# Patient Record
Sex: MALE | Race: WHITE | Employment: OTHER | URBAN - METROPOLITAN AREA
[De-identification: names, ages, dates, MRNs, and addresses within clinical notes are randomized per-mention and may not be internally consistent; named-entity substitution may affect disease eponyms.]

---

## 2019-06-26 ENCOUNTER — HOSPITAL ENCOUNTER (OUTPATIENT)
Dept: LAB | Age: 69
Discharge: HOME | End: 2019-06-26

## 2019-06-26 PROCEDURE — 88305 TISSUE EXAM BY PATHOLOGIST: CPT

## 2019-07-04 PROBLEM — C18.2 MALIGNANT NEOPLASM OF ASCENDING COLON (HCC): Status: ACTIVE | Noted: 2019-07-04

## 2019-07-12 RX ORDER — SODIUM CHLORIDE 0.9 % (FLUSH) 0.9 %
5-40 SYRINGE (ML) INJECTION AS NEEDED
Status: CANCELLED | OUTPATIENT
Start: 2019-07-12

## 2019-07-12 RX ORDER — SODIUM CHLORIDE 0.9 % (FLUSH) 0.9 %
5-40 SYRINGE (ML) INJECTION EVERY 8 HOURS
Status: CANCELLED | OUTPATIENT
Start: 2019-07-12

## 2019-07-24 ENCOUNTER — HOSPITAL ENCOUNTER (OUTPATIENT)
Dept: SURGERY | Age: 69
Discharge: HOME | End: 2019-07-24
Payer: MEDICARE

## 2019-07-24 VITALS
SYSTOLIC BLOOD PRESSURE: 131 MMHG | WEIGHT: 200.56 LBS | OXYGEN SATURATION: 97 % | BODY MASS INDEX: 31.48 KG/M2 | DIASTOLIC BLOOD PRESSURE: 72 MMHG | TEMPERATURE: 97 F | HEIGHT: 67 IN | RESPIRATION RATE: 16 BRPM | HEART RATE: 71 BPM

## 2019-07-24 RX ORDER — LANOLIN ALCOHOL/MO/W.PET/CERES
2500 CREAM (GRAM) TOPICAL DAILY
COMMUNITY

## 2019-07-24 RX ORDER — ASCORBIC ACID 500 MG
1000 TABLET ORAL DAILY
COMMUNITY
End: 2020-07-06

## 2019-07-24 RX ORDER — BISMUTH SUBSALICYLATE 262 MG
1 TABLET,CHEWABLE ORAL DAILY
COMMUNITY
End: 2020-07-06

## 2019-07-24 RX ORDER — ACETAMINOPHEN 325 MG/1
TABLET ORAL
COMMUNITY
End: 2019-08-02

## 2019-07-24 RX ORDER — DICLOFENAC SODIUM 10 MG/G
GEL TOPICAL 4 TIMES DAILY
COMMUNITY
End: 2020-07-06

## 2019-07-24 NOTE — PERIOP NOTES
Dr. Christian Harper called to discuss posted surgery. No need for patient to be seen today per Dr. Christian Harper.

## 2019-07-24 NOTE — PERIOP NOTES
Patient confirms name and . Order to obtain consent found in EHR and  matches case posting. Type 3 surgery,  assessment complete. Labs per surgeon: cbc with diff, cmp 19, type and screen signed and held Dewey. Labs per anesthesia protocol: none  EKG: not required      Medication list  Rx bottles visualized today. Hibiclens and instructions given per hospital policy. Patient provided with and instructed on educational handouts including Guide to Surgery, Pain Management, Hand Hygiene, Blood Transfusion Education, and [facility] Brochure. Patient answered medical/surgical history questions at their best of ability. All prior to admission medications documented in [facility]. Patient instructed to continue previous medications as prescribed prior to surgery and to take the following medications the day of surgery according to anesthesia guidelines with a small sip of water: omeprazole, sertraline, tylenol if needed. Patient instructed to hold all vitamins 7 days prior to surgery and NSAIDS 5 days prior to surgery, patient verbalized understanding. Medications to be held: diclofenac gel, asa 81mg. Patient denies MI, stents, stroke, blood clots. Patient instructed on the following:  Arrive at [facility], time of arrival to be called the day before by 1700  NPO after midnight including gum, mints, and ice chips. Responsible adult must drive patient to the hospital, stay during surgery, and patient will require supervision 24 hours after anesthesia. Use hibiclens in shower the night before surgery and on the morning of surgery. Leave all valuables (money and jewelry) at home but bring insurance card and ID on       DOS. Do not wear make-up, nail polish, lotions, cologne, perfumes, powders, or oil on skin. Patient teach back successful and patient demonstrates knowledge of instruction.

## 2019-07-25 ENCOUNTER — HOSPITAL ENCOUNTER (OUTPATIENT)
Dept: LAB | Age: 69
Discharge: HOME | End: 2019-07-25
Payer: MEDICARE

## 2019-07-25 LAB
ALBUMIN SERPL-MCNC: 3.4 G/DL (ref 3.2–4.6)
ALBUMIN/GLOB SERPL: 1.1 {RATIO} (ref 1.2–3.5)
ALP SERPL-CCNC: 71 U/L (ref 50–136)
ALT SERPL-CCNC: 23 U/L (ref 12–65)
ANION GAP SERPL CALC-SCNC: 7 MMOL/L (ref 7–16)
AST SERPL-CCNC: 27 U/L (ref 15–37)
BASOPHILS # BLD: 0 K/UL (ref 0–0.2)
BASOPHILS NFR BLD: 0 % (ref 0–2)
BILIRUB SERPL-MCNC: 0.2 MG/DL (ref 0.2–1.1)
BUN SERPL-MCNC: 15 MG/DL (ref 8–23)
CALCIUM SERPL-MCNC: 8.3 MG/DL (ref 8.3–10.4)
CHLORIDE SERPL-SCNC: 108 MMOL/L (ref 98–107)
CO2 SERPL-SCNC: 28 MMOL/L (ref 21–32)
CREAT SERPL-MCNC: 0.92 MG/DL (ref 0.8–1.5)
DIFFERENTIAL METHOD BLD: ABNORMAL
EOSINOPHIL # BLD: 0.1 K/UL (ref 0–0.8)
EOSINOPHIL NFR BLD: 1 % (ref 0.5–7.8)
ERYTHROCYTE [DISTWIDTH] IN BLOOD BY AUTOMATED COUNT: 17.4 % (ref 11.9–14.6)
GLOBULIN SER CALC-MCNC: 3.1 G/DL (ref 2.3–3.5)
GLUCOSE SERPL-MCNC: 148 MG/DL (ref 65–100)
HCT VFR BLD AUTO: 28.8 % (ref 41.1–50.3)
HGB BLD-MCNC: 8.1 G/DL (ref 13.6–17.2)
IMM GRANULOCYTES # BLD AUTO: 0 K/UL (ref 0–0.5)
IMM GRANULOCYTES NFR BLD AUTO: 0 % (ref 0–5)
LYMPHOCYTES # BLD: 1.2 K/UL (ref 0.5–4.6)
LYMPHOCYTES NFR BLD: 22 % (ref 13–44)
MCH RBC QN AUTO: 19.4 PG (ref 26.1–32.9)
MCHC RBC AUTO-ENTMCNC: 28.1 G/DL (ref 31.4–35)
MCV RBC AUTO: 68.9 FL (ref 79.6–97.8)
MONOCYTES # BLD: 0.4 K/UL (ref 0.1–1.3)
MONOCYTES NFR BLD: 8 % (ref 4–12)
NEUTS SEG # BLD: 3.6 K/UL (ref 1.7–8.2)
NEUTS SEG NFR BLD: 68 % (ref 43–78)
NRBC # BLD: 0 K/UL (ref 0–0.2)
PLATELET # BLD AUTO: 175 K/UL (ref 150–450)
PMV BLD AUTO: 9.4 FL (ref 9.4–12.3)
POTASSIUM SERPL-SCNC: 4.2 MMOL/L (ref 3.5–5.1)
PROT SERPL-MCNC: 6.5 G/DL (ref 6.3–8.2)
RBC # BLD AUTO: 4.18 M/UL (ref 4.23–5.6)
SODIUM SERPL-SCNC: 143 MMOL/L (ref 136–145)
WBC # BLD AUTO: 5.3 K/UL (ref 4.3–11.1)

## 2019-07-25 PROCEDURE — 36415 COLL VENOUS BLD VENIPUNCTURE: CPT

## 2019-07-25 PROCEDURE — 80053 COMPREHEN METABOLIC PANEL: CPT

## 2019-07-25 PROCEDURE — 85025 COMPLETE CBC W/AUTO DIFF WBC: CPT

## 2019-07-25 NOTE — PERIOP NOTES
Recent Results (from the past 12 hour(s))   CBC WITH AUTOMATED DIFF    Collection Time: 07/25/19  8:51 AM   Result Value Ref Range    WBC 5.3 4.3 - 11.1 K/uL    RBC 4.18 (L) 4.23 - 5.6 M/uL    HGB 8.1 (L) 13.6 - 17.2 g/dL    HCT 28.8 (L) 41.1 - 50.3 %    MCV 68.9 (L) 79.6 - 97.8 FL    MCH 19.4 (L) 26.1 - 32.9 PG    MCHC 28.1 (L) 31.4 - 35.0 g/dL    RDW 17.4 (H) 11.9 - 14.6 %    PLATELET 201 581 - 106 K/uL    MPV 9.4 9.4 - 12.3 FL    ABSOLUTE NRBC 0.00 0.0 - 0.2 K/uL    DF AUTOMATED      NEUTROPHILS 68 43 - 78 %    LYMPHOCYTES 22 13 - 44 %    MONOCYTES 8 4.0 - 12.0 %    EOSINOPHILS 1 0.5 - 7.8 %    BASOPHILS 0 0.0 - 2.0 %    IMMATURE GRANULOCYTES 0 0.0 - 5.0 %    ABS. NEUTROPHILS 3.6 1.7 - 8.2 K/UL    ABS. LYMPHOCYTES 1.2 0.5 - 4.6 K/UL    ABS. MONOCYTES 0.4 0.1 - 1.3 K/UL    ABS. EOSINOPHILS 0.1 0.0 - 0.8 K/UL    ABS. BASOPHILS 0.0 0.0 - 0.2 K/UL    ABS. IMM. GRANS. 0.0 0.0 - 0.5 K/UL   METABOLIC PANEL, COMPREHENSIVE    Collection Time: 07/25/19  8:51 AM   Result Value Ref Range    Sodium 143 136 - 145 mmol/L    Potassium 4.2 3.5 - 5.1 mmol/L    Chloride 108 (H) 98 - 107 mmol/L    CO2 28 21 - 32 mmol/L    Anion gap 7 7 - 16 mmol/L    Glucose 148 (H) 65 - 100 mg/dL    BUN 15 8 - 23 MG/DL    Creatinine 0.92 0.8 - 1.5 MG/DL    GFR est AA >60 >60 ml/min/1.73m2    GFR est non-AA >60 >60 ml/min/1.73m2    Calcium 8.3 8.3 - 10.4 MG/DL    Bilirubin, total 0.2 0.2 - 1.1 MG/DL    ALT (SGPT) 23 12 - 65 U/L    AST (SGOT) 27 15 - 37 U/L    Alk. phosphatase 71 50 - 136 U/L    Protein, total 6.5 6.3 - 8.2 g/dL    Albumin 3.4 3.2 - 4.6 g/dL    Globulin 3.1 2.3 - 3.5 g/dL    A-G Ratio 1.1 (L) 1.2 - 3.5     Reviewd  Dr Julita Pritchard notified of Hgb  Segundo at  [facility] office notified.

## 2019-07-30 ENCOUNTER — ANESTHESIA EVENT (OUTPATIENT)
Dept: SURGERY | Age: 69
DRG: 331 | End: 2019-07-30
Payer: MEDICARE

## 2019-07-31 ENCOUNTER — HOSPITAL ENCOUNTER (INPATIENT)
Age: 69
LOS: 2 days | Discharge: HOME | DRG: 331 | End: 2019-08-02
Attending: SURGERY | Admitting: SURGERY
Payer: MEDICARE

## 2019-07-31 ENCOUNTER — ANESTHESIA (OUTPATIENT)
Dept: SURGERY | Age: 69
DRG: 331 | End: 2019-07-31
Payer: MEDICARE

## 2019-07-31 DIAGNOSIS — C18.2 MALIGNANT NEOPLASM OF ASCENDING COLON (HCC): Primary | ICD-10-CM

## 2019-07-31 PROBLEM — C18.9 COLON CANCER (HCC): Status: ACTIVE | Noted: 2019-07-31

## 2019-07-31 LAB
ABO + RH BLD: NORMAL
BLOOD GROUP ANTIBODIES SERPL: NORMAL
SPECIMEN EXP DATE BLD: NORMAL

## 2019-07-31 PROCEDURE — 76010000877 HC OR TIME 2.5 TO 3HR INTENSV - TIER 2: Performed by: SURGERY

## 2019-07-31 PROCEDURE — 77030035048 HC TRCR ENDOSC OPTCL COVD -B: Performed by: SURGERY

## 2019-07-31 PROCEDURE — 77030037088 HC TUBE ENDOTRACH ORAL NSL COVD-A: Performed by: ANESTHESIOLOGY

## 2019-07-31 PROCEDURE — 76060000036 HC ANESTHESIA 2.5 TO 3 HR: Performed by: SURGERY

## 2019-07-31 PROCEDURE — 74011250636 HC RX REV CODE- 250/636: Performed by: SURGERY

## 2019-07-31 PROCEDURE — 77030039283 HC SHR HARM INSRT DISP DAVNC INTU -F: Performed by: SURGERY

## 2019-07-31 PROCEDURE — 77030031139 HC SUT VCRL2 J&J -A: Performed by: SURGERY

## 2019-07-31 PROCEDURE — 74011000250 HC RX REV CODE- 250: Performed by: SURGERY

## 2019-07-31 PROCEDURE — 77030002996 HC SUT SLK J&J -A: Performed by: SURGERY

## 2019-07-31 PROCEDURE — 76210000006 HC OR PH I REC 0.5 TO 1 HR: Performed by: SURGERY

## 2019-07-31 PROCEDURE — 74011250636 HC RX REV CODE- 250/636

## 2019-07-31 PROCEDURE — 74011250637 HC RX REV CODE- 250/637: Performed by: ANESTHESIOLOGY

## 2019-07-31 PROCEDURE — 88361 TUMOR IMMUNOHISTOCHEM/COMPUT: CPT

## 2019-07-31 PROCEDURE — 74011000254 HC RX REV CODE- 254

## 2019-07-31 PROCEDURE — 0DTF4ZZ RESECTION OF RIGHT LARGE INTESTINE, PERCUTANEOUS ENDOSCOPIC APPROACH: ICD-10-PCS | Performed by: SURGERY

## 2019-07-31 PROCEDURE — 74011000250 HC RX REV CODE- 250

## 2019-07-31 PROCEDURE — 77030009850 HC PCH ENDOSC SPEC COOK -B: Performed by: SURGERY

## 2019-07-31 PROCEDURE — 88307 TISSUE EXAM BY PATHOLOGIST: CPT

## 2019-07-31 PROCEDURE — 77030027138 HC INCENT SPIROMETER -A

## 2019-07-31 PROCEDURE — 8E0W4CZ ROBOTIC ASSISTED PROCEDURE OF TRUNK REGION, PERCUTANEOUS ENDOSCOPIC APPROACH: ICD-10-PCS | Performed by: SURGERY

## 2019-07-31 PROCEDURE — 77030019940 HC BLNKT HYPOTHRM STRY -B: Performed by: ANESTHESIOLOGY

## 2019-07-31 PROCEDURE — 77030035278 HC STPLR SEAL ENDOWR INTU -B: Performed by: SURGERY

## 2019-07-31 PROCEDURE — 77030032490 HC SLV COMPR SCD KNE COVD -B: Performed by: SURGERY

## 2019-07-31 PROCEDURE — 86900 BLOOD TYPING SEROLOGIC ABO: CPT

## 2019-07-31 PROCEDURE — 77030035489 HC REDUCR CANN ENDOWR INTU -C: Performed by: SURGERY

## 2019-07-31 PROCEDURE — 77030018842 HC SOL IRR SOD CL 9% BAXT -A: Performed by: SURGERY

## 2019-07-31 PROCEDURE — 77030008771 HC TU NG SALEM SUMP -A: Performed by: ANESTHESIOLOGY

## 2019-07-31 PROCEDURE — 77030008522 HC TBNG INSUF LAPRO STRY -B: Performed by: SURGERY

## 2019-07-31 PROCEDURE — 74011250636 HC RX REV CODE- 250/636: Performed by: ANESTHESIOLOGY

## 2019-07-31 PROCEDURE — 77030016151 HC PROTCTR LNS DFOG COVD -B: Performed by: SURGERY

## 2019-07-31 PROCEDURE — 65270000029 HC RM PRIVATE

## 2019-07-31 PROCEDURE — 77030020703 HC SEAL CANN DISP INTU -B: Performed by: SURGERY

## 2019-07-31 PROCEDURE — 77030018836 HC SOL IRR NACL ICUM -A: Performed by: SURGERY

## 2019-07-31 PROCEDURE — 77030035277 HC OBTRTR BLDELSS DISP INTU -B: Performed by: SURGERY

## 2019-07-31 PROCEDURE — 77030032523 HC RELD STPL PK ENDORS INTU -C: Performed by: SURGERY

## 2019-07-31 PROCEDURE — 77030008756 HC TU IRR SUC STRY -B: Performed by: SURGERY

## 2019-07-31 PROCEDURE — 77030039425 HC BLD LARYNG TRULITE DISP TELE -A: Performed by: ANESTHESIOLOGY

## 2019-07-31 PROCEDURE — 77030021158 HC TRCR BLN GELPRT AMR -B: Performed by: SURGERY

## 2019-07-31 PROCEDURE — 77030019908 HC STETH ESOPH SIMS -A: Performed by: ANESTHESIOLOGY

## 2019-07-31 RX ORDER — MIDAZOLAM HYDROCHLORIDE 1 MG/ML
2 INJECTION, SOLUTION INTRAMUSCULAR; INTRAVENOUS
Status: ACTIVE | OUTPATIENT
Start: 2019-07-31 | End: 2019-08-01

## 2019-07-31 RX ORDER — METRONIDAZOLE 500 MG/100ML
500 INJECTION, SOLUTION INTRAVENOUS ONCE
Status: COMPLETED | OUTPATIENT
Start: 2019-07-31 | End: 2019-07-31

## 2019-07-31 RX ORDER — NEOSTIGMINE METHYLSULFATE 1 MG/ML
INJECTION INTRAVENOUS AS NEEDED
Status: DISCONTINUED | OUTPATIENT
Start: 2019-07-31 | End: 2019-07-31 | Stop reason: HOSPADM

## 2019-07-31 RX ORDER — PANTOPRAZOLE SODIUM 40 MG/1
40 TABLET, DELAYED RELEASE ORAL
Status: DISCONTINUED | OUTPATIENT
Start: 2019-08-01 | End: 2019-08-02 | Stop reason: HOSPADM

## 2019-07-31 RX ORDER — BUPIVACAINE HYDROCHLORIDE 5 MG/ML
INJECTION, SOLUTION EPIDURAL; INTRACAUDAL AS NEEDED
Status: DISCONTINUED | OUTPATIENT
Start: 2019-07-31 | End: 2019-07-31 | Stop reason: HOSPADM

## 2019-07-31 RX ORDER — FENTANYL CITRATE 50 UG/ML
100 INJECTION, SOLUTION INTRAMUSCULAR; INTRAVENOUS ONCE
Status: ACTIVE | OUTPATIENT
Start: 2019-07-31 | End: 2019-07-31

## 2019-07-31 RX ORDER — SODIUM CHLORIDE, SODIUM LACTATE, POTASSIUM CHLORIDE, CALCIUM CHLORIDE 600; 310; 30; 20 MG/100ML; MG/100ML; MG/100ML; MG/100ML
100 INJECTION, SOLUTION INTRAVENOUS CONTINUOUS
Status: DISCONTINUED | OUTPATIENT
Start: 2019-07-31 | End: 2019-07-31 | Stop reason: HOSPADM

## 2019-07-31 RX ORDER — POTASSIUM CHLORIDE AND SODIUM CHLORIDE 450; 150 MG/100ML; MG/100ML
INJECTION, SOLUTION INTRAVENOUS CONTINUOUS
Status: DISCONTINUED | OUTPATIENT
Start: 2019-07-31 | End: 2019-08-02 | Stop reason: HOSPADM

## 2019-07-31 RX ORDER — METRONIDAZOLE 500 MG/100ML
500 INJECTION, SOLUTION INTRAVENOUS EVERY 12 HOURS
Status: COMPLETED | OUTPATIENT
Start: 2019-07-31 | End: 2019-08-01

## 2019-07-31 RX ORDER — LIDOCAINE HYDROCHLORIDE 20 MG/ML
INJECTION, SOLUTION EPIDURAL; INFILTRATION; INTRACAUDAL; PERINEURAL AS NEEDED
Status: DISCONTINUED | OUTPATIENT
Start: 2019-07-31 | End: 2019-07-31 | Stop reason: HOSPADM

## 2019-07-31 RX ORDER — ONDANSETRON 2 MG/ML
INJECTION INTRAMUSCULAR; INTRAVENOUS AS NEEDED
Status: DISCONTINUED | OUTPATIENT
Start: 2019-07-31 | End: 2019-07-31 | Stop reason: HOSPADM

## 2019-07-31 RX ORDER — OXYCODONE HYDROCHLORIDE 5 MG/1
10 TABLET ORAL
Status: DISCONTINUED | OUTPATIENT
Start: 2019-07-31 | End: 2019-07-31 | Stop reason: HOSPADM

## 2019-07-31 RX ORDER — SODIUM CHLORIDE 0.9 % (FLUSH) 0.9 %
5-40 SYRINGE (ML) INJECTION AS NEEDED
Status: DISCONTINUED | OUTPATIENT
Start: 2019-07-31 | End: 2019-08-02 | Stop reason: HOSPADM

## 2019-07-31 RX ORDER — INDOCYANINE GREEN AND WATER 25 MG
KIT INJECTION AS NEEDED
Status: DISCONTINUED | OUTPATIENT
Start: 2019-07-31 | End: 2019-07-31 | Stop reason: HOSPADM

## 2019-07-31 RX ORDER — ACETAMINOPHEN 10 MG/ML
INJECTION, SOLUTION INTRAVENOUS AS NEEDED
Status: DISCONTINUED | OUTPATIENT
Start: 2019-07-31 | End: 2019-07-31 | Stop reason: HOSPADM

## 2019-07-31 RX ORDER — PROPOFOL 10 MG/ML
INJECTION, EMULSION INTRAVENOUS AS NEEDED
Status: DISCONTINUED | OUTPATIENT
Start: 2019-07-31 | End: 2019-07-31 | Stop reason: HOSPADM

## 2019-07-31 RX ORDER — DEXAMETHASONE SODIUM PHOSPHATE 4 MG/ML
INJECTION, SOLUTION INTRA-ARTICULAR; INTRALESIONAL; INTRAMUSCULAR; INTRAVENOUS; SOFT TISSUE AS NEEDED
Status: DISCONTINUED | OUTPATIENT
Start: 2019-07-31 | End: 2019-07-31 | Stop reason: HOSPADM

## 2019-07-31 RX ORDER — HYDROMORPHONE HYDROCHLORIDE 1 MG/ML
1 INJECTION, SOLUTION INTRAMUSCULAR; INTRAVENOUS; SUBCUTANEOUS
Status: DISCONTINUED | OUTPATIENT
Start: 2019-07-31 | End: 2019-08-02 | Stop reason: HOSPADM

## 2019-07-31 RX ORDER — GLYCOPYRROLATE 0.2 MG/ML
INJECTION INTRAMUSCULAR; INTRAVENOUS AS NEEDED
Status: DISCONTINUED | OUTPATIENT
Start: 2019-07-31 | End: 2019-07-31 | Stop reason: HOSPADM

## 2019-07-31 RX ORDER — MIDAZOLAM HYDROCHLORIDE 1 MG/ML
2 INJECTION, SOLUTION INTRAMUSCULAR; INTRAVENOUS ONCE
Status: ACTIVE | OUTPATIENT
Start: 2019-07-31 | End: 2019-07-31

## 2019-07-31 RX ORDER — ONDANSETRON 2 MG/ML
4 INJECTION INTRAMUSCULAR; INTRAVENOUS ONCE
Status: DISCONTINUED | OUTPATIENT
Start: 2019-07-31 | End: 2019-07-31 | Stop reason: HOSPADM

## 2019-07-31 RX ORDER — CEFAZOLIN SODIUM/WATER 2 G/20 ML
2 SYRINGE (ML) INTRAVENOUS EVERY 8 HOURS
Status: COMPLETED | OUTPATIENT
Start: 2019-07-31 | End: 2019-08-01

## 2019-07-31 RX ORDER — SODIUM CHLORIDE, SODIUM LACTATE, POTASSIUM CHLORIDE, CALCIUM CHLORIDE 600; 310; 30; 20 MG/100ML; MG/100ML; MG/100ML; MG/100ML
100 INJECTION, SOLUTION INTRAVENOUS CONTINUOUS
Status: DISCONTINUED | OUTPATIENT
Start: 2019-07-31 | End: 2019-08-01

## 2019-07-31 RX ORDER — NALOXONE HYDROCHLORIDE 0.4 MG/ML
0.1 INJECTION, SOLUTION INTRAMUSCULAR; INTRAVENOUS; SUBCUTANEOUS AS NEEDED
Status: DISCONTINUED | OUTPATIENT
Start: 2019-07-31 | End: 2019-07-31 | Stop reason: HOSPADM

## 2019-07-31 RX ORDER — ALBUTEROL SULFATE 0.83 MG/ML
2.5 SOLUTION RESPIRATORY (INHALATION) AS NEEDED
Status: DISCONTINUED | OUTPATIENT
Start: 2019-07-31 | End: 2019-07-31 | Stop reason: HOSPADM

## 2019-07-31 RX ORDER — SODIUM CHLORIDE 0.9 % (FLUSH) 0.9 %
5-40 SYRINGE (ML) INJECTION EVERY 8 HOURS
Status: DISCONTINUED | OUTPATIENT
Start: 2019-07-31 | End: 2019-08-02 | Stop reason: HOSPADM

## 2019-07-31 RX ORDER — OXYCODONE HYDROCHLORIDE 5 MG/1
5 TABLET ORAL
Status: COMPLETED | OUTPATIENT
Start: 2019-07-31 | End: 2019-07-31

## 2019-07-31 RX ORDER — LIDOCAINE HYDROCHLORIDE 10 MG/ML
0.1 INJECTION INFILTRATION; PERINEURAL AS NEEDED
Status: DISCONTINUED | OUTPATIENT
Start: 2019-07-31 | End: 2019-08-02 | Stop reason: HOSPADM

## 2019-07-31 RX ORDER — CEFAZOLIN SODIUM/WATER 2 G/20 ML
2 SYRINGE (ML) INTRAVENOUS ONCE
Status: COMPLETED | OUTPATIENT
Start: 2019-07-31 | End: 2019-07-31

## 2019-07-31 RX ORDER — ROCURONIUM BROMIDE 10 MG/ML
INJECTION, SOLUTION INTRAVENOUS AS NEEDED
Status: DISCONTINUED | OUTPATIENT
Start: 2019-07-31 | End: 2019-07-31 | Stop reason: HOSPADM

## 2019-07-31 RX ORDER — EPHEDRINE SULFATE 50 MG/ML
INJECTION, SOLUTION INTRAVENOUS AS NEEDED
Status: DISCONTINUED | OUTPATIENT
Start: 2019-07-31 | End: 2019-07-31 | Stop reason: HOSPADM

## 2019-07-31 RX ORDER — HYDROMORPHONE HYDROCHLORIDE 2 MG/ML
0.5 INJECTION, SOLUTION INTRAMUSCULAR; INTRAVENOUS; SUBCUTANEOUS
Status: DISCONTINUED | OUTPATIENT
Start: 2019-07-31 | End: 2019-07-31 | Stop reason: HOSPADM

## 2019-07-31 RX ORDER — FENTANYL CITRATE 50 UG/ML
INJECTION, SOLUTION INTRAMUSCULAR; INTRAVENOUS AS NEEDED
Status: DISCONTINUED | OUTPATIENT
Start: 2019-07-31 | End: 2019-07-31 | Stop reason: HOSPADM

## 2019-07-31 RX ORDER — ONDANSETRON 2 MG/ML
4 INJECTION INTRAMUSCULAR; INTRAVENOUS
Status: DISCONTINUED | OUTPATIENT
Start: 2019-07-31 | End: 2019-08-02 | Stop reason: HOSPADM

## 2019-07-31 RX ORDER — DIPHENHYDRAMINE HYDROCHLORIDE 50 MG/ML
12.5 INJECTION, SOLUTION INTRAMUSCULAR; INTRAVENOUS
Status: DISCONTINUED | OUTPATIENT
Start: 2019-07-31 | End: 2019-07-31 | Stop reason: HOSPADM

## 2019-07-31 RX ORDER — OXYCODONE AND ACETAMINOPHEN 7.5; 325 MG/1; MG/1
1 TABLET ORAL
Status: DISCONTINUED | OUTPATIENT
Start: 2019-07-31 | End: 2019-08-02 | Stop reason: HOSPADM

## 2019-07-31 RX ADMIN — EPHEDRINE SULFATE 10 MG: 50 INJECTION, SOLUTION INTRAVENOUS at 16:02

## 2019-07-31 RX ADMIN — METRONIDAZOLE 500 MG: 500 INJECTION, SOLUTION INTRAVENOUS at 22:21

## 2019-07-31 RX ADMIN — NEOSTIGMINE METHYLSULFATE 4 MG: 1 INJECTION INTRAVENOUS at 16:24

## 2019-07-31 RX ADMIN — METRONIDAZOLE 500 MG: 500 INJECTION, SOLUTION INTRAVENOUS at 10:14

## 2019-07-31 RX ADMIN — Medication 2 G: at 13:59

## 2019-07-31 RX ADMIN — Medication 10 ML: at 22:21

## 2019-07-31 RX ADMIN — FENTANYL CITRATE 100 MCG: 50 INJECTION, SOLUTION INTRAMUSCULAR; INTRAVENOUS at 13:51

## 2019-07-31 RX ADMIN — PROPOFOL 150 MG: 10 INJECTION, EMULSION INTRAVENOUS at 13:51

## 2019-07-31 RX ADMIN — OXYCODONE HYDROCHLORIDE 5 MG: 5 TABLET ORAL at 17:32

## 2019-07-31 RX ADMIN — ROCURONIUM BROMIDE 50 MG: 10 INJECTION, SOLUTION INTRAVENOUS at 13:52

## 2019-07-31 RX ADMIN — ROCURONIUM BROMIDE 10 MG: 10 INJECTION, SOLUTION INTRAVENOUS at 14:53

## 2019-07-31 RX ADMIN — FENTANYL CITRATE 50 MCG: 50 INJECTION, SOLUTION INTRAMUSCULAR; INTRAVENOUS at 16:15

## 2019-07-31 RX ADMIN — DEXAMETHASONE SODIUM PHOSPHATE 8 MG: 4 INJECTION, SOLUTION INTRA-ARTICULAR; INTRALESIONAL; INTRAMUSCULAR; INTRAVENOUS; SOFT TISSUE at 13:57

## 2019-07-31 RX ADMIN — FENTANYL CITRATE 50 MCG: 50 INJECTION, SOLUTION INTRAMUSCULAR; INTRAVENOUS at 16:26

## 2019-07-31 RX ADMIN — ROCURONIUM BROMIDE 5 MG: 10 INJECTION, SOLUTION INTRAVENOUS at 16:07

## 2019-07-31 RX ADMIN — SODIUM CHLORIDE, SODIUM LACTATE, POTASSIUM CHLORIDE, AND CALCIUM CHLORIDE 100 ML/HR: 600; 310; 30; 20 INJECTION, SOLUTION INTRAVENOUS at 10:14

## 2019-07-31 RX ADMIN — INDOCYANINE GREEN AND WATER 7.5 MG: KIT at 14:41

## 2019-07-31 RX ADMIN — ONDANSETRON 4 MG: 2 INJECTION INTRAMUSCULAR; INTRAVENOUS at 13:57

## 2019-07-31 RX ADMIN — ACETAMINOPHEN 1000 MG: 10 INJECTION, SOLUTION INTRAVENOUS at 16:12

## 2019-07-31 RX ADMIN — Medication 2 G: at 22:19

## 2019-07-31 RX ADMIN — HYDROMORPHONE HYDROCHLORIDE 1 MG: 1 INJECTION, SOLUTION INTRAMUSCULAR; INTRAVENOUS; SUBCUTANEOUS at 22:22

## 2019-07-31 RX ADMIN — LIDOCAINE HYDROCHLORIDE 100 MG: 20 INJECTION, SOLUTION EPIDURAL; INFILTRATION; INTRACAUDAL; PERINEURAL at 13:51

## 2019-07-31 RX ADMIN — GLYCOPYRROLATE 0.2 MG: 0.2 INJECTION INTRAMUSCULAR; INTRAVENOUS at 14:06

## 2019-07-31 RX ADMIN — SODIUM CHLORIDE AND POTASSIUM CHLORIDE: 4.5; 1.49 INJECTION, SOLUTION INTRAVENOUS at 19:00

## 2019-07-31 RX ADMIN — GLYCOPYRROLATE 0.6 MG: 0.2 INJECTION INTRAMUSCULAR; INTRAVENOUS at 16:24

## 2019-07-31 RX ADMIN — SODIUM CHLORIDE, SODIUM LACTATE, POTASSIUM CHLORIDE, AND CALCIUM CHLORIDE: 600; 310; 30; 20 INJECTION, SOLUTION INTRAVENOUS at 15:47

## 2019-07-31 NOTE — ANESTHESIA PREPROCEDURE EVALUATION
Relevant Problems   No relevant active problems       Anesthetic History               Review of Systems / Medical History  Patient summary reviewed, nursing notes reviewed and pertinent labs reviewed    Pulmonary                   Neuro/Psych              Cardiovascular                  Exercise tolerance: >4 METS     GI/Hepatic/Renal                Endo/Other             Other Findings              Physical Exam    Airway  Mallampati: II  TM Distance: 4 - 6 cm  Neck ROM: normal range of motion   Mouth opening: Normal     Cardiovascular  Regular rate and rhythm,  S1 and S2 normal,  no murmur, click, rub, or gallop             Dental  No notable dental hx       Pulmonary  Breath sounds clear to auscultation               Abdominal         Other Findings            Anesthetic Plan    ASA: 2  Anesthesia type: general      Post-op pain plan if not by surgeon: peripheral nerve block single    Induction: Intravenous  Anesthetic plan and risks discussed with: Patient      Tap block

## 2019-07-31 NOTE — PROGRESS NOTES
TRANSFER - IN REPORT:    Verbal report received from MARIA DE JESUS Miner(name) on Governyulia Matute  being received from [facility]) for routine progression of care      Report consisted of patients Situation, Background, Assessment and   Recommendations(SBAR). Information from the following report(s) SBAR, Kardex, OR Summary, Procedure Summary, Intake/Output, MAR, Recent Results and Med Rec Status was reviewed with the receiving nurse. Opportunity for questions and clarification was provided. Assessment completed upon patients arrival to unit and care assumed.

## 2019-08-01 LAB
ANION GAP SERPL CALC-SCNC: 8 MMOL/L (ref 7–16)
BASOPHILS # BLD: 0 K/UL (ref 0–0.2)
BASOPHILS NFR BLD: 0 % (ref 0–2)
BUN SERPL-MCNC: 14 MG/DL (ref 8–23)
CALCIUM SERPL-MCNC: 7.8 MG/DL (ref 8.3–10.4)
CHLORIDE SERPL-SCNC: 104 MMOL/L (ref 98–107)
CO2 SERPL-SCNC: 27 MMOL/L (ref 21–32)
CREAT SERPL-MCNC: 0.95 MG/DL (ref 0.8–1.5)
DIFFERENTIAL METHOD BLD: ABNORMAL
EOSINOPHIL # BLD: 0 K/UL (ref 0–0.8)
EOSINOPHIL NFR BLD: 0 % (ref 0.5–7.8)
ERYTHROCYTE [DISTWIDTH] IN BLOOD BY AUTOMATED COUNT: 17.2 % (ref 11.9–14.6)
GLUCOSE SERPL-MCNC: 130 MG/DL (ref 65–100)
HCT VFR BLD AUTO: 25.8 % (ref 41.1–50.3)
HGB BLD-MCNC: 7.2 G/DL (ref 13.6–17.2)
IMM GRANULOCYTES # BLD AUTO: 0.1 K/UL (ref 0–0.5)
IMM GRANULOCYTES NFR BLD AUTO: 1 % (ref 0–5)
LYMPHOCYTES # BLD: 0.8 K/UL (ref 0.5–4.6)
LYMPHOCYTES NFR BLD: 9 % (ref 13–44)
MCH RBC QN AUTO: 19 PG (ref 26.1–32.9)
MCHC RBC AUTO-ENTMCNC: 27.9 G/DL (ref 31.4–35)
MCV RBC AUTO: 68.1 FL (ref 79.6–97.8)
MONOCYTES # BLD: 0.9 K/UL (ref 0.1–1.3)
MONOCYTES NFR BLD: 9 % (ref 4–12)
NEUTS SEG # BLD: 7.8 K/UL (ref 1.7–8.2)
NEUTS SEG NFR BLD: 82 % (ref 43–78)
NRBC # BLD: 0 K/UL (ref 0–0.2)
PLATELET # BLD AUTO: 160 K/UL (ref 150–450)
PMV BLD AUTO: 9.9 FL (ref 9.4–12.3)
POTASSIUM SERPL-SCNC: 4.2 MMOL/L (ref 3.5–5.1)
RBC # BLD AUTO: 3.79 M/UL (ref 4.23–5.6)
SODIUM SERPL-SCNC: 139 MMOL/L (ref 136–145)
WBC # BLD AUTO: 9.6 K/UL (ref 4.3–11.1)

## 2019-08-01 PROCEDURE — 80048 BASIC METABOLIC PNL TOTAL CA: CPT

## 2019-08-01 PROCEDURE — 65270000029 HC RM PRIVATE

## 2019-08-01 PROCEDURE — 74011250636 HC RX REV CODE- 250/636: Performed by: SURGERY

## 2019-08-01 PROCEDURE — 85025 COMPLETE CBC W/AUTO DIFF WBC: CPT

## 2019-08-01 PROCEDURE — 77010033678 HC OXYGEN DAILY

## 2019-08-01 PROCEDURE — 74011250636 HC RX REV CODE- 250/636: Performed by: NURSE PRACTITIONER

## 2019-08-01 PROCEDURE — 36415 COLL VENOUS BLD VENIPUNCTURE: CPT

## 2019-08-01 PROCEDURE — 94760 N-INVAS EAR/PLS OXIMETRY 1: CPT

## 2019-08-01 PROCEDURE — 74011250637 HC RX REV CODE- 250/637: Performed by: SURGERY

## 2019-08-01 RX ORDER — ENOXAPARIN SODIUM 100 MG/ML
40 INJECTION SUBCUTANEOUS EVERY 24 HOURS
Status: DISCONTINUED | OUTPATIENT
Start: 2019-08-01 | End: 2019-08-02 | Stop reason: HOSPADM

## 2019-08-01 RX ADMIN — OXYCODONE HYDROCHLORIDE AND ACETAMINOPHEN 1 TABLET: 7.5; 325 TABLET ORAL at 18:21

## 2019-08-01 RX ADMIN — Medication 10 ML: at 06:33

## 2019-08-01 RX ADMIN — SODIUM CHLORIDE AND POTASSIUM CHLORIDE: 4.5; 1.49 INJECTION, SOLUTION INTRAVENOUS at 21:43

## 2019-08-01 RX ADMIN — Medication 10 ML: at 13:25

## 2019-08-01 RX ADMIN — METRONIDAZOLE 500 MG: 500 INJECTION, SOLUTION INTRAVENOUS at 09:04

## 2019-08-01 RX ADMIN — PANTOPRAZOLE SODIUM 40 MG: 40 TABLET, DELAYED RELEASE ORAL at 06:31

## 2019-08-01 RX ADMIN — OXYCODONE HYDROCHLORIDE AND ACETAMINOPHEN 1 TABLET: 7.5; 325 TABLET ORAL at 03:55

## 2019-08-01 RX ADMIN — Medication 2 G: at 06:31

## 2019-08-01 RX ADMIN — Medication 2 G: at 13:26

## 2019-08-01 RX ADMIN — OXYCODONE HYDROCHLORIDE AND ACETAMINOPHEN 1 TABLET: 7.5; 325 TABLET ORAL at 09:02

## 2019-08-01 RX ADMIN — METRONIDAZOLE 500 MG: 500 INJECTION, SOLUTION INTRAVENOUS at 22:05

## 2019-08-01 RX ADMIN — OXYCODONE HYDROCHLORIDE AND ACETAMINOPHEN 1 TABLET: 7.5; 325 TABLET ORAL at 21:43

## 2019-08-01 RX ADMIN — OXYCODONE HYDROCHLORIDE AND ACETAMINOPHEN 1 TABLET: 7.5; 325 TABLET ORAL at 13:22

## 2019-08-01 RX ADMIN — Medication 10 ML: at 21:45

## 2019-08-01 RX ADMIN — SODIUM CHLORIDE AND POTASSIUM CHLORIDE: 4.5; 1.49 INJECTION, SOLUTION INTRAVENOUS at 13:19

## 2019-08-01 RX ADMIN — SERTRALINE HYDROCHLORIDE 150 MG: 100 TABLET ORAL at 09:03

## 2019-08-01 RX ADMIN — SODIUM CHLORIDE AND POTASSIUM CHLORIDE: 4.5; 1.49 INJECTION, SOLUTION INTRAVENOUS at 04:09

## 2019-08-01 RX ADMIN — ENOXAPARIN SODIUM 40 MG: 40 INJECTION SUBCUTANEOUS at 09:51

## 2019-08-01 NOTE — ANESTHESIA POSTPROCEDURE EVALUATION
Procedure(s):  RIGHT NORA COLECTOMY  ROBOTIC ASSISTED.     general    Anesthesia Post Evaluation      Multimodal analgesia: multimodal analgesia used between 6 hours prior to anesthesia start to PACU discharge  Patient location during evaluation: PACU  Patient participation: complete - patient participated  Level of consciousness: awake and awake and alert  Pain management: adequate  Airway patency: patent  Anesthetic complications: no  Cardiovascular status: acceptable  Respiratory status: acceptable  Hydration status: acceptable  Post anesthesia nausea and vomiting:  controlled      Vitals Value Taken Time   /70 7/31/2019  5:13 PM   Temp 36.6 °C (97.9 °F) 7/31/2019  4:45 PM   Pulse 66 7/31/2019  5:13 PM   Resp 16 7/31/2019  5:13 PM   SpO2 93 % 7/31/2019  5:13 PM

## 2019-08-01 NOTE — PROGRESS NOTES
07/31/19 6220   Dual Skin Pressure Injury Assessment   Dual Skin Pressure Injury Assessment WDL   Second Care Provider (Based on [address] [place] [address]) Francisco Winn RN     Patient has no evidence of skin breakdown at this time. Patient arrived with 4 abdomen lap sites that are clean, dry, and intact. Patient currently not c/o pain at this time. Bed is L/L, call light in reach, and wife is currently at bedside. Bedside report given to SAUER [facility] MARIA DE JESUS RUFFIN.

## 2019-08-01 NOTE — PROGRESS NOTES
PLAN:  Await return of bowel function  Await pathology results  Diet Clear liquids   IVFs  SCD/IS/Protonix/Lovenox for prophylactic measures  OOB and ambulate  Pain/nausea control  Monitor labs  DC malave    ASSESSMENT:  Admit Date: 7/31/2019   1 Day Post-Op  Procedure(s):  RIGHT NORA COLECTOMY  ROBOTIC ASSISTED    Active Problems:    Colon cancer ([address] (7/31/2019)         SUBJECTIVE:  Awake in bed, no complaints. Denies n/v. No flatus. Uneventful night. Labs stable. VSS. Intake/Output Summary (Last 24 hours) at 8/1/2019 0843  Last data filed at 8/1/2019 0342  Gross per 24 hour   Intake 1600 ml   Output 975 ml   Net 625 ml     OBJECTIVE:  Constitutional: Alert oriented cooperative patient in no acute distress; appears stated age   Visit Vitals  /68   Pulse 79   Temp 98.3 °F (36.8 °C)   Resp 16   Ht 5' 7\" (1.702 m)   Wt 200 lb 13.4 oz (91.1 kg)   SpO2 93%   BMI 31.46 kg/m²     Eyes:Sclera are clear. ENMT: no external lesions gross hearing normal; no obvious neck masses, no ear or lip lesions  CV: RRR. Normal perfusion  Resp: No JVD. Breathing is  non-labored; no audible wheezing. GI: soft and non-distended, dressing c/d/i    Musculoskeletal: unremarkable with normal function. No embolic signs or cyanosis.    Neuro:  Oriented; moves all 4; no focal deficits  Psychiatric: normal affect and mood, no memory impairment      Patient Vitals for the past 24 hrs:   BP Temp Pulse Resp SpO2 Height Weight   08/01/19 0738 -- -- -- -- 93 % -- --   08/01/19 0616 -- -- -- -- -- -- 200 lb 13.4 oz (91.1 kg)   08/01/19 0342 120/68 98.3 °F (36.8 °C) 79 16 92 % -- --   08/01/19 0111 156/56 97.6 °F (36.4 °C) 77 17 94 % -- --   07/31/19 2027 157/88 98.3 °F (36.8 °C) 93 17 93 % -- --   07/31/19 1840 166/85 98 °F (36.7 °C) 72 16 94 % -- --   07/31/19 1728 175/80 -- 63 -- 94 % -- --   07/31/19 1723 168/77 98 °F (36.7 °C) 68 16 93 % -- --   07/31/19 1718 171/75 -- 68 16 94 % -- --   07/31/19 1713 164/70 -- 66 16 93 % -- -- 07/31/19 1708 164/77 -- 80 16 93 % -- --   07/31/19 1703 178/82 -- 81 16 94 % -- --   07/31/19 1658 176/81 -- 79 16 93 % -- --   07/31/19 1653 179/78 -- 75 16 91 % -- --   07/31/19 1648 168/79 -- 77 16 93 % -- --   07/31/19 1645 174/79 97.9 °F (36.6 °C) 77 16 93 % -- --   07/31/19 1004 177/83 99 °F (37.2 °C) 95 16 95 % 5' 7\" (1.702 m) 198 lb (89.8 kg)     Labs:    Recent Labs     08/01/19  0447   WBC 9.6   HGB 7.2*         K 4.2      CO2 27   BUN 14   CREA 0.95   *       Signed:  Jose Miguel Yeboah, NP

## 2019-08-02 VITALS
DIASTOLIC BLOOD PRESSURE: 67 MMHG | HEART RATE: 77 BPM | OXYGEN SATURATION: 95 % | SYSTOLIC BLOOD PRESSURE: 122 MMHG | TEMPERATURE: 98.3 F | HEIGHT: 67 IN | WEIGHT: 202 LBS | RESPIRATION RATE: 18 BRPM | BODY MASS INDEX: 31.71 KG/M2

## 2019-08-02 PROCEDURE — 74011250636 HC RX REV CODE- 250/636: Performed by: SURGERY

## 2019-08-02 PROCEDURE — 74011250636 HC RX REV CODE- 250/636: Performed by: NURSE PRACTITIONER

## 2019-08-02 PROCEDURE — 74011250637 HC RX REV CODE- 250/637: Performed by: SURGERY

## 2019-08-02 RX ORDER — OXYCODONE AND ACETAMINOPHEN 7.5; 325 MG/1; MG/1
1 TABLET ORAL
Qty: 25 TAB | Refills: 0 | Status: SHIPPED | OUTPATIENT
Start: 2019-08-02 | End: 2019-08-07

## 2019-08-02 RX ORDER — ONDANSETRON HYDROCHLORIDE 8 MG/1
8 TABLET, FILM COATED ORAL
Qty: 10 TAB | Refills: 0 | Status: SHIPPED | OUTPATIENT
Start: 2019-08-02 | End: 2020-07-06

## 2019-08-02 RX ADMIN — PANTOPRAZOLE SODIUM 40 MG: 40 TABLET, DELAYED RELEASE ORAL at 06:07

## 2019-08-02 RX ADMIN — SODIUM CHLORIDE AND POTASSIUM CHLORIDE: 4.5; 1.49 INJECTION, SOLUTION INTRAVENOUS at 08:00

## 2019-08-02 RX ADMIN — OXYCODONE HYDROCHLORIDE AND ACETAMINOPHEN 1 TABLET: 7.5; 325 TABLET ORAL at 07:43

## 2019-08-02 RX ADMIN — Medication 10 ML: at 06:08

## 2019-08-02 RX ADMIN — ENOXAPARIN SODIUM 40 MG: 40 INJECTION SUBCUTANEOUS at 09:11

## 2019-08-02 RX ADMIN — SERTRALINE HYDROCHLORIDE 150 MG: 100 TABLET ORAL at 09:11

## 2019-08-02 NOTE — DISCHARGE SUMMARY
[facility]  [place], [address] [facility]  [phone]   Discharge Summary     Basia Mehta  MRN: [id number]     : 1950     Age: 78 y.o. Admit date: 2019     Discharge date: 2019  Attending Physician: Dr. Cheryle Kocher  Primary Discharge Diagnosis:   Active Problems:    Colon cancer [facility]) (2019)      Primary Operations or Procedures Performed :  Procedure(s):  RIGHT NORA COLECTOMY  ROBOTIC ASSISTED     Brief History and Reason for Admission: Basia Mehta was admitted with the following history of present illness. Mr. Elisabeth Briones is an 78 y.o. male who was referred for evaluation and treatment of a mass located in the ascending colon. Mass was identified by colonoscopy. Current symptoms include Anemia - 285.9. The mass was biopsied. Pathology is positive for adenocarcinoma. The lesion was tattooed. Colonoscopy Colonoscopy was complete to cecum. [facility] Course:  On , Dr. Cheryle Kocher performed robotic right hemicolectomy. Pt tolerated the procedure well and was transferred to the floor in stable condition. On POD#2, he had return of bowel function, was tolerating diet, was voiding without difficulty, ambulating, and his pain was controlled. He was prepared for discharge home in stable condition. Condition at Discharge: Good    Discharge Medications:   Current Discharge Medication List      START taking these medications    Details   oxyCODONE-acetaminophen (PERCOCET 7.5) 7.5-325 mg per tablet Take 1 Tab by mouth every four (4) hours as needed for Pain for up to 5 days. Max Daily Amount: 6 Tabs. Qty: 25 Tab, Refills: 0    Associated Diagnoses: Malignant neoplasm of ascending colon (HCC)      ondansetron hcl (ZOFRAN) 8 mg tablet Take 1 Tab by mouth every eight (8) hours as needed for Nausea.  Indications: Prevent Nausea and Vomiting After Surgery  Qty: 10 Tab, Refills: 0         CONTINUE these medications which have NOT CHANGED    Details   diclofenac (VOLTAREN) 1 % gel Apply  to affected area four (4) times daily. cyanocobalamin 1,000 mcg tablet Take 1,000 mcg by mouth daily. ascorbic acid, vitamin C, (VITAMIN C) 500 mg tablet Take 1,000 mg by mouth daily. multivitamin (ONE A DAY) tablet Take 1 Tab by mouth daily. omeprazole (PRILOSEC) 20 mg capsule Take 40 mg by mouth. sertraline (ZOLOFT) 100 mg tablet 150 mg. Refills: 0      aspirin delayed-release 81 mg tablet Take 1 Tab by mouth. STOP taking these medications       acetaminophen (TYLENOL) 325 mg tablet Comments:   Reason for Stopping:                 Disposition: good    Discharge Instructions/Follow-up Care:        Discharge Instructions/Follow-up Plans:   MD Instructions:     Follow-up with Dr. Hiral Quintana in 1 week. Keep incisions clean and dry, may remain uncovered. Do not apply lotions, creams or ointments to incisions.     Diet - as tolerated - Soft foods diet  Activity - ambulate - as tolerated - no heavy lifting >10lb. May shower - no tub baths or soaking/submerging.     No driving while taking narcotics. Do not drink alcohol while taking narcotics.   Resume other home medications.      If problems or questions arise, please call our office at [phone].     Greater than 30 minutes were spent discharging the patient       Signed:  Gi Brunson   8/2/2019  12:17 PM

## 2019-08-02 NOTE — DISCHARGE INSTRUCTIONS
Discharge Instructions/Follow-up Plans:   MD Instructions:     Follow-up with Dr. Kash Clarke in 1 week. Keep incisions clean and dry, may remain uncovered. Do not apply lotions, creams or ointments to incisions.     Diet - as tolerated - Soft foods diet  Activity - ambulate - as tolerated - no heavy lifting >10lb. May shower - no tub baths or soaking/submerging.     No driving while taking narcotics. Do not drink alcohol while taking narcotics. Resume other home medications.      If problems or questions arise, please call our office at [phone].       Patient Education        Open Bowel Resection: What to Expect at [address] [place] [address] are likely to have pain that comes and goes for the next few days after bowel surgery. You may have bowel cramps, and your cut (incision) may hurt. You may also feel like you have the flu. You may have a low fever and feel tired and nauseated. This is common. You should feel better after a week and will probably be back to normal in 2 to 3 weeks. This care sheet gives you a general idea about how long it will take for you to recover. But each person recovers at a different pace. Follow the steps below to get better as quickly as possible. How can you care for yourself at home? Activity    · Rest when you feel tired. Getting enough sleep will help you recover.     · Try to walk each day. Start by walking a little more than you did the day before. Bit by bit, increase the amount you walk. Walking boosts blood flow and helps prevent pneumonia and constipation.     · Avoid strenuous activities, such as biking, jogging, weight lifting, or aerobic exercise, until your doctor says it is okay.     · Ask your doctor when you can drive again.     · You will probably need to take 3 to 4 weeks off from work. It depends on the type of work you do and how you feel.  You may need to take off 4 to 6 weeks if you lift heavy objects in your job.     · You may shower 24 to 48 hours after surgery, if your doctor says it is okay. Pat the cut (incision) dry. Do not take a bath for the first 2 weeks, or until your doctor tells you it is okay.     · Ask your doctor when it is okay for you to have sex. Diet    · You may not have much appetite after the surgery. But try to eat a healthy diet. Your doctor will tell you about any foods you should not eat.     · Eat a low-fiber diet for several weeks after surgery. Eat many small meals throughout the day. Add high-fiber foods a little at a time.     · Eat yogurt. It puts good bacteria into your colon and helps prevent diarrhea.     · Try to avoid nuts, seeds, and corn for a while. They may be hard to digest.     · You may need to take vitamins that contain sodium and potassium. Ask your doctor.     · Drink plenty of fluids to avoid becoming dehydrated. Medicines    · Your doctor will tell you if and when you can restart your medicines. He or she will also give you instructions about taking any new medicines.     · If you take blood thinners, such as warfarin (Coumadin), clopidogrel (Plavix), or aspirin, be sure to talk to your doctor. He or she will tell you if and when to start taking those medicines again. Make sure that you understand exactly what your doctor wants you to do.     · Take pain medicines exactly as directed. ? If the doctor gave you a prescription medicine for pain, take it as prescribed. ? If you are not taking a prescription pain medicine, ask your doctor if you can take an over-the-counter medicine. ? Do not take two or more pain medicines at the same time unless the doctor told you to. Many pain medicines have acetaminophen, which is Tylenol. Too much acetaminophen (Tylenol) can be harmful.     · If you think your pain medicine is making you sick to your stomach:  ? Take your medicine after meals (unless your doctor tells you not to). ?  Ask your doctor for a different pain medicine.     · If your doctor prescribed antibiotics, take them as directed. Do not stop taking them just because you feel better. You need to take the full course of antibiotics.     · You may need to take some medicines in a different form. You will be told whether to crush pills or take a liquid form of the medicine.     · If your doctor gives you a stool softener, take it as directed. Incision care    · If you have strips of tape on the incision, leave the tape on for a week or until it falls off.     · Wash the area daily with warm, soapy water, and pat it dry. Follow-up care is a key part of your treatment and safety. Be sure to make and go to all appointments, and call your doctor if you are having problems. It's also a good idea to know your test results and keep a list of the medicines you take. When should you call for help? Call 911 anytime you think you may need emergency care. For example, call if:    · You passed out (lost consciousness).     · You are short of breath.    Call your doctor now or seek immediate medical care if:    · You are sick to your stomach and cannot drink fluids or keep them down.     · You have signs of a blood clot in your leg (called a deep vein thrombosis), such as:  ? Pain in your calf, back of the knee, thigh, or groin. ? Redness and swelling in your leg or groin.     · You have signs of infection, such as:  ? Increased pain, swelling, warmth, or redness. ? Red streaks leading from the incision. ? Pus draining from the incision. ? A fever.     · You have pain that does not get better after you take pain medicine.     · You have loose stitches, or your incision comes open.     · Bright red blood has soaked through the bandage.     · You cannot pass stools or gas.    Watch closely for any changes in your health, and be sure to contact your doctor if you have any problems. Where can you learn more? Go to http://aries-samir.info/.   Enter L763 in the search box to learn more about \"Open Bowel Resection: What to Expect at Home. \"  Current as of: November 7, 2018  Content Version: 12.1  © 1684-9792 Healthwise, [employer]. Care instructions adapted under license by [employer] (which disclaims liability or warranty for this information). If you have questions about a medical condition or this instruction, always ask your healthcare professional. [address] any warranty or liability for your use of this information. DISCHARGE SUMMARY from Nurse    PATIENT INSTRUCTIONS:    After general anesthesia or intravenous sedation, for 24 hours or while taking prescription Narcotics:  · Limit your activities  · Do not drive and operate hazardous machinery  · Do not make important personal or business decisions  · Do  not drink alcoholic beverages  · If you have not urinated within 8 hours after discharge, please contact your surgeon on call. Report the following to your surgeon:  · Excessive pain, swelling, redness or odor of or around the surgical area  · Temperature over 100.5  · Nausea and vomiting lasting longer than 4 hours or if unable to take medications  · Any signs of decreased circulation or nerve impairment to extremity: change in color, persistent  numbness, tingling, coldness or increase pain  · Any questions    What to do at Home:  Recommended activity: Activity as tolerated. If you experience any of the following symptoms:  Fever greater than 100.5/ chills,  Uncontrolled pain or nausea/ vomiting,  Redness/ warmth or drainage with a tan color/ foul odor at incision sites,  please follow up with your doctor. *  Please give a list of your current medications to your Primary Care Provider. *  Please update this list whenever your medications are discontinued, doses are      changed, or new medications (including over-the-counter products) are added. *  Please carry medication information at all times in case of emergency situations.     These are general instructions for a healthy lifestyle:    No smoking/ No tobacco products/ Avoid exposure to second hand smoke  Surgeon General's Warning:  Quitting smoking now greatly reduces serious risk to your health. Obesity, smoking, and sedentary lifestyle greatly increases your risk for illness    A healthy diet, regular physical exercise & weight monitoring are important for maintaining a healthy lifestyle    You may be retaining fluid if you have a history of heart failure or if you experience any of the following symptoms:  Weight gain of 3 pounds or more overnight or 5 pounds in a week, increased swelling in our hands or feet or shortness of breath while lying flat in bed. Please call your doctor as soon as you notice any of these symptoms; do not wait until your next office visit. The discharge information has been reviewed with the patient. The patient verbalized understanding. Discharge medications reviewed with the patient and appropriate educational materials and side effects teaching were provided.   ___________________________________________________________________________________________________________________________________

## 2019-08-02 NOTE — PROGRESS NOTES
PLAN:  Advance diet  Await pathology results  D/C IVFs  SCD/IS/Protonix/Lovenox for prophylactic measures  OOB and ambulate  Pain/nausea control  D/c Home today    ASSESSMENT:  Admit Date: 7/31/2019   2 Day Post-Op  Procedure(s):  RIGHT NORA COLECTOMY  ROBOTIC ASSISTED    Active Problems:    Colon cancer ([facility] (7/31/2019)         SUBJECTIVE:  Awake in bed, no complaints. Denies n/v. + flatus and BM. Pain controlled. AF, VSS, on 1.5L NC.  2.3L UOP/24h. Path pending. Intake/Output Summary (Last 24 hours) at 8/2/2019 1212  Last data filed at 8/2/2019 1124  Gross per 24 hour   Intake 4356 ml   Output 1740 ml   Net 2616 ml     OBJECTIVE:  Constitutional: Alert oriented cooperative patient in no acute distress; appears stated age   Visit Vitals  /71   Pulse 75   Temp 99.1 °F (37.3 °C)   Resp 18   Ht 5' 7\" (1.702 m)   Wt 202 lb (91.6 kg)   SpO2 92%   BMI 31.64 kg/m²     Eyes:Sclera are clear. ENMT: no external lesions gross hearing normal; no obvious neck masses, no ear or lip lesions  CV: RRR. Normal perfusion  Resp: No JVD. Breathing is  non-labored; no audible wheezing. GI: soft and non-distended, dressing c/d/i; + BS. Minimal tenderness. Musculoskeletal: unremarkable with normal function. No embolic signs or cyanosis.    Neuro:  Oriented; moves all 4; no focal deficits  Psychiatric: normal affect and mood, no memory impairment      Patient Vitals for the past 24 hrs:   BP Temp Pulse Resp SpO2 Weight   08/02/19 0735 127/71 99.1 °F (37.3 °C) 75 18 92 % --   08/02/19 0310 127/75 99.1 °F (37.3 °C) 79 16 92 % 202 lb (91.6 kg)   08/01/19 2335 137/73 99.6 °F (37.6 °C) 76 16 92 % --   08/01/19 1922 132/73 99.7 °F (37.6 °C) 84 16 92 % --   08/01/19 1529 123/70 98.4 °F (36.9 °C) 84 16 90 % --     Labs:    Recent Labs     08/01/19  0447   WBC 9.6   HGB 7.2*         K 4.2      CO2 27   BUN 14   CREA 0.95   *       Signed:  KEITH Elias

## 2019-08-02 NOTE — PROGRESS NOTES
Initial visit to assess pt's spiritual needs. Feeling today? Good  Pt discharging to home    Receiving good care?  yes    Needs from Spiritual Care:  None now    Ministry of presence & prayer to demonstrate caring & concern, convey emotional & spiritual support.     jared Nelson MDiv,[facility],PhD

## 2019-08-03 NOTE — OP NOTES
[facility]  OPERATIVE REPORT    Name:  Maida Bundy  MR#:  [id number]  :  1950  ACCOUNT #:  [de-identified]  DATE OF SERVICE:  2019    PREOPERATIVE DIAGNOSIS:  Colon cancer. POSTOPERATIVE DIAGNOSIS:  Colon cancer. PROCEDURE PERFORMED:  Robotic-assisted right hemicolectomy with ileocolic anastomosis. SURGEON:  Moira Faith MD    ASSISTANT:  Ira Padilla, certified first assist.    ANESTHESIA:  General.    COMPLICATIONS:  None. SPECIMENS REMOVED:  Right colon. IMPLANTS:  None. ESTIMATED BLOOD LOSS:  25 mL. DRAINS:  None. CONDITION ON COMPLETION:  Stable. INDICATION:  This patient is a 78-year-old white male who was found to have a near obstructing lesion in his colon, felt to be in the ascending portion. This was discovered by endoscopy and a tattoo was placed. CT scan showed no evidence of metastatic disease in the liver or peritoneum. Risks, benefits, and alternatives to surgical options were discussed clearly with the patient and family. They understood the risks and wished to proceed with the above procedure and consent was given. PROCEDURE IN DETAIL:  The patient was taken to the operating room where he underwent general endotracheal anesthetic with no difficulty. The area was prepped and draped in sterile fashion. Time-out was performed identifying the patient and procedure. IV antibiotics were administered at the time of anesthetic. Entry into the peritoneal cavity was gained through a 1-cm incision lateral to the umbilicus on the left side and Optiview trocar technique was used to enter the peritoneum. The abdomen was insufflated and the camera was introduced. Tattooed lesion was seen just distal to the hepatic flexure. Working ports were placed in the lower midline and left upper quadrant under direct vision. A 5 mm assistant port was placed in the left lateral abdomen.   Parchment robotic system was brought to the table and docked to the ports in the usual fashion. Instruments were inserted under direct vision. Electrocautery hook was used to gain mobilization at the cecum. This was continued along the white line of Toldt to the hepatic flexure. The hepatic flexure was carefully taken down in a stepwise fashion using a combination of electrocautery hook and harmonic scalpel device. Once the colon was completely mobilized to the middle colic from lateral to medial upon its mesentery, the linear stapling device was used to divide the terminal ileum just proximal to the ileocecal valve and the colon just distal to the tattoo giving at least 10 cm gross margin to the palpable lesion. Mesentery between these two points were carefully taken down in a stepwise fashion with Harmonic scalpel device with good hemostasis. Once completed, the specimen was placed in the right upper quadrant and a side-to-side ileocolic anastomosis was performed. 3-0 silk stay sutures were used at the antimesenteric border, both segments to align them properly Enterotomy was created at each staple line. One limb of the linear 45-mm stapler was inserted into each lumen. The stapler closed and fired creating a side-to-side stapled anastomosis. The anastomosis was inspected and hemostasis was assured. The common enterotomy was then closed using a running full-thickness 2-0 silk suture. This was further imbricated with second layer of interrupted 2-0 silk suture. Suction  was used to irrigate all dissection planes and the anastomosis. Inspection was performed. Hemostasis was complete. All fluid suctioned out of the abdomen. A Breadcrumbtracking EndoCatch bag was placed into the abdomen through the trocar and specimen placed within it. The specimen was brought out through the lower midline port site which was extended slightly in transverse fashion to allow removal of the specimen. This was inspected on the back table.   The lesion was present as described and adequate linear longitudinal margins were present. Specimen sent to pathology for review. The port sites were closed using fascial sutures of 0 interrupted Vicryl. Irrigation of the port sites was performed and the skin was closed with skin staples. Sterile gauze dressings were placed. The patient was awakened, extubated and taken to recovery in stable condition at completion of the procedure. He tolerated the procedure well with no intraoperative complications.         Monalisa Olszewski, MD      [id number]  D:  08/02/2019 13:49  T:  08/02/2019 13:58  JOB #:  [id number]

## 2020-07-06 ENCOUNTER — HOME HEALTH ADMISSION (OUTPATIENT)
Dept: HOME HEALTH SERVICES | Facility: HOME HEALTH | Age: 70
End: 2020-07-06
Payer: MEDICARE

## 2020-07-06 ENCOUNTER — HOSPITAL ENCOUNTER (OUTPATIENT)
Dept: PHYSICAL THERAPY | Age: 70
Discharge: HOME | End: 2020-07-06
Payer: MEDICARE

## 2020-07-06 ENCOUNTER — HOSPITAL ENCOUNTER (OUTPATIENT)
Dept: SURGERY | Age: 70
Discharge: HOME | End: 2020-07-06
Payer: MEDICARE

## 2020-07-06 VITALS
RESPIRATION RATE: 18 BRPM | HEIGHT: 67 IN | BODY MASS INDEX: 31.08 KG/M2 | OXYGEN SATURATION: 93 % | HEART RATE: 67 BPM | DIASTOLIC BLOOD PRESSURE: 78 MMHG | SYSTOLIC BLOOD PRESSURE: 152 MMHG | TEMPERATURE: 98 F | WEIGHT: 198 LBS

## 2020-07-06 LAB
ALBUMIN SERPL-MCNC: 3.5 G/DL (ref 3.2–4.6)
ANION GAP SERPL CALC-SCNC: 5 MMOL/L (ref 7–16)
APTT PPP: 28.2 SEC (ref 24.3–35.4)
ATRIAL RATE: 77 BPM
BACTERIA SPEC CULT: ABNORMAL
BASOPHILS # BLD: 0 K/UL (ref 0–0.2)
BASOPHILS NFR BLD: 0 % (ref 0–2)
BUN SERPL-MCNC: 14 MG/DL (ref 8–23)
CALCIUM SERPL-MCNC: 9.4 MG/DL (ref 8.3–10.4)
CALCULATED P AXIS, ECG09: 44 DEGREES
CALCULATED R AXIS, ECG10: 0 DEGREES
CALCULATED T AXIS, ECG11: 0 DEGREES
CHLORIDE SERPL-SCNC: 106 MMOL/L (ref 98–107)
CO2 SERPL-SCNC: 30 MMOL/L (ref 21–32)
CREAT SERPL-MCNC: 0.91 MG/DL (ref 0.8–1.5)
DIAGNOSIS, 93000: NORMAL
DIFFERENTIAL METHOD BLD: ABNORMAL
EOSINOPHIL # BLD: 0.1 K/UL (ref 0–0.8)
EOSINOPHIL NFR BLD: 1 % (ref 0.5–7.8)
ERYTHROCYTE [DISTWIDTH] IN BLOOD BY AUTOMATED COUNT: 15.6 % (ref 11.9–14.6)
EST. AVERAGE GLUCOSE BLD GHB EST-MCNC: 148 MG/DL
GLUCOSE SERPL-MCNC: 120 MG/DL (ref 65–100)
HBA1C MFR BLD: 6.8 %
HCT VFR BLD AUTO: 40.7 % (ref 41.1–50.3)
HGB BLD-MCNC: 13.2 G/DL (ref 13.6–17.2)
IMM GRANULOCYTES # BLD AUTO: 0 K/UL (ref 0–0.5)
IMM GRANULOCYTES NFR BLD AUTO: 0 % (ref 0–5)
INR PPP: 1
LYMPHOCYTES # BLD: 1.5 K/UL (ref 0.5–4.6)
LYMPHOCYTES NFR BLD: 25 % (ref 13–44)
MCH RBC QN AUTO: 26.7 PG (ref 26.1–32.9)
MCHC RBC AUTO-ENTMCNC: 32.4 G/DL (ref 31.4–35)
MCV RBC AUTO: 82.4 FL (ref 79.6–97.8)
MONOCYTES # BLD: 0.4 K/UL (ref 0.1–1.3)
MONOCYTES NFR BLD: 7 % (ref 4–12)
NEUTS SEG # BLD: 3.9 K/UL (ref 1.7–8.2)
NEUTS SEG NFR BLD: 66 % (ref 43–78)
NRBC # BLD: 0 K/UL (ref 0–0.2)
P-R INTERVAL, ECG05: 160 MS
PLATELET # BLD AUTO: 194 K/UL (ref 150–450)
PMV BLD AUTO: 8.6 FL (ref 9.4–12.3)
POTASSIUM SERPL-SCNC: 3.8 MMOL/L (ref 3.5–5.1)
PROTHROMBIN TIME: 13.1 SEC (ref 12–14.7)
Q-T INTERVAL, ECG07: 398 MS
QRS DURATION, ECG06: 90 MS
QTC CALCULATION (BEZET), ECG08: 450 MS
RBC # BLD AUTO: 4.94 M/UL (ref 4.23–5.6)
SERVICE CMNT-IMP: ABNORMAL
SODIUM SERPL-SCNC: 141 MMOL/L (ref 136–145)
VENTRICULAR RATE, ECG03: 77 BPM
WBC # BLD AUTO: 5.9 K/UL (ref 4.3–11.1)

## 2020-07-06 PROCEDURE — 85025 COMPLETE CBC W/AUTO DIFF WBC: CPT

## 2020-07-06 PROCEDURE — 85610 PROTHROMBIN TIME: CPT

## 2020-07-06 PROCEDURE — 80048 BASIC METABOLIC PNL TOTAL CA: CPT

## 2020-07-06 PROCEDURE — 80307 DRUG TEST PRSMV CHEM ANLYZR: CPT

## 2020-07-06 PROCEDURE — 85730 THROMBOPLASTIN TIME PARTIAL: CPT

## 2020-07-06 PROCEDURE — 77030027138 HC INCENT SPIROMETER -A

## 2020-07-06 PROCEDURE — 97161 PT EVAL LOW COMPLEX 20 MIN: CPT

## 2020-07-06 PROCEDURE — 36415 COLL VENOUS BLD VENIPUNCTURE: CPT

## 2020-07-06 PROCEDURE — 87641 MR-STAPH DNA AMP PROBE: CPT

## 2020-07-06 PROCEDURE — 83036 HEMOGLOBIN GLYCOSYLATED A1C: CPT

## 2020-07-06 PROCEDURE — 82040 ASSAY OF SERUM ALBUMIN: CPT

## 2020-07-06 RX ORDER — BISMUTH SUBSALICYLATE 262 MG
1 TABLET,CHEWABLE ORAL DAILY
COMMUNITY

## 2020-07-06 RX ORDER — CELECOXIB 200 MG/1
CAPSULE ORAL 2 TIMES DAILY
COMMUNITY
End: 2020-07-30

## 2020-07-06 RX ORDER — TRAMADOL HYDROCHLORIDE 50 MG/1
50 TABLET ORAL
COMMUNITY
End: 2020-07-30

## 2020-07-06 RX ORDER — ASPIRIN 81 MG/1
TABLET ORAL DAILY
COMMUNITY
End: 2020-07-30

## 2020-07-06 NOTE — PERIOP NOTES
PLEASE CONTINUE TAKING ALL PRESCRIPTION MEDICATIONS UP TO THE DAY OF SURGERY UNLESS OTHERWISE DIRECTED BELOW. DISCONTINUE all vitamins, herbals and supplements 21 days prior to surgery. DISCONTINUE Non-Steriodal Anti-Inflammatory (NSAIDS) such as Advil, Ibuprofen, and Aleve 5 days prior to surgery. Home Medications to HOLD      All vitamins, supplements, and herbals stop 21 days prior to surgery   All NSAIDs such as Advil, Aleve, Ibuprofen, Diclofenac, Naproxen, etc. Stop 5 days prior to surgery. Hold Aspirin 5 days prior to surgery. Hold Tylenol (Acetaminophen) 24 hours prior to surgery. Home Medications to take  the day of surgery   Omeprazole, Tramadol if needed        Comments   Please bring Omeprazole, bottle of soap (Dynahex, and incentive spirometer to the hospital on the day of surgery. Please do not bring home medications with you on the day of surgery unless otherwise directed by your nurse. If you are instructed to bring home medications, please give them to your nurse as they will be administered by the nursing staff. If you have any questions, please call [place] [phone] or [facility] [phone]. Copy of above instructions given to patient.

## 2020-07-06 NOTE — PROGRESS NOTES
[facility] Case Management note:  Patient screened in Prehab for discharge planning needs. Patient scheduled for a future total joint replacement. We discussed Home Health and equipment needed after surgery. List of Home Health providers offered. Patient w/o preference towards provider. Initial referral sent to [facility].   Has appropriate DME

## 2020-07-06 NOTE — PERIOP NOTES
Recent Results (from the past 12 hour(s))   CBC WITH AUTOMATED DIFF    Collection Time: 07/06/20 10:38 AM   Result Value Ref Range    WBC 5.9 4.3 - 11.1 K/uL    RBC 4.94 4.23 - 5.6 M/uL    HGB 13.2 (L) 13.6 - 17.2 g/dL    HCT 40.7 (L) 41.1 - 50.3 %    MCV 82.4 79.6 - 97.8 FL    MCH 26.7 26.1 - 32.9 PG    MCHC 32.4 31.4 - 35.0 g/dL    RDW 15.6 (H) 11.9 - 14.6 %    PLATELET 194 370 - 191 K/uL    MPV 8.6 (L) 9.4 - 12.3 FL    ABSOLUTE NRBC 0.00 0.0 - 0.2 K/uL    DF AUTOMATED      NEUTROPHILS 66 43 - 78 %    LYMPHOCYTES 25 13 - 44 %    MONOCYTES 7 4.0 - 12.0 %    EOSINOPHILS 1 0.5 - 7.8 %    BASOPHILS 0 0.0 - 2.0 %    IMMATURE GRANULOCYTES 0 0.0 - 5.0 %    ABS. NEUTROPHILS 3.9 1.7 - 8.2 K/UL    ABS. LYMPHOCYTES 1.5 0.5 - 4.6 K/UL    ABS. MONOCYTES 0.4 0.1 - 1.3 K/UL    ABS. EOSINOPHILS 0.1 0.0 - 0.8 K/UL    ABS. BASOPHILS 0.0 0.0 - 0.2 K/UL    ABS. IMM.  GRANS. 0.0 0.0 - 0.5 K/UL   HEMOGLOBIN A1C WITH EAG    Collection Time: 07/06/20 10:38 AM   Result Value Ref Range    Hemoglobin A1c 6.8 %    Est. average glucose 089 mg/dL   METABOLIC PANEL, BASIC    Collection Time: 07/06/20 10:38 AM   Result Value Ref Range    Sodium 141 136 - 145 mmol/L    Potassium 3.8 3.5 - 5.1 mmol/L    Chloride 106 98 - 107 mmol/L    CO2 30 21 - 32 mmol/L    Anion gap 5 (L) 7 - 16 mmol/L    Glucose 120 (H) 65 - 100 mg/dL    BUN 14 8 - 23 MG/DL    Creatinine 0.91 0.8 - 1.5 MG/DL    GFR est AA >60 >60 ml/min/1.73m2    GFR est non-AA >60 >60 ml/min/1.73m2    Calcium 9.4 8.3 - 10.4 MG/DL   PROTHROMBIN TIME + INR    Collection Time: 07/06/20 10:38 AM   Result Value Ref Range    Prothrombin time 13.1 12.0 - 14.7 sec    INR 1.0     PTT    Collection Time: 07/06/20 10:38 AM   Result Value Ref Range    aPTT 28.2 24.3 - 35.4 SEC   ALBUMIN    Collection Time: 07/06/20 10:38 AM   Result Value Ref Range    Albumin 3.5 3.2 - 4.6 g/dL   EKG, 12 LEAD, INITIAL    Collection Time: 07/06/20 11:11 AM   Result Value Ref Range    Ventricular Rate 77 BPM    Atrial Rate 77 BPM    P-R Interval 160 ms    QRS Duration 90 ms    Q-T Interval 398 ms    QTC Calculation (Bezet) 450 ms    Calculated P Axis 44 degrees    Calculated R Axis 0 degrees    Calculated T Axis 0 degrees    Diagnosis       Normal sinus rhythm  Minimal voltage criteria for LVH, may be normal variant  Inferior infarct , age undetermined  Cannot rule out Anterior infarct , age undetermined  Abnormal ECG  No previous ECGs available

## 2020-07-06 NOTE — PROGRESS NOTES
Vahe Files  : [birth year](69 y.o.) [address] at [facility]  [address], [address]  [phone]       Physical Therapy Prehab Plan of Treatment and Evaluation Summary:2020    ICD-10: Treatment Diagnosis:   · Pain in left hip (M25.552)  · Stiffness of Left Hip, Not elsewhere classified (M25.652)  · Difficulty in walking, Not elsewhere classified (R26.2)  Precautions/Allergies:   Patient has no known allergies. MEDICAL/REFERRING DIAGNOSIS:  Unilateral primary osteoarthritis, left hip [M16.12]  REFERRING PHYSICIAN: Haile Ambriz MD  DATE OF SURGERY: 20    Assessment:   Comments:  Pt. Plans to go home with wife who is here today. He needs a right valentino. PROBLEM LIST (Impacting functional limitations):  Mr. Simona Sheffield presents with the following left lower extremity(s) problems:  1. Strength  2. Range of Motion  3. Home Exercise Program  4. Pain   INTERVENTIONS PLANNED:  1. Home Exercise Program  2. Educational Discussion      TREATMENT PLAN: Effective Dates: 2020 TO 2020. Frequency/Duration: Patient to continue to perform home exercise program at least twice per day up until his surgery. GOALS: (Goals have been discussed and agreed upon with patient.)  Discharge Goals: Time Frame: 1 Day  1. Patient will demonstrate independence with a home exercise program designed to increase strength, range of motion and pain control to minimize functional deficits and optimize patient for total joint replacement. Rehabilitation Potential For Stated Goals: Good  Regarding Lewis Harrison's therapy, I certify that the treatment plan above will be carried out by a therapist or under their direction.   Thank you for this referral,  Sue Campbell, PT               HISTORY:   Present Symptoms:  Pain Intensity 1: (9 at worst)  Pain Location 1: Hip   History of Present Injury/Illness (Reason for Referral):  Medical/Referring Diagnosis: Unilateral primary osteoarthritis, left hip [M16.12]   Past Medical History/Comorbidities:   Mr. Zeinab Veras  has a past medical history of Anemia, Anxiety, Arthritis, Chronic pain, Cigar smoker, GERD (gastroesophageal reflux disease), History of colon cancer, Malignant neoplasm of ascending colon ([facility] [address] PUD (peptic ulcer disease) (1979), Rectal hemorrhage, and Weakness of right upper extremity. Mr. Zeinab Veras  has a past surgical history that includes hx cataract removal (Bilateral, 2017); hx heent (1988); hx cervical fusion (2013); hx lumbar fusion (1992); hx colonoscopy; and hx colectomy (Right, 07/2019).   Social History/Living Environment:   Home Environment: Private residence  # Steps to Enter: 1  Rails to Enter: No  One/Two Story Residence: One story(one step)  Living Alone: No  Support Systems: Spouse/Significant Other/Partner  Patient Expects to be Discharged to[de-identified] Private residence  Current DME Used/Available at Home: [employer], straight, Walker, rolling, Crutches  Tub or Shower Type: Shower  Work/Activity:  Retired, works on cars  Dominant Side:  RIGHT  Current Medications:  See [address] note   Number of Personal Factors/Comorbidities that affect the Plan of Care: 1-2: [address]:   ADLs (Current Functional Status):   Ambulation:  [] Independent  [] Walk Indoors Only  [] Walk Outdoors  [x] Use Assistive Device  [] Use Wheelchair Only Dressing:  [] Independent  Requires Assistance from Someone for:  [x] Sock/Shoes  [] Pants  [] Everything   Bathing/Showering:   [x] Independent  [] Requires Assistance from Someone  [] [address]:  [x] Routine house and yard work  [] Light Housework Only  [] None   Observation/Orthostatic Postural Assessment:   Exceptions to Insightly shoulders  ROM/Flexibility:   Gross Assessment: Yes  AROM: Generally decreased, functional(right LE, right hip limited)                LLE Assessment  LLE Assessment (WDL): Exception to WDL(left hip <3/5)  LLE AROM  L Hip Flexion: 100  L Hip ABduction: 10  L Knee Extension: 0          Strength:   Gross Assessment: Yes  Strength: Generally decreased, functional(right LE, right hip limited)                  Functional Mobility:    Gross Assessment: Yes    Gait Description (WDL): Exceptions to WDL  Stand to Sit: Additional time, Independent  Sit to Stand: Independent, Additional time  Distance (ft): 200 Feet (ft)  Ambulation - Level of Assistance: Independent  Speed/She: Delayed  Step Length: Left shortened;Right shortened  Stance: Left decreased  Gait Abnormalities: Antalgic;Trunk sway increased          Balance:    Sitting: Intact  Standing: Intact, Impaired  Standing - Static: Good  Standing - Dynamic : Fair   Body Structures Involved:  1. Bones  2. Joints  3. Muscles  4. Ligaments Body Functions Affected:  1. Movement Related Activities and Participation Affected:  1. Mobility   Number of elements that affect the Plan of Care: 3: MODERATE COMPLEXITY   CLINICAL PRESENTATION:   Presentation: Stable and uncomplicated: LOW COMPLEXITY   CLINICAL DECISION MAKING:   Outcome Measure: Tool Used: Lower Extremity Functional Scale (LEFS)  Score:  Initial: 28/80 Most Recent: X/80 (Date: -- )   Interpretation of Score: 20 questions each scored on a 5 point scale with 0 representing \"extreme difficulty or unable to perform\" and 4 representing \"no difficulty\". The lower the score, the greater the functional disability. 80/80 represents no disability. Minimal detectable change is 9 points. Medical Necessity:   · Mr. Antonella Brownlee is expected to optimize his lower extremity strength and ROM in preparation for joint replacement surgery. Reason for Services/Other Comments:  · Achieve baseline assesment of musculoskeletal system, functional mobility and home environment. , educate in PT HEP in preparation for surgery, educate in hospital plan of care.    Use of outcome tool(s) and clinical judgement create a POC that gives a: Clear prediction of patient's progress: LOW COMPLEXITY   TREATMENT:   Treatment/Session Assessment:  Patient was instructed in PT- HEP to increase strength and ROM in LEs. Answered all questions. · Post session pain:  Hip pain  · Compliance with Program/Exercises: compliant most of the time.   Total Treatment Duration:  PT Patient Time In/Time Out  Time In: 1100  Time Out: 10 Cherry Casas, PT

## 2020-07-06 NOTE — PROGRESS NOTES
07/06/20 1030   Oxygen Therapy   O2 Sat (%) 93 %   Pulse via Oximetry 93 beats per minute   O2 Device Room air   Pre-Treatment   Breath Sounds Bilateral Clear   Pre FEV1 (liters) 2.5 liters   % Predicted 87   Incentive Spirometry Treatment   Actual Volume (ml) 1750 ml     Initial respiratory Assessment completed with pt. Pt was interviewed and evaluated in Joint camp prior to surgery. Patient ID:  Kermit Petersen  [id number]  69 y.o.  1950  Surgeon: Dr. Kalyn Whittaker  Date of Surgery: 7/29/2020  Procedure: Total Left Hip Arthroplasty  Primary Care Physician: Devon Sarabia [phone]  Specialists:                      Pt instructed in the use of Incentive Spirometry. Pt instructed to bring Incentive Spirometer back on date of surgery & to start using Is upon return to pt room. Written instructions given to patient. Pt taught proper cough technique    History of smoking:   CURRENT SMOKER-     2 CIGARS/ DAILY    Smoking Cessation  \"SMOKING: YOUR PLAN TO QUIT\" handout given to pt. Pt taught to identify when anxiety or stress  is a trigger . Relaxation breathing technique taught to pt.                  Quit date:         Secondhand smoke:DENIES    Past procedures with Oxygen desaturation or delayed awakening:DENIES    Past Medical History:   Diagnosis Date    Anemia     Anxiety     Arthritis     OA    Chronic pain     headaches    Cigar smoker     GERD (gastroesophageal reflux disease)     controlled with medication    History of colon cancer     Malignant neoplasm of ascending colon (HCC)     PUD (peptic ulcer disease) 1979    Rectal hemorrhage     Weakness of right upper extremity         Respiratory history:DENIES SOB                                                                   Respiratory meds:  DENIES    FAMILY PRESENT:             NO                                                   PAST SLEEP STUDY:                         DENIES  HX OF BRYAN: DENIES  BRYAN assessment:                                               SLEEPS ON SIDE       &      BACK         &       STOMACH                                             PHYSICAL EXAM   Body mass index is 31.01 kg/m². Visit Vitals  /78 (BP 1 Location: Left arm, BP Patient Position: At rest;Sitting)   Pulse 67   Temp 98 °F (36.7 °C)   Resp 18   Ht 5' 7\" (1.702 m)   Wt 89.8 kg (198 lb)   SpO2 93%   BMI 31.01 kg/m²     Neck circumference:  43.5    cm    Loud snoring:                                                 YES             Witnessed apnea or wakening gasping or choking:             APNEA  Awakens with headaches:                                               YES  Morning or daytime tiredness/ sleepiness:                            TIRED  Dry mouth or sore throat in morning:            YES                                              [place] stage:  3                                   SACS score:25  Stop Bang   STOP-BANG  Does the patient snore loudly (louder than talking or loud enough to be heard through closed doors)?: Yes  Does the patient often feel tired, fatigued, or sleepy during the daytime, even after a \"good\" night's sleep?: Yes  Has anyone ever observed the patient stop breathing during their sleep? : Yes  Does the patient have or are they being treated for high blood pressure?: No  Is the patient's BMI greater than 35?: No  Is your neck circumference greater than 17 inches (Male) or 16 inches (Female)?: Yes  Is the patient older than 48?: Yes  Is the patient male?: Yes  BRYAN Score: 6  Has the patient been referred to Sleep Medicine?: No  Has the patient previously been diagnosed with Obstructive Sleep Apnea?: No                                  [facility]                                    Referrals:  DECLINED  Pt.  Phone Number:

## 2020-07-06 NOTE — PERIOP NOTES
Labs dated 7/6/20 routed via [address] [facility] to patients PCP, Dr. Windsor Schirmer, per Dr. Pravin Sánchez request.

## 2020-07-06 NOTE — PERIOP NOTES
Patient verified name and . Order for consent found in EHR and matches case posting; patient verified. Type 3 surgery, PAT joint assessment complete. Labs per surgeon: CBC,BMP, PT/PTT, Hemoglobin A1c, Albumin; results within anesthesia limits. Nicotine/Cotinine results pending-charge nurse to f/u. T&S DOS and POC glucose DOS; orders signed and held in EHR. Labs per anesthesia protocol: no additional  EKG: completed 20; results to be reviewed by anesthesia. NO previous EKG available. Charge nurse to f/u. Patient had lumbar fusion in . Patient unable to remember where procedure was performed and states no imaging has been completed since. Patient informed a Covid swab is required 7 days prior to surgery. The testing center is located at the [address], [place]. For questions or concerns the patient is advised to call [phone]. An appointment is required and the testing clinic is closed from 12-1 for lunch and on weekends. Appointment date/time 20 at 0800 found in EHR and provided to patient. MRSA/MSSA swab collected; pharmacy to review and dose antibiotic as appropriate. Hospital approved surgical skin cleanser and instructions to return bottle on DOS given per hospital policy. Patient provided with handouts including Guide to Surgery, Pain Management, Hand Hygiene, Blood Transfusion Education, and [place] [facility] Brochure. Patient answered medical/surgical history questions at their best of ability. All prior to admission medications documented in [facility] Care. Original medication prescription bottle NOT visualized during patient appointment. Patient instructed to hold all vitamins, supplements, herbals 3 weeks prior to surgery, NSAIDS 5 days prior to surgery, and Tylenol (Acetaminophen) 24 hours prior to surgery. Patient teach back successful and patient demonstrates knowledge of instruction.

## 2020-07-07 LAB
COTININE UR QL SCN: NEGATIVE NG/ML
DRUG SCREEN COMMENT:, 753798: NORMAL

## 2020-07-07 NOTE — ADVANCED PRACTICE NURSE
Total Joint Surgery Preoperative Chart Review      Patient ID:  Em Momin  [id number]  15 y.o.  1950  Surgeon: Dr. Inna Alexandra  Date of Surgery: 7/29/2020  Procedure: Total Left Hip Arthroplasty  Primary Care Physician: Neil Krueger [phone]  Specialty Physician(s):      Subjective:   Em Momin is a 69 y.o. WHITE [place]  male who presents for preoperative evaluation for Total Left Hip arthroplasty. This is a preoperative chart review note based on data collected by the nurse at the surgical Pre-Assessment visit. Past Medical History:   Diagnosis Date    Anemia     Anxiety     Arthritis     OA    Chronic pain     headaches    Cigar smoker     GERD (gastroesophageal reflux disease)     controlled with medication    History of colon cancer     Malignant neoplasm of ascending colon (HCC)     PUD (peptic ulcer disease) 1979    Rectal hemorrhage     Weakness of right upper extremity       Past Surgical History:   Procedure Laterality Date    HX CATARACT REMOVAL Bilateral 2017    HX CERVICAL FUSION  2013    HX COLECTOMY Right 07/2019    HX COLONOSCOPY      HX HEENT  1988    acoustic neuroma    HX LUMBAR FUSION  1992     Family History   Problem Relation Age of Onset    Cancer Mother         breast    Diabetes Mother     Hypertension Mother     Heart Attack Father     Hypertension Brother       Social History     Tobacco Use    Smoking status: Current Some Day Smoker     Types: Cigars    Smokeless tobacco: Former User     Quit date: 2018    Tobacco comment: occ. cigar   Substance Use Topics    Alcohol use: Yes     Alcohol/week: 5.0 - 6.0 standard drinks     Types: 5 - 6 Cans of beer per week       Prior to Admission medications    Medication Sig Start Date End Date Taking? Authorizing Provider   celecoxib (CeleBREX) 200 mg capsule Take  by mouth two (2) times a day.    Yes Provider, Historical   traMADoL (ULTRAM) 50 mg tablet Take 50 mg by mouth every six (6) hours as needed for Pain. Yes Provider, Historical   multivitamin (ONE A DAY) tablet Take 1 Tab by mouth daily. Yes Provider, Historical   aspirin delayed-release 81 mg tablet Take  by mouth daily. Yes Provider, Historical   cyanocobalamin 1,000 mcg tablet Take 1,000 mcg by mouth daily. Yes Provider, Historical   omeprazole (PRILOSEC) 20 mg capsule Take 40 mg by mouth. Yes Provider, Historical   sertraline (ZOLOFT) 100 mg tablet 200 mg nightly.  6/12/19  Yes Provider, Historical     No Known Allergies       Objective:     Physical Exam:   Patient Vitals for the past 24 hrs:   Temp Pulse Resp BP SpO2   07/06/20 1205 98 °F (36.7 °C) 67 18 152/78 93 %       ECG:    EKG Results     Procedure 720 Value Units Date/Time    EKG, 12 LEAD, INITIAL [[id number]] Collected:  07/06/20 1111    Order Status:  Completed Updated:  07/06/20 1616     Ventricular Rate 77 BPM      Atrial Rate 77 BPM      P-R Interval 160 ms      QRS Duration 90 ms      Q-T Interval 398 ms      QTC Calculation (Bezet) 450 ms      Calculated P Axis 44 degrees      Calculated R Axis 0 degrees      Calculated T Axis 0 degrees      Diagnosis --     Normal sinus rhythm  Minimal voltage criteria for LVH, may be normal variant  Inferior infarct , age undetermined  Cannot rule out Anterior infarct , age undetermined  Abnormal ECG  No previous ECGs available  Confirmed by Gisella Pettit MD (), Kenan Perkins ([id number]) on 7/6/2020 4:16:20 PM            Data Review:   Labs:   Recent Results (from the past 24 hour(s))   EKG, 12 LEAD, INITIAL    Collection Time: 07/06/20 11:11 AM   Result Value Ref Range    Ventricular Rate 77 BPM    Atrial Rate 77 BPM    P-R Interval 160 ms    QRS Duration 90 ms    Q-T Interval 398 ms    QTC Calculation (Bezet) 450 ms    Calculated P Axis 44 degrees    Calculated R Axis 0 degrees    Calculated T Axis 0 degrees    Diagnosis       Normal sinus rhythm  Minimal voltage criteria for LVH, may be normal variant  Inferior infarct , age undetermined  Cannot rule out Anterior infarct , age undetermined  Abnormal ECG  No previous ECGs available  Confirmed by [facility]  MD ()DORIS ([id number]) on 7/6/2020 4:16:20 PM     MSSA/MRSA SC BY PCR, NASAL SWAB    Collection Time: 07/06/20 11:51 AM   Result Value Ref Range    Special Requests: NO SPECIAL REQUESTS      Culture result: (A)       MRSA target DNA not detected, SA target DNA detected. A MRSA negative, SA positive test result does not preclude MRSA nasal colonization. Labs reviewed    Problem List:  )  Patient Active Problem List   Diagnosis Code    Malignant neoplasm of ascending colon ([facility] [address] C18.2    Colon cancer ([facility] [address] C18.9       Total Joint Surgery Pre-Assessment Recommendations:           Recommend continuous saturation monitoring hours of sleep, during hospitalization.       Signed By: LORNA Sumner    July 7, 2020

## 2020-07-09 NOTE — PERIOP NOTES
NICOTINE/COTININE, UR, QT [[id number]] (Order [id number])   Lab   Date: 7/6/2020  Department: [facility] Pre Assessment  Released By: Thierno Kruger (auto-released)  Authorizing: Chidi Joseph MD    Component  Value  Flag  Ref Range  Units  Status    Cotinine Screen, urine  Negative   Wedklt=937  ng/mL  Final    Drug Screen Comment:  Comment

## 2020-07-28 ENCOUNTER — ANESTHESIA EVENT (OUTPATIENT)
Dept: SURGERY | Age: 70
DRG: 470 | End: 2020-07-28
Payer: MEDICARE

## 2020-07-28 NOTE — ANESTHESIA PREPROCEDURE EVALUATION
Relevant Problems   No relevant active problems       Anesthetic History               Review of Systems / Medical History  Patient summary reviewed, nursing notes reviewed and pertinent labs reviewed    Pulmonary                   Neuro/Psych              Cardiovascular                  Exercise tolerance: >4 METS     GI/Hepatic/Renal     GERD: well controlled      PUD (remote hx)     Endo/Other        Arthritis and cancer (colon)     Other Findings              Physical Exam    Airway  Mallampati: II  TM Distance: > 6 cm  Neck ROM: decreased range of motion   Mouth opening: Normal     Cardiovascular  Regular rate and rhythm,  S1 and S2 normal,  no murmur, click, rub, or gallop             Dental    Dentition: Caps/crowns and Bridges     Pulmonary  Breath sounds clear to auscultation               Abdominal  GI exam deferred       Other Findings            Anesthetic Plan    ASA: 2  Anesthesia type: spinal            Anesthetic plan and risks discussed with: Patient

## 2020-07-29 ENCOUNTER — ANESTHESIA (OUTPATIENT)
Dept: SURGERY | Age: 70
DRG: 470 | End: 2020-07-29
Payer: MEDICARE

## 2020-07-29 ENCOUNTER — APPOINTMENT (OUTPATIENT)
Dept: GENERAL RADIOLOGY | Age: 70
DRG: 470 | End: 2020-07-29
Attending: ORTHOPAEDIC SURGERY
Payer: MEDICARE

## 2020-07-29 ENCOUNTER — HOSPITAL ENCOUNTER (INPATIENT)
Age: 70
LOS: 1 days | Discharge: HOME HEALTH | DRG: 470 | End: 2020-07-30
Attending: ORTHOPAEDIC SURGERY | Admitting: ORTHOPAEDIC SURGERY
Payer: MEDICARE

## 2020-07-29 DIAGNOSIS — M16.12 PRIMARY OSTEOARTHRITIS OF LEFT HIP: Primary | ICD-10-CM

## 2020-07-29 LAB
GLUCOSE BLD STRIP.AUTO-MCNC: 129 MG/DL (ref 65–100)
HGB BLD-MCNC: 11.8 G/DL (ref 13.6–17.2)

## 2020-07-29 PROCEDURE — 77030002933 HC SUT MCRYL J&J -A: Performed by: ORTHOPAEDIC SURGERY

## 2020-07-29 PROCEDURE — 76010000162 HC OR TIME 1.5 TO 2 HR INTENSV-TIER 1: Performed by: ORTHOPAEDIC SURGERY

## 2020-07-29 PROCEDURE — 74011000250 HC RX REV CODE- 250: Performed by: NURSE ANESTHETIST, CERTIFIED REGISTERED

## 2020-07-29 PROCEDURE — 65270000029 HC RM PRIVATE

## 2020-07-29 PROCEDURE — 97110 THERAPEUTIC EXERCISES: CPT

## 2020-07-29 PROCEDURE — 77030007880 HC KT SPN EPDRL BBMI -B: Performed by: ANESTHESIOLOGY

## 2020-07-29 PROCEDURE — 77030019557 HC ELECTRD VES SEAL MEDT -F: Performed by: ORTHOPAEDIC SURGERY

## 2020-07-29 PROCEDURE — 74011250636 HC RX REV CODE- 250/636: Performed by: ORTHOPAEDIC SURGERY

## 2020-07-29 PROCEDURE — 74011250636 HC RX REV CODE- 250/636: Performed by: NURSE ANESTHETIST, CERTIFIED REGISTERED

## 2020-07-29 PROCEDURE — 74011250636 HC RX REV CODE- 250/636: Performed by: NURSE PRACTITIONER

## 2020-07-29 PROCEDURE — 74011250636 HC RX REV CODE- 250/636: Performed by: ANESTHESIOLOGY

## 2020-07-29 PROCEDURE — 77030033067 HC SUT PDO STRATFX SPIR J&J -B: Performed by: ORTHOPAEDIC SURGERY

## 2020-07-29 PROCEDURE — 72170 X-RAY EXAM OF PELVIS: CPT

## 2020-07-29 PROCEDURE — 74011250637 HC RX REV CODE- 250/637: Performed by: ANESTHESIOLOGY

## 2020-07-29 PROCEDURE — 82962 GLUCOSE BLOOD TEST: CPT

## 2020-07-29 PROCEDURE — C1713 ANCHOR/SCREW BN/BN,TIS/BN: HCPCS | Performed by: ORTHOPAEDIC SURGERY

## 2020-07-29 PROCEDURE — 77030018836 HC SOL IRR NACL ICUM -A: Performed by: ORTHOPAEDIC SURGERY

## 2020-07-29 PROCEDURE — 94762 N-INVAS EAR/PLS OXIMTRY CONT: CPT

## 2020-07-29 PROCEDURE — 76060000034 HC ANESTHESIA 1.5 TO 2 HR: Performed by: ORTHOPAEDIC SURGERY

## 2020-07-29 PROCEDURE — 77030013708 HC HNDPC SUC IRR PULS STRY –B: Performed by: ORTHOPAEDIC SURGERY

## 2020-07-29 PROCEDURE — 74011250637 HC RX REV CODE- 250/637: Performed by: NURSE PRACTITIONER

## 2020-07-29 PROCEDURE — 77030040922 HC BLNKT HYPOTHRM STRY -A: Performed by: ANESTHESIOLOGY

## 2020-07-29 PROCEDURE — 0SRB03A REPLACEMENT OF LEFT HIP JOINT WITH CERAMIC SYNTHETIC SUBSTITUTE, UNCEMENTED, OPEN APPROACH: ICD-10-PCS | Performed by: ORTHOPAEDIC SURGERY

## 2020-07-29 PROCEDURE — 77030006835 HC BLD SAW SAG STRY -B: Performed by: ORTHOPAEDIC SURGERY

## 2020-07-29 PROCEDURE — 77030033138 HC SUT PGA STRATFX J&J -B: Performed by: ORTHOPAEDIC SURGERY

## 2020-07-29 PROCEDURE — 76210000006 HC OR PH I REC 0.5 TO 1 HR: Performed by: ORTHOPAEDIC SURGERY

## 2020-07-29 PROCEDURE — 74011000250 HC RX REV CODE- 250: Performed by: ANESTHESIOLOGY

## 2020-07-29 PROCEDURE — 97161 PT EVAL LOW COMPLEX 20 MIN: CPT

## 2020-07-29 PROCEDURE — C1776 JOINT DEVICE (IMPLANTABLE): HCPCS | Performed by: ORTHOPAEDIC SURGERY

## 2020-07-29 PROCEDURE — 85018 HEMOGLOBIN: CPT

## 2020-07-29 PROCEDURE — 94760 N-INVAS EAR/PLS OXIMETRY 1: CPT

## 2020-07-29 PROCEDURE — 77030003665 HC NDL SPN BBMI -A: Performed by: ANESTHESIOLOGY

## 2020-07-29 PROCEDURE — 77030040361 HC SLV COMPR DVT MDII -B

## 2020-07-29 PROCEDURE — 36415 COLL VENOUS BLD VENIPUNCTURE: CPT

## 2020-07-29 PROCEDURE — 77030034479 HC ADH SKN CLSR PRINEO J&J -B: Performed by: ORTHOPAEDIC SURGERY

## 2020-07-29 PROCEDURE — 97165 OT EVAL LOW COMPLEX 30 MIN: CPT

## 2020-07-29 PROCEDURE — 77030002922 HC SUT FBRWRE ARTH -B: Performed by: ORTHOPAEDIC SURGERY

## 2020-07-29 PROCEDURE — 97535 SELF CARE MNGMENT TRAINING: CPT

## 2020-07-29 PROCEDURE — 74011000250 HC RX REV CODE- 250: Performed by: NURSE PRACTITIONER

## 2020-07-29 DEVICE — HEAD FEM DELT V40 +0MM NK 36MM -- V40 BIOLOX: Type: IMPLANTABLE DEVICE | Site: HIP | Status: FUNCTIONAL

## 2020-07-29 DEVICE — HIP H3 TOT ADV DUAL MOB -- IMPL CAPPED H3: Type: IMPLANTABLE DEVICE | Status: FUNCTIONAL

## 2020-07-29 DEVICE — SHELL ACET CLUS H 56F TRTANIUM -- TRIDENT II: Type: IMPLANTABLE DEVICE | Site: HIP | Status: FUNCTIONAL

## 2020-07-29 DEVICE — LINER ACET SZ F ID36MM THK7.9MM 0DEG HIP X3 LOK RNG FOR: Type: IMPLANTABLE DEVICE | Site: HIP | Status: FUNCTIONAL

## 2020-07-29 DEVICE — STEM FEM SZ 4 127D 35X105MM -- ACCOLADE II V40: Type: IMPLANTABLE DEVICE | Site: HIP | Status: FUNCTIONAL

## 2020-07-29 RX ORDER — DEXAMETHASONE SODIUM PHOSPHATE 100 MG/10ML
10 INJECTION INTRAMUSCULAR; INTRAVENOUS ONCE
Status: DISCONTINUED | OUTPATIENT
Start: 2020-07-30 | End: 2020-07-30 | Stop reason: HOSPADM

## 2020-07-29 RX ORDER — NALOXONE HYDROCHLORIDE 0.4 MG/ML
.2-.4 INJECTION, SOLUTION INTRAMUSCULAR; INTRAVENOUS; SUBCUTANEOUS
Status: DISCONTINUED | OUTPATIENT
Start: 2020-07-29 | End: 2020-07-30 | Stop reason: HOSPADM

## 2020-07-29 RX ORDER — SODIUM CHLORIDE 0.9 % (FLUSH) 0.9 %
5-40 SYRINGE (ML) INJECTION AS NEEDED
Status: DISCONTINUED | OUTPATIENT
Start: 2020-07-29 | End: 2020-07-30 | Stop reason: HOSPADM

## 2020-07-29 RX ORDER — CYCLOBENZAPRINE HCL 10 MG
5 TABLET ORAL
Status: DISCONTINUED | OUTPATIENT
Start: 2020-07-29 | End: 2020-07-30 | Stop reason: HOSPADM

## 2020-07-29 RX ORDER — SODIUM CHLORIDE, SODIUM LACTATE, POTASSIUM CHLORIDE, CALCIUM CHLORIDE 600; 310; 30; 20 MG/100ML; MG/100ML; MG/100ML; MG/100ML
125 INJECTION, SOLUTION INTRAVENOUS CONTINUOUS
Status: DISCONTINUED | OUTPATIENT
Start: 2020-07-29 | End: 2020-07-29 | Stop reason: HOSPADM

## 2020-07-29 RX ORDER — NALOXONE HYDROCHLORIDE 0.4 MG/ML
0.1 INJECTION, SOLUTION INTRAMUSCULAR; INTRAVENOUS; SUBCUTANEOUS AS NEEDED
Status: DISCONTINUED | OUTPATIENT
Start: 2020-07-29 | End: 2020-07-30 | Stop reason: HOSPADM

## 2020-07-29 RX ORDER — MELOXICAM 7.5 MG/1
7.5 TABLET ORAL 2 TIMES DAILY
Status: DISCONTINUED | OUTPATIENT
Start: 2020-07-30 | End: 2020-07-30 | Stop reason: HOSPADM

## 2020-07-29 RX ORDER — ROPIVACAINE HYDROCHLORIDE 2 MG/ML
INJECTION, SOLUTION EPIDURAL; INFILTRATION; PERINEURAL AS NEEDED
Status: DISCONTINUED | OUTPATIENT
Start: 2020-07-29 | End: 2020-07-29 | Stop reason: HOSPADM

## 2020-07-29 RX ORDER — BUPIVACAINE HYDROCHLORIDE 7.5 MG/ML
INJECTION, SOLUTION INTRASPINAL AS NEEDED
Status: DISCONTINUED | OUTPATIENT
Start: 2020-07-29 | End: 2020-07-29 | Stop reason: HOSPADM

## 2020-07-29 RX ORDER — ONDANSETRON 8 MG/1
8 TABLET, ORALLY DISINTEGRATING ORAL
Status: DISCONTINUED | OUTPATIENT
Start: 2020-07-29 | End: 2020-07-30 | Stop reason: HOSPADM

## 2020-07-29 RX ORDER — DIPHENHYDRAMINE HCL 25 MG
25 CAPSULE ORAL
Status: DISCONTINUED | OUTPATIENT
Start: 2020-07-29 | End: 2020-07-30 | Stop reason: HOSPADM

## 2020-07-29 RX ORDER — LIDOCAINE HYDROCHLORIDE 10 MG/ML
0.1 INJECTION INFILTRATION; PERINEURAL AS NEEDED
Status: DISCONTINUED | OUTPATIENT
Start: 2020-07-29 | End: 2020-07-29 | Stop reason: HOSPADM

## 2020-07-29 RX ORDER — ONDANSETRON 4 MG/1
4 TABLET, ORALLY DISINTEGRATING ORAL
Status: DISCONTINUED | OUTPATIENT
Start: 2020-07-29 | End: 2020-07-30 | Stop reason: HOSPADM

## 2020-07-29 RX ORDER — AMOXICILLIN 250 MG
2 CAPSULE ORAL DAILY
Status: DISCONTINUED | OUTPATIENT
Start: 2020-07-30 | End: 2020-07-30 | Stop reason: HOSPADM

## 2020-07-29 RX ORDER — PROPOFOL 10 MG/ML
INJECTION, EMULSION INTRAVENOUS
Status: DISCONTINUED | OUTPATIENT
Start: 2020-07-29 | End: 2020-07-29 | Stop reason: HOSPADM

## 2020-07-29 RX ORDER — PANTOPRAZOLE SODIUM 40 MG/1
40 TABLET, DELAYED RELEASE ORAL
Status: DISCONTINUED | OUTPATIENT
Start: 2020-07-30 | End: 2020-07-30 | Stop reason: HOSPADM

## 2020-07-29 RX ORDER — KETOROLAC TROMETHAMINE 30 MG/ML
INJECTION, SOLUTION INTRAMUSCULAR; INTRAVENOUS AS NEEDED
Status: DISCONTINUED | OUTPATIENT
Start: 2020-07-29 | End: 2020-07-29 | Stop reason: HOSPADM

## 2020-07-29 RX ORDER — ONDANSETRON 8 MG/1
8 TABLET, ORALLY DISINTEGRATING ORAL
Qty: 30 TAB | Refills: 0 | Status: ON HOLD | OUTPATIENT
Start: 2020-07-29 | End: 2020-09-23 | Stop reason: SDUPTHER

## 2020-07-29 RX ORDER — SODIUM CHLORIDE 9 MG/ML
100 INJECTION, SOLUTION INTRAVENOUS CONTINUOUS
Status: DISCONTINUED | OUTPATIENT
Start: 2020-07-29 | End: 2020-07-30 | Stop reason: HOSPADM

## 2020-07-29 RX ORDER — HYDROMORPHONE HYDROCHLORIDE 2 MG/ML
0.5 INJECTION, SOLUTION INTRAMUSCULAR; INTRAVENOUS; SUBCUTANEOUS
Status: DISCONTINUED | OUTPATIENT
Start: 2020-07-29 | End: 2020-07-29 | Stop reason: HOSPADM

## 2020-07-29 RX ORDER — OXYCODONE HYDROCHLORIDE 5 MG/1
10 TABLET ORAL
Status: ACTIVE | OUTPATIENT
Start: 2020-07-29 | End: 2020-07-30

## 2020-07-29 RX ORDER — SODIUM CHLORIDE 0.9 % (FLUSH) 0.9 %
5-40 SYRINGE (ML) INJECTION EVERY 8 HOURS
Status: DISCONTINUED | OUTPATIENT
Start: 2020-07-29 | End: 2020-07-30 | Stop reason: HOSPADM

## 2020-07-29 RX ORDER — HYDROMORPHONE HYDROCHLORIDE 2 MG/1
2 TABLET ORAL
Qty: 40 TAB | Refills: 0 | Status: SHIPPED | OUTPATIENT
Start: 2020-07-29 | End: 2020-08-05

## 2020-07-29 RX ORDER — SODIUM CHLORIDE, SODIUM LACTATE, POTASSIUM CHLORIDE, CALCIUM CHLORIDE 600; 310; 30; 20 MG/100ML; MG/100ML; MG/100ML; MG/100ML
125 INJECTION, SOLUTION INTRAVENOUS CONTINUOUS
Status: DISCONTINUED | OUTPATIENT
Start: 2020-07-29 | End: 2020-07-30 | Stop reason: HOSPADM

## 2020-07-29 RX ORDER — HYDROMORPHONE HYDROCHLORIDE 1 MG/ML
1 INJECTION, SOLUTION INTRAMUSCULAR; INTRAVENOUS; SUBCUTANEOUS
Status: DISCONTINUED | OUTPATIENT
Start: 2020-07-29 | End: 2020-07-30 | Stop reason: HOSPADM

## 2020-07-29 RX ORDER — LANOLIN ALCOHOL/MO/W.PET/CERES
1000 CREAM (GRAM) TOPICAL DAILY
Status: DISCONTINUED | OUTPATIENT
Start: 2020-07-29 | End: 2020-07-30 | Stop reason: HOSPADM

## 2020-07-29 RX ORDER — CYCLOBENZAPRINE HCL 5 MG
5 TABLET ORAL
Qty: 30 TAB | Refills: 0 | Status: ON HOLD | OUTPATIENT
Start: 2020-07-29 | End: 2020-09-23 | Stop reason: SDUPTHER

## 2020-07-29 RX ORDER — MELOXICAM 7.5 MG/1
7.5 TABLET ORAL 2 TIMES DAILY
Qty: 60 TAB | Refills: 2 | Status: SHIPPED | OUTPATIENT
Start: 2020-07-29 | End: 2020-09-16

## 2020-07-29 RX ORDER — ASPIRIN 81 MG/1
81 TABLET ORAL EVERY 12 HOURS
Status: DISCONTINUED | OUTPATIENT
Start: 2020-07-29 | End: 2020-07-30 | Stop reason: HOSPADM

## 2020-07-29 RX ORDER — EPHEDRINE SULFATE/0.9% NACL/PF 50 MG/5 ML
SYRINGE (ML) INTRAVENOUS AS NEEDED
Status: DISCONTINUED | OUTPATIENT
Start: 2020-07-29 | End: 2020-07-29 | Stop reason: HOSPADM

## 2020-07-29 RX ORDER — CEFAZOLIN SODIUM/WATER 2 G/20 ML
2 SYRINGE (ML) INTRAVENOUS EVERY 8 HOURS
Status: COMPLETED | OUTPATIENT
Start: 2020-07-29 | End: 2020-07-30

## 2020-07-29 RX ORDER — TRANEXAMIC ACID 650 1/1
1300 TABLET ORAL
Status: COMPLETED | OUTPATIENT
Start: 2020-07-29 | End: 2020-07-29

## 2020-07-29 RX ORDER — KETOROLAC TROMETHAMINE 15 MG/ML
15 INJECTION, SOLUTION INTRAMUSCULAR; INTRAVENOUS EVERY 8 HOURS
Status: COMPLETED | OUTPATIENT
Start: 2020-07-29 | End: 2020-07-30

## 2020-07-29 RX ORDER — AMOXICILLIN 250 MG
1 CAPSULE ORAL DAILY
Qty: 30 TAB | Refills: 0 | Status: ON HOLD | OUTPATIENT
Start: 2020-07-29 | End: 2020-09-23 | Stop reason: SDUPTHER

## 2020-07-29 RX ORDER — CEFAZOLIN SODIUM/WATER 2 G/20 ML
2 SYRINGE (ML) INTRAVENOUS ONCE
Status: COMPLETED | OUTPATIENT
Start: 2020-07-29 | End: 2020-07-29

## 2020-07-29 RX ORDER — ASPIRIN 81 MG/1
81 TABLET ORAL 2 TIMES DAILY
Qty: 60 TAB | Refills: 0 | Status: ON HOLD | OUTPATIENT
Start: 2020-07-29 | End: 2020-09-23 | Stop reason: SDUPTHER

## 2020-07-29 RX ORDER — CEFAZOLIN SODIUM/WATER 2 G/20 ML
2 SYRINGE (ML) INTRAVENOUS ONCE
Status: DISCONTINUED | OUTPATIENT
Start: 2020-07-29 | End: 2020-07-29 | Stop reason: SDUPTHER

## 2020-07-29 RX ORDER — GABAPENTIN 100 MG/1
100 CAPSULE ORAL 2 TIMES DAILY
Qty: 30 CAP | Refills: 0 | Status: ON HOLD | OUTPATIENT
Start: 2020-07-29 | End: 2020-09-23 | Stop reason: SDUPTHER

## 2020-07-29 RX ORDER — SERTRALINE HYDROCHLORIDE 100 MG/1
200 TABLET, FILM COATED ORAL
Status: DISCONTINUED | OUTPATIENT
Start: 2020-07-29 | End: 2020-07-30 | Stop reason: HOSPADM

## 2020-07-29 RX ORDER — GABAPENTIN 100 MG/1
100 CAPSULE ORAL 2 TIMES DAILY
Status: DISCONTINUED | OUTPATIENT
Start: 2020-07-29 | End: 2020-07-30 | Stop reason: HOSPADM

## 2020-07-29 RX ORDER — ACETAMINOPHEN 500 MG
1000 TABLET ORAL
Qty: 60 TAB | Refills: 0 | Status: ON HOLD | OUTPATIENT
Start: 2020-07-29 | End: 2020-09-23 | Stop reason: SDUPTHER

## 2020-07-29 RX ORDER — CELECOXIB 200 MG/1
200 CAPSULE ORAL ONCE
Status: COMPLETED | OUTPATIENT
Start: 2020-07-29 | End: 2020-07-29

## 2020-07-29 RX ORDER — ACETAMINOPHEN 500 MG
1000 TABLET ORAL EVERY 6 HOURS
Status: DISCONTINUED | OUTPATIENT
Start: 2020-07-30 | End: 2020-07-30 | Stop reason: HOSPADM

## 2020-07-29 RX ORDER — HYDROMORPHONE HYDROCHLORIDE 2 MG/1
2 TABLET ORAL
Status: DISCONTINUED | OUTPATIENT
Start: 2020-07-29 | End: 2020-07-30 | Stop reason: HOSPADM

## 2020-07-29 RX ADMIN — PHENYLEPHRINE HYDROCHLORIDE 100 MCG: 10 INJECTION INTRAVENOUS at 09:00

## 2020-07-29 RX ADMIN — Medication 2 G: at 08:19

## 2020-07-29 RX ADMIN — PHENYLEPHRINE HYDROCHLORIDE 100 MCG: 10 INJECTION INTRAVENOUS at 09:20

## 2020-07-29 RX ADMIN — PHENYLEPHRINE HYDROCHLORIDE 100 MCG: 10 INJECTION INTRAVENOUS at 09:09

## 2020-07-29 RX ADMIN — CEFAZOLIN SODIUM 2 G: 100 INJECTION, POWDER, LYOPHILIZED, FOR SOLUTION INTRAVENOUS at 15:58

## 2020-07-29 RX ADMIN — CELECOXIB 200 MG: 200 CAPSULE ORAL at 06:41

## 2020-07-29 RX ADMIN — Medication 10 MG: at 08:41

## 2020-07-29 RX ADMIN — PHENYLEPHRINE HYDROCHLORIDE 100 MCG: 10 INJECTION INTRAVENOUS at 09:04

## 2020-07-29 RX ADMIN — ASPIRIN 81 MG: 81 TABLET, COATED ORAL at 21:40

## 2020-07-29 RX ADMIN — PROPOFOL 100 MCG/KG/MIN: 10 INJECTION, EMULSION INTRAVENOUS at 08:40

## 2020-07-29 RX ADMIN — Medication 3 AMPULE: at 06:41

## 2020-07-29 RX ADMIN — Medication 10 MG: at 08:51

## 2020-07-29 RX ADMIN — PHENYLEPHRINE HYDROCHLORIDE 100 MCG: 10 INJECTION INTRAVENOUS at 09:13

## 2020-07-29 RX ADMIN — PHENYLEPHRINE HYDROCHLORIDE 100 MCG: 10 INJECTION INTRAVENOUS at 08:38

## 2020-07-29 RX ADMIN — PHENYLEPHRINE HYDROCHLORIDE 100 MCG: 10 INJECTION INTRAVENOUS at 08:48

## 2020-07-29 RX ADMIN — MULTIPLE VITAMINS W/ MINERALS TAB 1 TABLET: TAB at 12:20

## 2020-07-29 RX ADMIN — SERTRALINE HYDROCHLORIDE 200 MG: 100 TABLET ORAL at 21:40

## 2020-07-29 RX ADMIN — SODIUM CHLORIDE 20 MCG/MIN: 900 INJECTION, SOLUTION INTRAVENOUS at 09:20

## 2020-07-29 RX ADMIN — Medication 10 MG: at 08:37

## 2020-07-29 RX ADMIN — LIDOCAINE HYDROCHLORIDE 0.1 ML: 10 INJECTION, SOLUTION INFILTRATION; PERINEURAL at 06:41

## 2020-07-29 RX ADMIN — PHENYLEPHRINE HYDROCHLORIDE 100 MCG: 10 INJECTION INTRAVENOUS at 08:53

## 2020-07-29 RX ADMIN — GABAPENTIN 100 MG: 100 CAPSULE ORAL at 17:59

## 2020-07-29 RX ADMIN — Medication 1 AMPULE: at 21:40

## 2020-07-29 RX ADMIN — Medication 10 MG: at 08:47

## 2020-07-29 RX ADMIN — HYDROMORPHONE HYDROCHLORIDE 2 MG: 2 TABLET ORAL at 14:06

## 2020-07-29 RX ADMIN — KETOROLAC TROMETHAMINE 15 MG: 15 INJECTION, SOLUTION INTRAMUSCULAR; INTRAVENOUS at 21:40

## 2020-07-29 RX ADMIN — PHENYLEPHRINE HYDROCHLORIDE 100 MCG: 10 INJECTION INTRAVENOUS at 08:42

## 2020-07-29 RX ADMIN — Medication 10 MG: at 08:56

## 2020-07-29 RX ADMIN — PHENYLEPHRINE HYDROCHLORIDE 100 MCG: 10 INJECTION INTRAVENOUS at 09:17

## 2020-07-29 RX ADMIN — PHENYLEPHRINE HYDROCHLORIDE 100 MCG: 10 INJECTION INTRAVENOUS at 08:56

## 2020-07-29 RX ADMIN — CYANOCOBALAMIN TAB 1000 MCG 1000 MCG: 1000 TAB at 12:20

## 2020-07-29 RX ADMIN — Medication 5 ML: at 22:00

## 2020-07-29 RX ADMIN — BUPIVACAINE HYDROCHLORIDE IN DEXTROSE 2 ML: 7.5 INJECTION, SOLUTION SUBARACHNOID at 08:36

## 2020-07-29 RX ADMIN — TRANEXAMIC ACID 1000 MG: 100 INJECTION, SOLUTION INTRAVENOUS at 09:23

## 2020-07-29 RX ADMIN — TRANEXAMIC ACID 1300 MG: 650 TABLET ORAL at 15:58

## 2020-07-29 RX ADMIN — TRANEXAMIC ACID 1300 MG: 650 TABLET ORAL at 12:20

## 2020-07-29 RX ADMIN — KETOROLAC TROMETHAMINE 15 MG: 15 INJECTION, SOLUTION INTRAMUSCULAR; INTRAVENOUS at 14:06

## 2020-07-29 RX ADMIN — SODIUM CHLORIDE, SODIUM LACTATE, POTASSIUM CHLORIDE, AND CALCIUM CHLORIDE: 600; 310; 30; 20 INJECTION, SOLUTION INTRAVENOUS at 09:20

## 2020-07-29 RX ADMIN — Medication 10 ML: at 14:06

## 2020-07-29 RX ADMIN — TRANEXAMIC ACID 1000 MG: 100 INJECTION, SOLUTION INTRAVENOUS at 08:47

## 2020-07-29 RX ADMIN — SODIUM CHLORIDE, SODIUM LACTATE, POTASSIUM CHLORIDE, AND CALCIUM CHLORIDE 125 ML/HR: 600; 310; 30; 20 INJECTION, SOLUTION INTRAVENOUS at 06:41

## 2020-07-29 NOTE — H&P
[facility]  Pre Operative History and Physical Exam    Patient ID:  Darrian Ortega  [id number]  3 y.o.  1950    Today: July 29, 2020       CC:  Left hip pain    HPI:   The patient has end stage arthritis of the left hip. The patient was evaluated and examined in the office prior to today and was found to have a history and physical exam consistent with intra-articular pathology of the left hip. Patient complains of anterior groin pain predominately. Pain occurs during activity. Patient has difficulty putting on socks/shoes and has notable pain when getting up from a chair and getting out of a car. Patient does not complain of significant lateral hip pain or radicular pain down the leg. There have been no changes to the patient's orthopedic condition since the last office visit    Past Medical History:  Past Medical History:   Diagnosis Date    Anemia     Anxiety     Arthritis     OA    Chronic pain     headaches    Cigar smoker     GERD (gastroesophageal reflux disease)     controlled with medication    History of colon cancer     Malignant neoplasm of ascending colon ([facility] [address]     PUD (peptic ulcer disease) 1979    Rectal hemorrhage     Weakness of right upper extremity        Past Surgical History:  Past Surgical History:   Procedure Laterality Date    HX CATARACT REMOVAL Bilateral 2017    HX CERVICAL FUSION  2013    HX COLECTOMY Right 07/2019    HX COLONOSCOPY      HX HEENT  1988    acoustic neuroma    HX LUMBAR FUSION  1992        Medications:     Prior to Admission medications    Medication Sig Start Date End Date Taking? Authorizing Provider   celecoxib (CeleBREX) 200 mg capsule Take  by mouth two (2) times a day. Yes Provider, Historical   multivitamin (ONE A DAY) tablet Take 1 Tab by mouth daily. Yes Provider, Historical   aspirin delayed-release 81 mg tablet Take  by mouth daily.    Yes Provider, Historical   cyanocobalamin 1,000 mcg tablet Take 1,000 mcg by mouth daily. Yes Provider, Historical   omeprazole (PRILOSEC) 20 mg capsule Take 40 mg by mouth. Yes Provider, Historical   sertraline (ZOLOFT) 100 mg tablet 200 mg nightly. 6/12/19  Yes Provider, Historical   traMADoL (ULTRAM) 50 mg tablet Take 50 mg by mouth every six (6) hours as needed for Pain. Provider, Historical       Family History:     Family History   Problem Relation Age of Onset    Cancer Mother         breast    Diabetes Mother     Hypertension Mother     Heart Attack Father     Hypertension Brother        Social History:      Social History     Tobacco Use    Smoking status: Current Some Day Smoker     Types: Cigars    Smokeless tobacco: Former User     Quit date: 2018    Tobacco comment: occ. cigar   Substance Use Topics    Alcohol use: Yes     Alcohol/week: 5.0 - 6.0 standard drinks     Types: 5 - 6 Cans of beer per week         Allergies:    No Known Allergies     Vitals:      Visit Vitals  /80 (BP 1 Location: Left arm, BP Patient Position: At rest)   Pulse 74   Temp 98.3 °F (36.8 °C)   Resp 16   Ht 5' 7\" (1.702 m)   Wt 89.8 kg (198 lb)   SpO2 92%   BMI 31.01 kg/m²        Objective:         General: No Acute distress                   HEENT: Normocephalic/atramatic                   Lungs:  Breathing non-labored                   Heart:   RRR                    Abdomen: soft       Extremities:  Prior exam done in office has been consistent with end-stage hip arthritis. We have noted pain with ROM of the left hip which manifests as anterior groin pain. There is decreased internal and external rotation of the affected hip. No significant pain with palpation over the greater trochanteric bursa. No radicular pain with straight leg raise. Patient walks with and antalgic gait. Distally patient has no neurologic deficit.       Patient Active Problem List   Diagnosis Code    Malignant neoplasm of ascending colon ([address] C18.2    Colon cancer ([address] C18.9    Osteoarthritis of left hip M16.12       Assessment:   1. Arthritis of the Left hip    Plan:   The patient has failed previous conservative treatment for this condition. The patient has pain in the left hip which causes daily symptoms which affects the patient's activities of daily living and quality of life. The risks, benefits, alternatives and possible complications of left total hip arthroplasty have been discussed with the patient including but not limited to infection, bleeding, damage to nerves and blood vessels with particular attention given to risk of damage of the femoral, obturator, lateral femoral cutaneous, superior gluteal, inferior gluteal, and sciatic nerve, risk of dislocation, fracture both intra-op and post-op, limb length inequality, polyethylene wear, implant loosening, risk for continued pain, and risk for revision surgery secondary to but not limited to all of these discussed risks. Further we discussed risk of venous thrombo-embolism including deep vein thrombosis and pulmonary embolism despite being on prophylaxis. We have also previously discussed the potential of morbidity and mortality associated with, but not limited to, the actual surgical procedure, anesthesia, prior medical conditions, and/or the administration of medications perioperatively. I have made no guarantees to the patient regarding outcomes and the patient has voiced understanding of that. The patient has been given the opportunity to ask questions all of which have been answered and the patient wishes to proceed. The patient was counseled at length about the risks of jody Covid-19 during their perioperative period and any recovery window from their procedure. The patient was made aware that jody Covid-19  may worsen their prognosis for recovering from their procedure and lend to a higher morbidity and/or mortality risk. All material risks, benefits, and reasonable alternatives including postponing the procedure were discussed.  The patient does  wish to proceed with the procedure at this time.         Signed By: Raquel Prasad MD  July 29, 2020

## 2020-07-29 NOTE — PROGRESS NOTES
Got up with therapy. Dressed and in recliner. Was up to BR with assistance. Starting to have pain, medicated with dilaudid po and toradol. Wife at bedside.

## 2020-07-29 NOTE — PROGRESS NOTES
07/29/20 1351   Oxygen Therapy   O2 Sat (%) 95 %   Pulse via Oximetry 87 beats per minute   O2 Device Room air   O2 Flow Rate (L/min) 0 l/min   Incentive Spirometry Treatment   Actual Volume (ml) 2000 ml   Number of Attempts 2   Patient achieved 2000    Ml/sec on IS. Patient encouraged to do every hour while awake-patient agreed and demonstrated. No shortness of breath or distress noted. BS are clear b/l.    [facility] notes reviewed- continuous sat  ordered HS

## 2020-07-29 NOTE — PROGRESS NOTES
Problem: Self Care Deficits Care Plan (Adult)  Goal: *Acute Goals and Plan of Care (Insert Text)  Description: GOALS:   DISCHARGE GOALS (in preparation for going home/rehab):  3 days  1. Mr. Joslyn Tomlin will perform one lower body dressing activity with minimal assistance with adaptive equipment to demonstrate improved functional mobility and safety. 2.  Mr. Joslyn Tomlin will perform one lower body bathing activity with minimal  assistance with adaptive equipment to demonstrate improved functional mobility and safety. 3.  Mr. Joslyn Tomlin will perform toileting/toilet transfer with contact guard assistance with adaptive equipment to demonstrate improved functional mobility and safety. 4.  Mr. Joslyn Tomlin will perform shower transfer with contact guard assistance with adaptive equipment to demonstrate improved functional mobility and safety. 5.  Mr. Joslyn Tomlin will state SUSANA precautions with two verbal cues to demonstrate improved functional mobility and safety. JOINT CAMP OCCUPATIONAL THERAPY SUSANA: Initial Assessment and Daily Note 7/29/2020  INPATIENT: Hospital Day: 1  [place] / [place] / Product Type: Medicare /      NAME/AGE/GENDER: Grace Sanchez is a 69 y.o. male   PRIMARY DIAGNOSIS:  Localized osteoarthrosis of left hip [M16.12]   Procedure(s) and Anesthesia Type:     * LEFT HIP ARTHROPLASTY TOTAL/ - Spinal (Left)  ICD-10: Treatment Diagnosis:    Pain in left hip (M25.552)  Stiffness of Left Hip, Not elsewhere classified (C83.736)      ASSESSMENT:     Mr. Joslyn Tomlin is s/p Left SUSANA and presents with decreased weight bearing on L LE and decreased independence with functional mobility and activities of daily living as compared to prior level of function and safety. Patient would benefit from skilled Occupational Therapy to maximize independence and safety with self-care task and functional mobility.   Pt would also benefit from education on lower body adaptive equipment and hip precautions post-surgery in preparation for going home. Patient able to don clothes at recliner with assist. Mobilized from bed to toilet to recliner using a rolling walker. Should progress well with ADL's tomorrow. This section established at most recent assessment   PROBLEM LIST (Impairments causing functional limitations):  Decreased Strength  Decreased ADL/Functional Activities  Decreased Transfer Abilities  Increased Pain  Increased Fatigue  Decreased Flexibility/Joint Mobility  Decreased Knowledge of Precautions   INTERVENTIONS PLANNED: (Benefits and precautions of occupational therapy have been discussed with the patient.)  Activities of daily living training  Adaptive equipment training  Balance training  Clothing management  Donning&doffing training  Theraputic activity     TREATMENT PLAN: Frequency/Duration: Follow patient 1-2tx to address above goals. Rehabilitation Potential For Stated Goals: Excellent     RECOMMENDED REHABILITATION/EQUIPMENT: (at time of discharge pending progress): Continue Skilled Therapy. OCCUPATIONAL PROFILE AND HISTORY:   History of Present Injury/Illness (Reason for Referral): Pt presents this date s/p (Left) SUSANA. Past Medical History/Comorbidities:   Mr. Swapna Singh  has a past medical history of Anemia, Anxiety, Arthritis, Chronic pain, Cigar smoker, GERD (gastroesophageal reflux disease), History of colon cancer, Malignant neoplasm of ascending colon ([facility] [address] PUD (peptic ulcer disease) (1979), Rectal hemorrhage, and Weakness of right upper extremity. Mr. Swapna Singh  has a past surgical history that includes hx cataract removal (Bilateral, 2017); hx heent (1988); hx cervical fusion (2013); hx lumbar fusion (1992); hx colonoscopy; and hx colectomy (Right, 07/2019).   Social History/Living Environment:   Home Environment: Private residence  Living Alone: No  Support Systems: Spouse/Significant Other/Partner  Patient Expects to be Discharged to[de-identified] Private residence  Prior Level of Function/Work/Activity:  Independent prior. Number of Personal Factors/Comorbidities that affect the Plan of Care: Brief history (0):  LOW COMPLEXITY   ASSESSMENT OF OCCUPATIONAL PERFORMANCE[de-identified]   Most Recent Physical Functioning:   Balance  Sitting: Intact  Standing: With support                    Coordination  Fine Motor Skills-Upper: Left Intact; Right Intact  Gross Motor Skills-Upper: Left Intact; Right Intact         Mental Status  Neurologic State: Alert  Orientation Level: Oriented X4  Cognition: Appropriate decision making                Basic ADLs (From Assessment) Complex ADLs (From Assessment)   Basic ADL  Feeding: Independent  Oral Facial Hygiene/Grooming: Setup  Bathing: Minimum assistance  Upper Body Dressing: Stand-by assistance  Lower Body Dressing: Moderate assistance  Toileting: Moderate assistance     Grooming/Bathing/Dressing Activities of Daily Living                       Functional Transfers  Bathroom Mobility: Minimum assistance  Toilet Transfer : Minimum assistance  Shower Transfer: Moderate assistance     Bed/Mat Mobility  Supine to Sit: Contact guard assistance  Sit to Stand: Contact guard assistance  Stand to Sit: Contact guard assistance  Bed to Chair: Contact guard assistance  Scooting: Contact guard assistance         Physical Skills Involved:  Range of Motion  Balance  Strength Cognitive Skills Affected (resulting in the inability to perform in a timely and safe manner):  WFL Psychosocial Skills Affected:  WFL   Number of elements that affect the Plan of Care: 1-3:  LOW COMPLEXITY   CLINICAL DECISION MAKIN [facility] [address] [address] AM-PAC 6 Clicks   Daily Activity Inpatient Short Form  How much help from another person does the patient currently need. .. Total A Lot A Little None   1. Putting on and taking off regular lower body clothing? [] 1   [x] 2   [] 3   [] 4   2. Bathing (including washing, rinsing, drying)? [] 1   [x] 2   [] 3   [] 4   3.   Toileting, which includes using toilet, bedpan or urinal?   [] 1   [x] 2   [] 3   [] 4   4. Putting on and taking off regular upper body clothing? [] 1   [] 2   [] 3   [x] 4   5. Taking care of personal grooming such as brushing teeth? [] 1   [] 2   [] 3   [x] 4   6. Eating meals? [] 1   [] 2   [] 3   [x] 4   © 2007, Trustees of [address] [place] [address] [address] [address], under license to [employer]. All rights reserved     Score:  Initial: 18 Most Recent: X (Date: -- )    Interpretation of Tool:  Represents activities that are increasingly more difficult (i.e. Bed mobility, Transfers, Gait). Medical Necessity:     Skilled intervention continues to be required due to Deficits noted abvoe. Reason for Services/Other Comments:  Patient continues to require skilled intervention due to   New SUSANA  . Use of outcome tool(s) and clinical judgement create a POC that gives a: MODERATE COMPLEXITY            TREATMENT:   (In addition to Assessment/Re-Assessment sessions the following treatments were rendered)     Pre-treatment Symptoms/Complaints:    Pain: Initial:   Pain Intensity 1: 0  Pain Location 1: Hip  Pain Orientation 1: Left  Post Session:  0     Self Care: (10): Procedure(s) (per grid) utilized to improve and/or restore self-care/home management as related to dressing, toileting, and grooming. Required minimal verbal and tactile cueing to facilitate activities of daily living skills. Initial evaluation 10 minues. Treatment/Session Assessment:     Response to Treatment:  Good, sitting up in recliner.     Education:  [] Home Exercises  [x] Fall Precautions  [x] Hip Precautions [] Going Home Video  [] Knee/Hip Prosthesis Review  [x] Walker Management/Safety [x] Adaptive Equipment as Needed       Interdisciplinary Collaboration:   Physical Therapist  Occupational Therapist  Registered Nurse    After treatment position/precautions:   Up in chair  Bed/Chair-wheels locked  Caregiver at bedside  Call light within reach  RN notified     Compliance with Program/Exercises: Compliant all of the time, Will assess as treatment progresses. Recommendations/Intent for next treatment session:  Treatment next visit will focus on increasing Mr. Harrison's independence with bed mobility, transfers, self care, functional mobility, modalities for pain, and patient education.       Total Treatment Duration:  OT Patient Time In/Time Out  Time In: 1240  Time Out: 0830 [place], [place]

## 2020-07-29 NOTE — PROGRESS NOTES
TRANSFER - IN REPORT:    Verbal report received from Gordon Beltran RN on Jersey Shoemaker  being received from PACU for routine post - op      Report consisted of patients Situation, Background, Assessment and   Recommendations(SBAR). Information from the following report(s) SBAR was reviewed with the receiving nurse. Opportunity for questions and clarification was provided. Assessment completed upon patients arrival to unit and care assumed. Oriented to room, bed controls, and how to order meals. wife in room. ABDs and tape dry and intact to L hip. SCDs and yellow gripper socks to LEs and instructed not to get up without staff to assist. Has IS.

## 2020-07-29 NOTE — ANESTHESIA POSTPROCEDURE EVALUATION
Procedure(s):  LEFT HIP ARTHROPLASTY TOTAL/.    spinal    Anesthesia Post Evaluation        Patient location during evaluation: PACU  Patient participation: complete - patient participated  Level of consciousness: responsive to verbal stimuli  Pain management: adequate  Airway patency: patent  Anesthetic complications: no  Cardiovascular status: acceptable  Respiratory status: acceptable  Hydration status: euvolemic        INITIAL Post-op Vital signs:   Vitals Value Taken Time   /66 7/29/2020 10:46 AM   Temp 36.8 °C (98.2 °F) 7/29/2020 10:41 AM   Pulse 77 7/29/2020 10:46 AM   Resp 16 7/29/2020 10:46 AM   SpO2 96 % 7/29/2020 10:46 AM

## 2020-07-29 NOTE — PERIOP NOTES
TRANSFER - OUT REPORT:    Verbal report given to [facility] MARIA DE JESUS PHILLIPS on Lakshmi Lam  being transferred to [address] [place] [address] for routine post - op       Report consisted of patients Situation, Background, Assessment and   Recommendations(SBAR). Information from the following report(s) SBAR, OR Summary, Procedure Summary, Intake/Output and MAR was reviewed with the receiving nurse. Lines:   Peripheral IV 66/91/61 Left Cephalic (Active)   Site Assessment Clean, dry, & intact 07/29/20 1016   Phlebitis Assessment 0 07/29/20 1016   Infiltration Assessment 0 07/29/20 1016   Dressing Status Clean, dry, & intact 07/29/20 1016   Dressing Type Tape;Transparent 07/29/20 1016   Hub Color/Line Status Green; Infusing;Flushed;Patent 07/29/20 1016        Opportunity for questions and clarification was provided. Patient transported with:   O2 @ 2 liters    VTE prophylaxis orders have been written for Lakshmi Lam. Patient and family given floor number and nurses name. Family updated re: pt status after security code verified.

## 2020-07-29 NOTE — PROGRESS NOTES
Problem: Mobility Impaired (Adult and Pediatric)  Goal: *Acute Goals and Plan of Care (Insert Text)  Description: GOALS (1-4 days):  (1.)Mr. Julia Horan will move from supine to sit and sit to supine  in bed with INDEPENDENT. (2.)Mr. Julia Horan will transfer from bed to chair and chair to bed with SUPERVISION using the least restrictive device. (3.)Mr. Julia Horan will ambulate with SUPERVISION for 200 feet with the least restrictive device. (4.)Mr. Julia Horan will ambulate up/down 1 steps with No railing with STAND BY ASSIST with walker. (5.)Mr. Julia Horan will state/observe SUSANA precautions with No verbal cues. ________________________________________________________________________________________________   Outcome: Progressing Towards Goal     PHYSICAL THERAPY JOINT CAMP SUSANA: Initial Assessment, Treatment Day: Day of Assessment, and PM 7/29/2020  INPATIENT: Hospital Day: 1  [place] / [place] / Product Type: Medicare /      NAME/AGE/GENDER: Kendall Coleman is a 69 y.o. male   PRIMARY DIAGNOSIS:  Localized osteoarthrosis of left hip [M16.12]   Procedure(s) and Anesthesia Type:     * LEFT HIP ARTHROPLASTY TOTAL/ - Spinal (Left)  ICD-10: Treatment Diagnosis:    Pain in left hip (M25.552)  Stiffness of Left Hip, Not elsewhere classified (M25.652)  Difficulty in walking, Not elsewhere classified (R26.2)      ASSESSMENT:     Mr. Julia Horan presents with impaired strength & mobility s/p left SUSANA. Pt also had decreased stability with out of bed activity. This pt will benefit from follow up therapy to help restore safe function prior to returning home with caregiver.     This section established at most recent assessment   PROBLEM LIST (Impairments causing functional limitations):  Decreased Strength  Decreased ADL/Functional Activities  Decreased Transfer Abilities  Decreased Ambulation Ability/Technique  Decreased Balance  Increased Pain  Decreased Activity Tolerance  Decreased Knowledge of Precautions  Decreased Gordon with Home Exercise Program   INTERVENTIONS PLANNED: (Benefits and precautions of physical therapy have been discussed with the patient.)  bed mobility  gait training  home exercise program (HEP)  Range of Motion: active/assisted/passive  Therapeutic Activities  therapeutic exercise/strengthening  transfer training  Group Therapy     TREATMENT PLAN: Frequency/Duration: Follow patient BID for duration of hospital stay to address above goals. Rehabilitation Potential For Stated Goals: Good     RECOMMENDED REHABILITATION/EQUIPMENT: (at time of discharge pending progress): Continue Skilled Therapy and Home Health: Physical Therapy. HISTORY:   History of Present Injury/Illness (Reason for Referral):  Left SUSANA  Past Medical History/Comorbidities:   Mr. Oyl Mao  has a past medical history of Anemia, Anxiety, Arthritis, Chronic pain, Cigar smoker, GERD (gastroesophageal reflux disease), History of colon cancer, Malignant neoplasm of ascending colon ([address] PUD (peptic ulcer disease) (1979), Rectal hemorrhage, and Weakness of right upper extremity. Mr. Oly Mao  has a past surgical history that includes hx cataract removal (Bilateral, 2017); hx heent (1988); hx cervical fusion (2013); hx lumbar fusion (1992); hx colonoscopy; and hx colectomy (Right, 07/2019).   Social History/Living Environment:   Home Environment: Private residence  # Steps to Enter: 0  One/Two Story Residence: One story(sunken den, 1 step)  Living Alone: No  Support Systems: Spouse/Significant Other/Partner  Patient Expects to be Discharged to[de-identified] Private residence  Current DME Used/Available at Home: None  Prior Level of Function/Work/Activity:  Pt was independent without an assistive device prior to this admission   Number of Personal Factors/Comorbidities that affect the Plan of Care: 3+: HIGH COMPLEXITY   EXAMINATION:   Most Recent Physical Functioning:   Gross Assessment: Yes  Gross Assessment  AROM: Within functional limits(right LE)  Strength: Within functional limits(right LE)  Coordination: Within functional limits(right LE)                     Bed Mobility  Supine to Sit: Contact guard assistance  Sit to Supine: Contact guard assistance  Scooting: Contact guard assistance    Transfers  Sit to Stand: Contact guard assistance  Stand to Sit: Contact guard assistance  Bed to Chair: Contact guard assistance(with walker)    Balance  Sitting: Intact; Without support  Standing: Impaired; With support(walker)              Weight Bearing Status  Left Side Weight Bearing: As tolerated  Distance (ft): 100 Feet (ft)  Ambulation - Level of Assistance: Contact guard assistance  Assistive Device: Walker, rolling  Speed/She: Delayed  Step Length: Left shortened;Right shortened  Stance: Left decreased  Gait Abnormalities: Antalgic;Decreased step clearance        Braces/Orthotics: none           Body Structures Involved:  Joints  Muscles Body Functions Affected:  Sensory/Pain  Movement Related Activities and Participation Affected:  General Tasks and Demands  Mobility   Number of elements that affect the Plan of Care: 4+: HIGH COMPLEXITY   CLINICAL PRESENTATION:   Presentation: Stable and uncomplicated: LOW COMPLEXITY   CLINICAL DECISION MAKIN [facility] [address] [address] AM-PAC 6 Clicks   Basic Mobility Inpatient Short Form  How much difficulty does the patient currently have. .. Unable A Lot A Little None   1. Turning over in bed (including adjusting bedclothes, sheets and blankets)? [] 1   [] 2   [x] 3   [] 4   2. Sitting down on and standing up from a chair with arms ( e.g., wheelchair, bedside commode, etc.)   [] 1   [] 2   [x] 3   [] 4   3. Moving from lying on back to sitting on the side of the bed? [] 1   [] 2   [x] 3   [] 4   How much help from another person does the patient currently need. .. Total A Lot A Little None   4. Moving to and from a bed to a chair (including a wheelchair)? [] 1   [] 2   [x] 3   [] 4   5. Need to walk in hospital room? [] 1   [] 2   [x] 3   [] 4   6. Climbing 3-5 steps with a railing? [x] 1   [] 2   [] 3   [] 4   © 2007, Trustees of [facility] [employer], under license to [employer]. All rights reserved     Score:  Initial: 16 Most Recent: X (Date: -- )    Interpretation of Tool:  Represents activities that are increasingly more difficult (i.e. Bed mobility, Transfers, Gait). Medical Necessity:     Patient is expected to demonstrate progress in   strength, balance, coordination, and functional technique   to   decrease assistance required with bed mobility, transfers & gait  . Reason for Services/Other Comments:  Patient continues to require skilled intervention due to   Pt not independent with functional mobility  . Use of outcome tool(s) and clinical judgement create a POC that gives a: Clear prediction of patient's progress: LOW COMPLEXITY            TREATMENT:   (In addition to Assessment/Re-Assessment sessions the following treatments were rendered)     Pre-treatment Symptoms/Complaints:  none  Pain Initial: numeric scale  Pain Intensity 1: 0  Pain Location 1: Hip  Pain Orientation 1: Left  Pain Intervention(s) 1: Ambulation/Increased Activity  Post Session:  0/10     Therapeutic Exercise: (12 Minutes):  Exercises per grid below to improve mobility and dynamic movement of leg - left to improve functional endurance . Required minimal verbal cues to promote proper body alignment and promote proper body mechanics.     Assessment/ 11 min     Date:  7/29 Date:   Date:     ACTIVITY/EXERCISE AM PM AM PM AM PM   GROUP THERAPY  []  []  []  []  []  []   Ankle Pumps  10       Quad Sets  10       Gluteal Sets  10       Hip ABd/ADduction  10       Straight Leg Raises  --       Knee Slides  10       Short Arc Quads  10       Long Arc Quads  10       Chair Slides  --                B = bilateral; AA = active assistive; A = active; P = passive      Treatment/Session Assessment:     Response to Treatment:  tolerated well    Education:  [x] Home Exercises  [x] Fall Precautions  [x] Hip Precautions [] D/C Instruction Review  [] Hip Prosthesis Review  [x] Walker Management/Safety [] Adaptive Equipment as Needed       Interdisciplinary Collaboration:   Registered Nurse    After treatment position/precautions:   Up in chair  Bed/Chair-wheels locked  Caregiver at bedside  Call light within reach  RN notified  Family at bedside    Compliance with Program/Exercises: Will assess as treatment progresses. Recommendations/Intent for next treatment session:  Treatment next visit will focus on increasing Mr. Nogueras independence with bed mobility, transfers, gait training, strength/ROM exercises, modalities for pain, and patient education.       Total Treatment Duration:  PT Patient Time In/Time Out  Time In: 1323  Time Out: [address] Maru Ball, PT

## 2020-07-29 NOTE — ANESTHESIA PROCEDURE NOTES
Spinal Block    Start time: 7/29/2020 8:27 AM  End time: 7/29/2020 8:36 AM  Performed by: Dilcia Stein MD  Authorized by: Dilcia Stein MD     Pre-procedure:   Indications: primary anesthetic  Preanesthetic Checklist: patient identified, risks and benefits discussed, anesthesia consent, site marked, patient being monitored and timeout performed    Timeout Time: 08:26          Spinal Block:   Patient Position:  Seated  Prep Region:  Lumbar  Prep: chlorhexidine and patient draped      Location:  L2-3  Technique:  Single shot        Needle:   Needle Type:  Quincke  Needle Gauge:  22 G  Attempts:  3      Events: CSF confirmed, no blood with aspiration and no paresthesia        Assessment:  Insertion:  Uncomplicated  Patient tolerance:  Patient tolerated the procedure well with no immediate complications

## 2020-07-29 NOTE — OP NOTES
Total Hip Procedure Note    Holly Kimble,  [id number],  1950    Pre-operative Diagnosis: Localized osteoarthrosis of left hip [M16.12]        Post-operative Diagnosis: Same    Procedure: Left Total Hip Arthroplasty    BMI: Body mass index is 31.01 kg/m². Christiano Toney Location: [address] [place] [address]    Surgeon: Yee Carias MD    Assistant: Whitney Richard CFA    Anesthesia: Spinal     Complications: None    EBL: 200cc    Drains: None    Intra-op Findings: Pre-operatively leg lengths were assessed using preop Xrays and with the patient in the lateral decubitus position and operative leg was felt to be similar in length compared to the contralateral leg. The operative hip showed notable cartilage loss of both the femoral head and acetabulum. No intra-operative periprosthetic fracture was encountered. Sciatic nerve was noted to be intact at the end of the procedure. We measured the distance of our resected femoral head/neck from the cut surface to the center of the femoral head to be approximately 35mm. The overall length replaced with the implanted head/neck was 35mm. Intra-operatively we felt that we restored the patients leg lengths to equal lengths using the method described later. Postop with the patient supine we assessed leg lengths and felt they were similar. Patient condition at completion of Procedure: Stable    Implants:   Implant Name Type Inv.  Item Serial No.  Lot No. LRB No. Used Action   SHELL ACET CLUS H 56F TRTANIUM -- TRIDENT II - [id number]  SHELL ACET CLUS H 56F TRTANIUM -- TRIDENT II [id number] [employer] HOW [id number] Left 1 Implanted   INSERT ACET 0DEG 36MM F X3 -- TRIDENT - [id number]  INSERT ACET 0DEG 36MM F X3 -- TRIDENT [id number] [employer] HOW [id number] Left 1 Implanted   STEM FEM SZ 4 [id number] -- ACCOLADE II V40 - [id number]  STEM FEM SZ 4 127D [id number] -- ACCOLADE II V40 [id number] [employer] HOW [id number] Left 1 Implanted   HEAD FEM DELT V40 +0MM NK 36MM -- V40 BIOLOX - [id number]  HEAD FEM DELT V40 +0MM NK 36MM -- V40 BIOLOX [id number] [employer] HOWM [id number] Left 1 Implanted       Description of Procedure    The complexity of the total joint surgery requires the use of a first assistant for positioning, retraction and assistance in closure. Andrey Yoder was brought to the operating room. Patient was transferred from the stretcher to OR bed. Spinal anesthetic was induced. IV antibiotics were administered per protocol. Andrey Yoder was positioned in the lateral decubitus position and the pelvis/torso stabilized with posts. The limb was prepped and draped in the usual sterile fashion. A time out identifying the patient, procedure, operative side and surgeon was administered and charted by the OR Nurse. Prior to incision one gram of TXA was given intravenously. A standard posterior approach was utilized to expose the hip. The incision was carried through the subcutaneous tissue and underlying fascia with hemostasis obtained using the bovie cauterization and Aquamantys. A Charmley retractor was inserted. We resected any redundent bursal tissue off the external rotators. We were able to identify the piriformis tendon. The short external rotators and capsule were dissected off the posterior femur as a single layer just superior to the piriformis tendon. The sciatic nerve was palpated and protected during dissection. The femoral head was dislocated. The articular surface revealed loss of cartilage, exposed bone and bone spurring. The neck was osteotomized approximately 1cm above the top of the lesser trochanter. We were careful to protect the greater trochanter during osteotomy and protect it from iatrogenic injury. Acetabular retractors were placed both anterior and posterior just superficial to the acetabular labrum.   Remaining acetabular labrum was resected and any soft tissue was removed from the acetabulum including any tissue within the codyloid fossa. The acetabular surface revealed loss of cartilage with exposed bone. The acetabulum was sequentially reamed noting proper anteversion and inclination during reaming. The transverse acetabular ligament was used as the primary anatomic landmark to determine version and inclination. The acetabulum was sized to a 56 mm acetabular component. The prepared acetabulum was irrigated of any residual reamings and soft tissue. The permanent acetabular component was impacted into place to achieve appropriate horizontal tilt, anteversion, bone coverage and stability. We visualize that the acetabular component was fully seated. A trial liner was impacted into position. We did not have to excise overhanging osteophytes anterior and posterior  in order to minimize the risk of impingement. We then turned our attention to the proximal femur. A 2-prong proximal femoral retractor was placed. We gained access to the proximal femur using a  and femoral canal finder. The canal was prepared with appropriate lateralization in which we used the initial smaller broaches to remove lateral bone to avoid varus placement of the femoral component. The canal was then broached progressively. The broach was positioned with the appropriate anatomic femoral anteversion. We broached up to a size 4 Accolade II stem. A calcar planar was used. A trial reduction with a size #4 127 degree Accolade II stem with a +0 neck length and 36 mm head was performed. This was found to be the most stable to flexion greater than 90 degrees and on internal and external rotation. Limb lengths were assessed using three techniques. First, the position of the tip of the operative greater trochanter was compared to the center of the femoral head component and was felt to match this same anatomic relationship as the non-operative hip based on preoperative X-rays.  Next, we measured the length of our resected femoral head neck and felt that the trial components matched this resected length as closely as possible when accounting for the length provided by the femoral neck/head. Finally, we compared leg lengths by comparing the possible of the patella with the patients heels together with the patient in the lateral decubitus position. Using these methods it was felt that the patients leg lengths were equilibrated and with appropriate stability as mentioned above. All trial components were removed. The final liner was impacted into place which was a neutral liner. A cementless size 3 127 degree Accolade II stem was impacted into place carefully. We were careful to observe the calcar region for any iatrogenic fractures during insertion. The permanent femoral head was impacted into place which was a +0mm 36mm ceramic head. Felipe Harrison's hip was reduced and stability was as mentioned above. Dilute Betadine solution was allowed to sit in the surgical site for a 3 minute period and copious saline was used to irrigate the surgical site. The sciatic nerve was palpated and noted to be intact. A periarticular of ropivicaine, toradol, and morphine was injected about the surgical site with care being taken not to inject in the vicinity of neurovascular structures. Prior to wound closure one additional gram of TXA was given for a total of 2 grams. The capsule was repaired with a three #2 Fiberwires secured through drill holes placed in the posterior aspect of the trochanter. No drain was placed. The fascia was closed with a  #0 Bidirectional Stratafix barbed suture. The sub-Q layer was closed with 2-0 monocril suture and a  3-0 moncril stratafix suture in a running subcuticular fashion was used to close the skin. The incision site was thoroughly cleaned with alcohol and the Exofin wound closure system was applied to seal it off from external contamination after the overlying skin was thoroughly cleaned with alcohol.   A thin layer of [place] lubricant was applied over the wound to keep the dressing from adhering to the overlying sterile bandage and said bandage was placed. Drapes were then broken down and patient was transferred carefully back to the OR stretcher. The sponge and needle counts were correct. The patient tolerated the procedure without difficulty and left the operating room in satisfactory condition.     Signed By: Landon Curiel MD

## 2020-07-29 NOTE — PERIOP NOTES
TRANSFER - OUT REPORT:    Verbal report given to [address] [place] [address] [facility]  being transferred to preop holding for routine progression of care       Report consisted of patients Situation, Background, Assessment and   Recommendations(SBAR). Information from the following report(s) SBAR, MAR and Recent Results was reviewed with the receiving nurse. Lines:   Peripheral IV 58/02/02 Left Cephalic (Active)   Site Assessment Clean, dry, & intact 07/29/20 0639   Phlebitis Assessment 0 07/29/20 0639   Infiltration Assessment 0 07/29/20 0639   Dressing Status Clean, dry, & intact 07/29/20 0639   Dressing Type Tape;Transparent 07/29/20 1234   Hub Color/Line Status Green; Infusing 07/29/20 7683        Opportunity for questions and clarification was provided.       Patient transported with:   [place] [facility]

## 2020-07-30 VITALS
DIASTOLIC BLOOD PRESSURE: 73 MMHG | HEART RATE: 80 BPM | OXYGEN SATURATION: 93 % | TEMPERATURE: 97.6 F | BODY MASS INDEX: 31.08 KG/M2 | SYSTOLIC BLOOD PRESSURE: 137 MMHG | WEIGHT: 198 LBS | HEIGHT: 67 IN | RESPIRATION RATE: 16 BRPM

## 2020-07-30 LAB — HGB BLD-MCNC: 10.9 G/DL (ref 13.6–17.2)

## 2020-07-30 PROCEDURE — 97535 SELF CARE MNGMENT TRAINING: CPT

## 2020-07-30 PROCEDURE — 74011000250 HC RX REV CODE- 250: Performed by: NURSE PRACTITIONER

## 2020-07-30 PROCEDURE — 74011250636 HC RX REV CODE- 250/636: Performed by: NURSE PRACTITIONER

## 2020-07-30 PROCEDURE — 74011250637 HC RX REV CODE- 250/637: Performed by: NURSE PRACTITIONER

## 2020-07-30 PROCEDURE — 85018 HEMOGLOBIN: CPT

## 2020-07-30 PROCEDURE — 36415 COLL VENOUS BLD VENIPUNCTURE: CPT

## 2020-07-30 PROCEDURE — 97110 THERAPEUTIC EXERCISES: CPT

## 2020-07-30 PROCEDURE — 97116 GAIT TRAINING THERAPY: CPT

## 2020-07-30 RX ADMIN — CYANOCOBALAMIN TAB 1000 MCG 1000 MCG: 1000 TAB at 08:41

## 2020-07-30 RX ADMIN — PANTOPRAZOLE SODIUM 40 MG: 40 TABLET, DELAYED RELEASE ORAL at 08:41

## 2020-07-30 RX ADMIN — ACETAMINOPHEN 1000 MG: 500 TABLET, FILM COATED ORAL at 00:46

## 2020-07-30 RX ADMIN — ACETAMINOPHEN 1000 MG: 500 TABLET, FILM COATED ORAL at 05:21

## 2020-07-30 RX ADMIN — KETOROLAC TROMETHAMINE 15 MG: 15 INJECTION, SOLUTION INTRAMUSCULAR; INTRAVENOUS at 05:21

## 2020-07-30 RX ADMIN — ASPIRIN 81 MG: 81 TABLET, COATED ORAL at 08:41

## 2020-07-30 RX ADMIN — Medication 1 AMPULE: at 08:39

## 2020-07-30 RX ADMIN — GABAPENTIN 100 MG: 100 CAPSULE ORAL at 08:41

## 2020-07-30 RX ADMIN — Medication 5 ML: at 00:46

## 2020-07-30 RX ADMIN — DOCUSATE SODIUM 50MG AND SENNOSIDES 8.6MG 2 TABLET: 8.6; 5 TABLET, FILM COATED ORAL at 08:41

## 2020-07-30 RX ADMIN — MULTIPLE VITAMINS W/ MINERALS TAB 1 TABLET: TAB at 08:41

## 2020-07-30 RX ADMIN — CEFAZOLIN SODIUM 2 G: 100 INJECTION, POWDER, LYOPHILIZED, FOR SOLUTION INTRAVENOUS at 00:46

## 2020-07-30 RX ADMIN — Medication 5 ML: at 05:21

## 2020-07-30 NOTE — PROGRESS NOTES
Had therapy. Given prescriptions and instructed how to take. Has follow up appts already scheduled with dr Ulises Chavez. Home health to see pt for therapy. Instructed to call doctor if having fever, excessive drainage, numbness or other problems. I have reviewed discharge instructions with the patient. The patient verbalized understanding. Taken to car via wheelchair.

## 2020-07-30 NOTE — PROGRESS NOTES
2020         Post Op day: 1 Day Post-Op     Admit Date: 2020  Admit Diagnosis: Localized osteoarthrosis of left hip [M16.12]  Osteoarthritis of left hip [M16.12]        Subjective: Patient stable. No acute events. Objective:   Visit Vitals  /73 (BP 1 Location: Right arm, BP Patient Position: At rest)   Pulse 80   Temp 97.6 °F (36.4 °C)   Resp 16   Ht 5' 7\" (1.702 m)   Wt 89.8 kg (198 lb)   SpO2 93%   BMI 31.01 kg/m²    Temp (24hrs), Av.5 °F (36.9 °C), Min:97.6 °F (36.4 °C), Max:99.9 °F (37.7 °C)    Lab Results   Component Value Date/Time    HGB 10.9 (L) 2020 04:13 AM     Extremity Exam  Dressing clean and dry   Tibialis Anterior and Gastroc-Soleus functioning normally left lower extremity  Sensation intact to light touch on operative limb  Extremity perfused  TEDS/SCDS in place  No sign of DVT     Assessment / Plan :  WBAT LLE  Continue PT/OT  Continue current DVT prophylaxis in house.  Discharge on ASA BID  DIspo-HH  Patient Active Problem List   Diagnosis Code    Malignant neoplasm of ascending colon ([address] C18.2    Colon cancer ([address] C18.9    Osteoarthritis of left hip M16.12          Signed By: Lida Borges NP

## 2020-07-30 NOTE — PROGRESS NOTES
Problem: Diabetes Self-Management  Goal: *Disease process and treatment process  Description: Define diabetes and identify own type of diabetes; list 3 options for treating diabetes. Outcome: Progressing Towards Goal  Goal: *Incorporating nutritional management into lifestyle  Description: Describe effect of type, amount and timing of food on blood glucose; list 3 methods for planning meals. Outcome: Progressing Towards Goal  Goal: *Incorporating physical activity into lifestyle  Description: State effect of exercise on blood glucose levels. Outcome: Progressing Towards Goal  Goal: *Developing strategies to promote health/change behavior  Description: Define the ABC's of diabetes; identify appropriate screenings, schedule and personal plan for screenings. Outcome: Progressing Towards Goal  Goal: *Using medications safely  Description: State effect of diabetes medications on diabetes; name diabetes medication taking, action and side effects. Outcome: Progressing Towards Goal  Goal: *Monitoring blood glucose, interpreting and using results  Description: Identify recommended blood glucose targets  and personal targets. Outcome: Progressing Towards Goal  Goal: *Prevention, detection, treatment of acute complications  Description: List symptoms of hyper- and hypoglycemia; describe how to treat low blood sugar and actions for lowering  high blood glucose level. Outcome: Progressing Towards Goal  Goal: *Prevention, detection and treatment of chronic complications  Description: Define the natural course of diabetes and describe the relationship of blood glucose levels to long term complications of diabetes.   Outcome: Progressing Towards Goal  Goal: *Developing strategies to address psychosocial issues  Description: Describe feelings about living with diabetes; identify support needed and support network  Outcome: Progressing Towards Goal  Goal: *Insulin pump training  Outcome: Progressing Towards Goal  Goal: *Sick day guidelines  Outcome: Progressing Towards Goal  Goal: *Patient Specific Goal (EDIT GOAL, INSERT TEXT)  Outcome: Progressing Towards Goal     Problem: Patient Education: Go to Patient Education Activity  Goal: Patient/Family Education  Outcome: Progressing Towards Goal     Problem: Falls - Risk of  Goal: *Absence of Falls  Description: Document Marissa Lawson Fall Risk and appropriate interventions in the flowsheet.   Outcome: Progressing Towards Goal  Note: Fall Risk Interventions:  Mobility Interventions: OT consult for ADLs, PT Consult for assist device competence         Medication Interventions: Patient to call before getting OOB    Elimination Interventions: Call light in reach, Patient to call for help with toileting needs              Problem: Patient Education: Go to Patient Education Activity  Goal: Patient/Family Education  Outcome: Progressing Towards Goal     Problem: Patient Education: Go to Patient Education Activity  Goal: Patient/Family Education  Outcome: Progressing Towards Goal     Problem: Hip Replacement: Day of Surgery/Unit  Goal: Off Pathway (Use only if patient is Off Pathway)  Outcome: Progressing Towards Goal  Goal: Activity/Safety  Outcome: Progressing Towards Goal  Goal: Consults, if ordered  Outcome: Progressing Towards Goal  Goal: Diagnostic Test/Procedures  Outcome: Progressing Towards Goal  Goal: Nutrition/Diet  Outcome: Progressing Towards Goal  Goal: Medications  Outcome: Progressing Towards Goal  Goal: Respiratory  Outcome: Progressing Towards Goal  Goal: Treatments/Interventions/Procedures  Outcome: Progressing Towards Goal  Goal: Psychosocial  Outcome: Progressing Towards Goal  Goal: *Initiate mobility  Outcome: Progressing Towards Goal  Goal: *Optimal pain control at patient's stated goal  Outcome: Progressing Towards Goal  Goal: *Hemodynamically stable  Outcome: Progressing Towards Goal     Problem: Hip Replacement: Post Op Day 1  Goal: Off Pathway (Use only if patient is Off Pathway)  Outcome: Progressing Towards Goal  Goal: Activity/Safety  Outcome: Progressing Towards Goal  Goal: Diagnostic Test/Procedures  Outcome: Progressing Towards Goal  Goal: Nutrition/Diet  Outcome: Progressing Towards Goal  Goal: Medications  Outcome: Progressing Towards Goal  Goal: Respiratory  Outcome: Progressing Towards Goal  Goal: Treatments/Interventions/Procedures  Outcome: Progressing Towards Goal  Goal: Psychosocial  Outcome: Progressing Towards Goal  Goal: Discharge Planning  Outcome: Progressing Towards Goal  Goal: *Demonstrates progressive activity  Outcome: Progressing Towards Goal  Goal: *Optimal pain control at patient's stated goal  Outcome: Progressing Towards Goal  Goal: *Hemodynamically stable  Outcome: Progressing Towards Goal  Goal: *Discharge plan identified  Outcome: Progressing Towards Goal     Problem: Patient Education: Go to Patient Education Activity  Goal: Patient/Family Education  Description: Pt/caregiver will demonstrate and verbalize good understanding of OT recommendations regarding ADL status, functional transfer status, home safety, DME, AE, energy conservation techniques, follow-up therapy, for increasing safety with functional tasks upon discharge.     Outcome: Progressing Towards Goal     Problem: Patient Education: Go to Patient Education Activity  Goal: Patient/Family Education  Outcome: Progressing Towards Goal

## 2020-07-30 NOTE — PROGRESS NOTES
Shift assessment completed via flow sheet. No signs of distress or c/o pain at this time. Pt a/o x4. Safety measures in place. Instructed pt to call for assistance. Will continue to monitor.

## 2020-07-30 NOTE — PROGRESS NOTES
Care Management Interventions  PCP Verified by CM: Yes  Mode of Transport at Discharge: Self  Transition of Care Consult (CM Consult): [address]: Yes  Discharge Durable Medical Equipment: Yes  Physical Therapy Consult: Yes  Occupational Therapy Consult: Yes  Current Support Network: Lives with Spouse  Confirm Follow Up Transport: Family  The Plan for Transition of Care is Related to the Following Treatment Goals : return to independent function  The Patient and/or Patient Representative was Provided with a Choice of Provider and Agrees with the Discharge Plan?: Yes  Freedom of Choice List was Provided with Basic Dialogue that Supports the Patient's Individualized Plan of Care/Goals, Treatment Preferences and Shares the Quality Data Associated with the Providers?: Yes  Discharge Location  Discharge Placement: Home with home health    Patient is a 69y.o. year old male admitted for Left SUSANA . Patient plans to return home on discharge. Order received to arrange home health. Patient without preference towards agency. Referral sent to [facility]. Patient requesting we arrange a walker and bedside commode. Referral sent to [address] [place] [address]. delivered to the hospital room prior to discharge. Will follow until discharge.

## 2020-07-30 NOTE — DISCHARGE INSTRUCTIONS
[id number] [facility]   Patient Discharge Instructions    Kourtney Esparza / [id number] : 1950    Admitted 2020 Discharged: 2020     IF YOU HAVE ANY PROBLEMS ONCE YOU ARE AT HOME CALL THE FOLLOWING NUMBERS:   Main office number: [phone]    Take Home Medications     · It is important that you take the medication exactly as they are prescribed. · Keep your medication in the bottles provided by the pharmacist and keep a list of the medication names, dosages, and times to be taken in your wallet. · Do not take other medications without consulting your doctor. What to do at [address] your prehospital diet. If you have excessive nausea or vomitting call your doctor's office     Home Physical Therapy is arranged. Use rolling walker when walking. Patients who have had a joint replacement should not drive until you are seen for your follow up appointment by Dr. Jakub Saez. When to Call    - Call if you have a temperature greater then 101  - Unable to keep food down  - Loose control of your bladder or bowel function  - Are unable to bear any weight   - Need a pain medication refill       DISCHARGE SUMMARY from Nurse    The following personal items collected during your admission are returned to you:   Dental Appliance: Dental Appliances: None  Vision: Visual Aid: None  Hearing Aid:    Jewelry: Jewelry: None  Clothing: Clothing: At bedside  Other Valuables: Other Valuables: Cell Phone, Wallet(with Cate)  Valuables sent to safe:      PATIENT INSTRUCTIONS:    After general anesthesia or intravenous sedation, for 24 hours or while taking prescription Narcotics:  · Limit your activities  · Do not drive and operate hazardous machinery  · Do not make important personal or business decisions  · Do  not drink alcoholic beverages  · If you have not urinated within 8 hours after discharge, please contact your surgeon on call.     Report the following to your surgeon:  · Excessive pain, swelling, redness or odor of or around the surgical area  · Temperature over 101  · Nausea and vomiting lasting longer than 4 hours or if unable to take medications  · Any signs of decreased circulation or nerve impairment to extremity: change in color, persistent  numbness, tingling, coldness or increase pain  · Any questions, call office @ [phone]      Keep scheduled follow up appointment. If need to change, call office @ [phone]. *  Please give a list of your current medications to your Primary Care Provider. *  Please update this list whenever your medications are discontinued, doses are      changed, or new medications (including over-the-counter products) are added. *  Please carry medication information at all times in case of emergency situations. Patient Education        Hip Replacement Surgery (Posterior): What to Expect at Home  Your Recovery  Hip replacement surgery replaces the worn parts of your hip joint. When you leave the hospital, you will probably be walking with crutches or a walker. You may be able to climb a few stairs and get in and out of bed and chairs. But you will need someone to help you at home for the next few weeks or until you have more energy and can move around better. If you need more extensive rehab, you may go to a specialized rehab center for more treatment. You will go home with a bandage and stitches, staples, tissue glue, or tape strips. You can remove the bandage when your doctor tells you to. If you have stitches or staples, your doctor will remove them 10 days to 3 weeks after your surgery. Glue or tape strips will fall off on their own over time. You may still have some mild pain, and the area may be swollen for 3 to 4 months after surgery. Your doctor will give you medicine for the pain.   You will continue the rehabilitation program (rehab) you started in the hospital. The better you do with your rehab exercises, the sooner you will get your strength and movement back. Most people are able to return to work 4 weeks to 4 months after surgery. This care sheet gives you a general idea about how long it will take for you to recover. But each person recovers at a different pace. Follow the steps below to get better as quickly as possible. How can you care for yourself at home? Activity  · Your doctor may not want your affected leg to cross the center of your body toward the other leg. If so, your therapist may suggest these ideas:  ? Do not cross your legs. ? Be very careful as you get in or out of bed or a car, so your leg does not cross that imaginary line in the middle of your body. · Rest when you feel tired. You may take a nap, but do not stay in bed all day. · Work with your physical therapist to learn the best way to exercise. You will probably have to use crutches or a walker for at least 4 to 6 weeks. · Your doctor may advise you to stay away from activities that put stress on the joint. This includes sports such as tennis, football, and jogging. · Try not to sit for too long at one time. You will feel less stiff if you take a short walk about every hour. When you sit, use chairs with arms, and do not sit in low chairs. · Do not bend over more than 90 degrees (like the angle in a letter \"L\"). · Sleep on your back with your legs slightly apart or on your side with a pillow between your knees for about 6 weeks or as your doctor tells you. Do not sleep on your stomach or affected leg. · Ask your doctor when you can drive again. · Most people are able to return to work 4 weeks to 4 months after surgery. · Ask your doctor when it is okay for you to have sex. Diet  · By the time you leave the hospital, you will probably be eating your normal diet. If your stomach is upset, try bland, low-fat foods like plain rice, broiled chicken, toast, and yogurt. Your doctor may recommend that you take iron and vitamin supplements.   · Drink plenty of fluids (unless your doctor tells you not to). · Eat healthy foods, and watch your portion sizes. Try to stay at your ideal weight. Too much weight puts more stress on your new hip joint. · You may notice that your bowel movements are not regular right after your surgery. This is common. Try to avoid constipation and straining with bowel movements. You may want to take a fiber supplement every day. If you have not had a bowel movement after a couple of days, ask your doctor about taking a mild laxative. Medicines  · Your doctor will tell you if and when you can restart your medicines. He or she will also give you instructions about taking any new medicines. · If you take aspirin or some other blood thinner, ask your doctor if and when to start taking it again. Make sure that you understand exactly what your doctor wants you to do. · Your doctor may give you a blood-thinning medicine to prevent blood clots. If you take a blood thinner, be sure you get instructions about how to take your medicine safely. Blood thinners can cause serious bleeding problems. This medicine could be in pill form or as a shot (injection). If a shot is necessary, your doctor will tell you how to do this. · Be safe with medicines. Take pain medicines exactly as directed. ? If the doctor gave you a prescription medicine for pain, take it as prescribed. ? If you are not taking a prescription pain medicine, ask your doctor if you can take an over-the-counter medicine. · If you think your pain medicine is making you sick to your stomach:  ? Take your medicine after meals (unless your doctor has told you not to). ? Ask your doctor for a different pain medicine. · If your doctor prescribed antibiotics, take them as directed. Do not stop taking them just because you feel better. You need to take the full course of antibiotics. Incision care  · If your doctor told you how to care for your cut (incision), follow your doctor's instructions.  You will have a dressing over the cut. A dressing helps the incision heal and protects it. Your doctor will tell you how to take care of this. · If you did not get instructions, follow this general advice:  ? If you have strips of tape on the cut the doctor made, leave the tape on for a week or until it falls off.  ? If you have stitches or staples, your doctor will tell you when to come back to have them removed. ? If you have skin adhesive on the cut, leave it on until it falls off. Skin adhesive is also called glue or liquid stitches. ? Change the bandage every day. ? Wash the area daily with warm water, and pat it dry. Don't use hydrogen peroxide or alcohol. They can slow healing. ? You may cover the area with a gauze bandage if it oozes fluid or rubs against clothing. ? You may shower 24 to 48 hours after surgery. Pat the incision dry. Don't swim or take a bath for the first 2 weeks, or until your doctor tells you it is okay. Exercise  · Your rehab program will include a number of exercises to do. Always do them as your therapist tells you. Ice and elevation  · For pain, put ice or a cold pack on the area for 10 to 20 minutes at a time. Put a thin cloth between the ice and your skin. · Your ankle may swell for about 3 months. Prop up your ankle when you ice it or anytime you sit or lie down. Try to keep it above the level of your heart. This will help reduce swelling. Other instructions  Continue to wear your support stockings as your doctor says. These help to prevent blood clots. The length of time that you will have to wear them depends on your activity level and the amount of swelling you have. Most people wear these stockings for 4 to 6 weeks after surgery. Preventing falls is also very important. To prevent falls:  · Arrange furniture so that you will not trip on it. · Get rid of throw rugs, and move electrical cords out of the way. · Walk only in areas with plenty of light.   · Put grab bars in showers and bathtubs. · Avoid icy or snowy sidewalks. · Wear shoes with sturdy, flat soles. Follow-up care is a key part of your treatment and safety. Be sure to make and go to all appointments, and call your doctor if you are having problems. It's also a good idea to know your test results and keep a list of the medicines you take. When should you call for help? [id number] anytime you think you may need emergency care. For example, call if:  · You passed out (lost consciousness). · You have severe trouble breathing. · You have sudden chest pain and shortness of breath, or you cough up blood. Call your doctor now or seek immediate medical care if:  · You have signs that your hip may be dislocated, including:  ? Severe pain and not being able to stand. ? A crooked leg that looks like your hip is out of position. ? Not being able to bend or straighten your leg. · Your leg or foot is cool or pale or changes color. · You cannot feel or move your leg. · You have signs of a blood clot, such as:  ? Pain in your calf, back of the knee, thigh, or groin. ? Redness and swelling in your leg or groin. · Your incision comes open and begins to bleed, or the bleeding increases. · You feel like your heart is racing or beating irregularly. · You have signs of infection, such as:  ? Increased pain, swelling, warmth, or redness. ? Red streaks leading from the incision. ? Pus draining from the incision. ? A fever. Watch closely for changes in your health, and be sure to contact your doctor if:  · You do not have a bowel movement after taking a laxative. · You do not get better as expected. Where can you learn more? Go to http://www.gray.com/  Enter Q746 in the search box to learn more about \"Hip Replacement Surgery (Posterior): What to Expect at Home. \"  Current as of: March 2, 2020               Content Version: 12.5  © 8617-4388 [employer].    Care instructions adapted under license by [address] (which disclaims liability or warranty for this information). If you have questions about a medical condition or this instruction, always ask your healthcare professional. [address] any warranty or liability for your use of this information. These are general instructions for a healthy lifestyle:    No smoking/ No tobacco products/ Avoid exposure to second hand smoke    Surgeon General's Warning:  Quitting smoking now greatly reduces serious risk to your health. Obesity, smoking, and sedentary lifestyle greatly increases your risk for illness    A healthy diet, regular physical exercise & weight monitoring are important for maintaining a healthy lifestyle    You may be retaining fluid if you have a history of heart failure or if you experience any of the following symptoms:  Weight gain of 3 pounds or more overnight or 5 pounds in a week, increased swelling in our hands or feet or shortness of breath while lying flat in bed. Please call your doctor as soon as you notice any of these symptoms; do not wait until your next office visit. Recognize signs and symptoms of STROKE:    F-face looks uneven    A-arms unable to move or move even    S-speech slurred or non-existent    T-time-call 911 as soon as signs and symptoms begin-DO NOT go       Back to bed or wait to see if you get better-TIME IS BRAIN. The discharge information has been reviewed with the patient. The patient verbalized understanding. Information obtained by :  I understand that if any problems occur once I am at home I am to contact my physician. I understand and acknowledge receipt of the instructions indicated above.                                                                                                                                            Physician's or R.N.'s Signature                                                                  Date/Time Patient or Representative Signature                                                          Date/Time

## 2020-07-30 NOTE — PROGRESS NOTES
07/29/20 2024   Oxygen Therapy   O2 Sat (%) 94 %   Pulse via Oximetry 78 beats per minute   O2 Device Room air   Patient placed on continuous sat monitor. Monitor history deleted prior to placing on patient. Patient working on IS achieving 2250ml. Very good effort, technique.

## 2020-07-30 NOTE — PROGRESS NOTES
Problem: Self Care Deficits Care Plan (Adult)  Goal: *Acute Goals and Plan of Care (Insert Text)  Description: GOALS:   DISCHARGE GOALS (in preparation for going home/rehab):  3 days  1. Mr. Paul Salazar will perform one lower body dressing activity with minimal assistance with adaptive equipment to demonstrate improved functional mobility and safety. -GOAL MET 7/30/2020   2. Mr. Paul Salazar will perform one lower body bathing activity with minimal  assistance with adaptive equipment to demonstrate improved functional mobility and safety. -GOAL MET 7/30/2020   3. Mr. Paul Salazar will perform toileting/toilet transfer with contact guard assistance with adaptive equipment to demonstrate improved functional mobility and safety. -GOAL MET 7/30/2020   4. Mr. Paul Salazar will perform shower transfer with contact guard assistance with adaptive equipment to demonstrate improved functional mobility and safety. -GOAL MET 7/30/2020   5. Mr. Paul Salazar will state SUSANA precautions with two verbal cues to demonstrate improved functional mobility and safety. -GOAL MET 7/30/2020        JOINT CAMP OCCUPATIONAL THERAPY SUSANA: Daily Note and Discharge 7/30/2020  INPATIENT: Hospital Day: 2  [place] / Product Type: Medicare /      NAME/AGE/GENDER: Lakshmi Lam is a 69 y.o. male   PRIMARY DIAGNOSIS:  Localized osteoarthrosis of left hip [M16.12]   Procedure(s) and Anesthesia Type:     * LEFT HIP ARTHROPLASTY TOTAL/ - Spinal (Left)  ICD-10: Treatment Diagnosis:    · Pain in left hip (M25.552)  · Stiffness of Left Hip, Not elsewhere classified (U83.272)      ASSESSMENT:     Mr. Paul Salazar is s/p Left SUSANA and presents with decreased weight bearing on L LE and decreased independence with functional mobility and activities of daily living.   Patient completed shower and dressing as charted below in ADL grid and is ambulating with rolling walker and stand by assist.  Patient has met 5/5 goals and plans to return home with good family support. Will do well at home for self cares and transfers during ADL's.  D/C OT for acute deficits. This section established at most recent assessment   PROBLEM LIST (Impairments causing functional limitations):  1. Decreased Strength  2. Decreased ADL/Functional Activities  3. Decreased Transfer Abilities  4. Increased Pain  5. Increased Fatigue  6. Decreased Flexibility/Joint Mobility  7. Decreased Knowledge of Precautions   INTERVENTIONS PLANNED: (Benefits and precautions of occupational therapy have been discussed with the patient.)  1. Activities of daily living training  2. Adaptive equipment training  3. Balance training  4. Clothing management  5. Donning&doffing training  6. Theraputic activity     TREATMENT PLAN: Frequency/Duration: Follow patient 1-2tx to address above goals. Rehabilitation Potential For Stated Goals: Excellent     RECOMMENDED REHABILITATION/EQUIPMENT: (at time of discharge pending progress): Continue Skilled Therapy. OCCUPATIONAL PROFILE AND HISTORY:   History of Present Injury/Illness (Reason for Referral): Pt presents this date s/p (Left) SUSANA. Past Medical History/Comorbidities:   Mr. Elicia Sandhoff  has a past medical history of Anemia, Anxiety, Arthritis, Chronic pain, Cigar smoker, GERD (gastroesophageal reflux disease), History of colon cancer, Malignant neoplasm of ascending colon ([facility] [address] PUD (peptic ulcer disease) (1979), Rectal hemorrhage, and Weakness of right upper extremity. Mr. Elicia Sandhoff  has a past surgical history that includes hx cataract removal (Bilateral, 2017); hx heent (1988); hx cervical fusion (2013); hx lumbar fusion (1992); hx colonoscopy; and hx colectomy (Right, 07/2019).   Social History/Living Environment:   Home Environment: Private residence  # Steps to Enter: 0  One/Two Story Residence: One story(sunken den, 1 step)  Living Alone: No  Support Systems: Spouse/Significant Other/Partner  Patient Expects to be Discharged to[de-identified] Private residence  Current DME Used/Available at Home: None  Prior Level of Function/Work/Activity:  Independent prior. Number of Personal Factors/Comorbidities that affect the Plan of Care: Brief history (0):  LOW COMPLEXITY   ASSESSMENT OF OCCUPATIONAL PERFORMANCE[de-identified]   Most Recent Physical Functioning:   Balance  Sitting: Intact  Standing: With support                  LLE AROM  L Hip Flexion: 90  L Hip ABduction: 10  L Knee Extension: 0 Coordination  Fine Motor Skills-Upper: Left Intact; Right Intact  Gross Motor Skills-Upper: Left Intact; Right Intact         Mental Status  Neurologic State: Alert  Orientation Level: Oriented X4  Cognition: Appropriate decision making  Perception: Appears intact                Basic ADLs (From Assessment) Complex ADLs (From Assessment)   Basic ADL  Feeding: Independent  Oral Facial Hygiene/Grooming: Independent  Bathing: Stand-by assistance  Type of Bath: Chlorhexidine (CHG), Full, Shower  Upper Body Dressing: Independent  Lower Body Dressing: Stand-by assistance  Toileting: Stand by assistance     Grooming/Bathing/Dressing Activities of Daily Living   Grooming  Grooming Assistance: Independent     Upper Body Bathing  Bathing Assistance: Supervision     Lower Body Bathing  Bathing Assistance: Stand-by assistance     Upper Body Dressing Assistance  Dressing Assistance: Supervision Functional Transfers  Bathroom Mobility: Supervision/set up  Toilet Transfer : Supervision  Shower Transfer: Stand-by assistance   Lower Body Dressing Assistance  Dressing Assistance: Stand-by assistance  Underpants: Stand-by assistance  Socks: Supervision Bed/Mat Mobility  Supine to Sit: Stand-by assistance  Sit to Stand: Supervision;Stand-by assistance  Stand to Sit: Supervision;Stand-by assistance  Bed to Chair: Supervision  Scooting: Stand-by assistance         Physical Skills Involved:  1. Range of Motion  2. Balance  3.  Strength Cognitive Skills Affected (resulting in the inability to perform in a timely and safe manner):  1. [facility] Psychosocial Skills Affected:  1. WFL   Number of elements that affect the Plan of Care: 1-3:  LOW COMPLEXITY   CLINICAL DECISION MAKIN[address] [place] [address] AM-PAC 6 Clicks   Daily Activity Inpatient Short Form  How much help from another person does the patient currently need. .. Total A Lot A Little None   1. Putting on and taking off regular lower body clothing? [] 1   [] 2   [x] 3   [] 4   2. Bathing (including washing, rinsing, drying)? [] 1   [] 2   [x] 3   [] 4   3. Toileting, which includes using toilet, bedpan or urinal?   [] 1   [] 2   [x] 3   [] 4   4. Putting on and taking off regular upper body clothing? [] 1   [] 2   [] 3   [x] 4   5. Taking care of personal grooming such as brushing teeth? [] 1   [] 2   [] 3   [x] 4   6. Eating meals? [] 1   [] 2   [] 3   [x] 4   © , Trustees of [address] [place] [address], under license to [employer]. All rights reserved     Score:  Initial: 18 Most Recent: 21 (Date: 2020 )    Interpretation of Tool:  Represents activities that are increasingly more difficult (i.e. Bed mobility, Transfers, Gait). Medical Necessity:     · Skilled intervention continues to be required due to Deficits noted abvoe. Reason for Services/Other Comments:  · Patient continues to require skilled intervention due to   · New SUSANA  · . Use of outcome tool(s) and clinical judgement create a POC that gives a: MODERATE COMPLEXITY            TREATMENT:   (In addition to Assessment/Re-Assessment sessions the following treatments were rendered)     Pre-treatment Symptoms/Complaints:    Pain: Initial:   Pain Intensity 1: 2  Pain Location 1: Hip  Pain Orientation 1: Left  Post Session:  0     Self Care: (40): Procedure(s) (per grid) utilized to improve and/or restore self-care/home management as related to dressing, bathing, toileting and grooming.  Required minimal verbal and tactile cueing to facilitate activities of daily living skills. Treatment/Session Assessment:     Response to Treatment:  Good, sitting up in recliner. Education:  [] Home Exercises  [x] Fall Precautions  [x] Hip Precautions [] Going Home Video  [] Knee/Hip Prosthesis Review  [x] Walker Management/Safety [x] Adaptive Equipment as Needed       Interdisciplinary Collaboration:   o Physical Therapist  o Occupational Therapist  o Registered Nurse    After treatment position/precautions:   o Up in chair  o Bed/Chair-wheels locked  o Caregiver at bedside  o Call light within reach  o RN notified     Compliance with Program/Exercises: Compliant all of the time, Will assess as treatment progresses. Recommendations/Intent for next treatment session:  D/C OT for acute deficits.        Total Treatment Duration:  OT Patient Time In/Time Out  Time In: 9979  Time Out: Trevor 35, OT

## 2020-07-30 NOTE — PROGRESS NOTES
Problem: Mobility Impaired (Adult and Pediatric)  Goal: *Acute Goals and Plan of Care (Insert Text)  Description: GOALS (1-4 days):  (1.)Mr. Jon Watkins will move from supine to sit and sit to supine  in bed with INDEPENDENT. (2.)Mr. Jon Watkins will transfer from bed to chair and chair to bed with SUPERVISION using the least restrictive device. (3.)Mr. Jon Watkins will ambulate with SUPERVISION for 200 feet with the least restrictive device. (4.)Mr. Jon Watkins will ambulate up/down 1 steps with No railing with STAND BY ASSIST with walker. (5.)Mr. Jon Watkins will state/observe SUSANA precautions with No verbal cues. Goals met  ________________________________________________________________________________________________   Outcome: Progressing Towards Goal     PHYSICAL THERAPY JOINT CAMP SUSANA: Daily Note and AM 7/30/2020  INPATIENT: Hospital Day: 2  [place] / Product Type: Medicare /      NAME/AGE/GENDER: Mariah Driver is a 69 y.o. male   PRIMARY DIAGNOSIS:  Localized osteoarthrosis of left hip [M16.12]   Procedure(s) and Anesthesia Type:     * LEFT HIP ARTHROPLASTY TOTAL/ - Spinal (Left)  ICD-10: Treatment Diagnosis:    · Pain in left hip (M25.552)  · Stiffness of Left Hip, Not elsewhere classified (M25.652)  · Difficulty in walking, Not elsewhere classified (R26.2)      ASSESSMENT:     Mr. Jon Watkins presents with impaired strength & mobility s/p left SUSANA. This pt will benefit from follow up therapy to help restore safe function prior to returning home with caregiver. Pt. Doing very well this am with no complaints. He reports he feels better. He did susana exercises with cues. Reviewed safety and susana precautions and importance of not falling. He ambulated well with RW and did stairs without any problems. He has no questions or concerns about going home today. This section established at most recent assessment   PROBLEM LIST (Impairments causing functional limitations):  1.  Decreased Strength  2. Decreased ADL/Functional Activities  3. Decreased Transfer Abilities  4. Decreased Ambulation Ability/Technique  5. Decreased Balance  6. Increased Pain  7. Decreased Activity Tolerance  8. Decreased Knowledge of Precautions  9. Decreased [place] with Home Exercise Program   INTERVENTIONS PLANNED: (Benefits and precautions of physical therapy have been discussed with the patient.)  1. bed mobility  2. gait training  3. home exercise program (HEP)  4. Range of Motion: active/assisted/passive  5. Therapeutic Activities  6. therapeutic exercise/strengthening  7. transfer training  8. Group Therapy     TREATMENT PLAN: Frequency/Duration: Follow patient BID for duration of hospital stay to address above goals. Rehabilitation Potential For Stated Goals: Good     RECOMMENDED REHABILITATION/EQUIPMENT: (at time of discharge pending progress): Continue Skilled Therapy and Home Health: Physical Therapy. HISTORY:   History of Present Injury/Illness (Reason for Referral):  Left SUSANA  Past Medical History/Comorbidities:   Mr. Petra Burton  has a past medical history of Anemia, Anxiety, Arthritis, Chronic pain, Cigar smoker, GERD (gastroesophageal reflux disease), History of colon cancer, Malignant neoplasm of ascending colon ([facility] [address] PUD (peptic ulcer disease) (1979), Rectal hemorrhage, and Weakness of right upper extremity. Mr. Petra Burton  has a past surgical history that includes hx cataract removal (Bilateral, 2017); hx heent (1988); hx cervical fusion (2013); hx lumbar fusion (1992); hx colonoscopy; and hx colectomy (Right, 07/2019).   Social History/Living Environment:   Home Environment: Private residence  # Steps to Enter: 0  One/Two Story Residence: One story(sunken den, 1 step)  Living Alone: No  Support Systems: Spouse/Significant Other/Partner  Patient Expects to be Discharged to[de-identified] Private residence  Current DME Used/Available at Home: None  Prior Level of Function/Work/Activity:  Pt was independent without an assistive device prior to this admission   Number of Personal Factors/Comorbidities that affect the Plan of Care: 3+: HIGH COMPLEXITY   EXAMINATION:   Most Recent Physical Functioning:                 LLE AROM  L Hip Flexion: 90  L Hip ABduction: 10  L Knee Extension: 0               Transfers  Sit to Stand: Supervision;Stand-by assistance  Stand to Sit: Supervision;Stand-by assistance    Balance  Sitting: Intact  Standing: With support              Weight Bearing Status  Left Side Weight Bearing: As tolerated  Distance (ft): 200 Feet (ft)  Ambulation - Level of Assistance: Stand-by assistance;Supervision  Assistive Device: Walker, rolling  Speed/She: Delayed  Step Length: Left shortened;Right shortened  Stance: Left decreased  Gait Abnormalities: Antalgic  Number of Stairs Trained: 3  Stairs - Level of Assistance: Stand-by assistance  Rail Use: Both(also one step with walker)  Interventions: Safety awareness training;Verbal cues     Braces/Orthotics: none    Left Hip Cold  Type: Cold/ice pack      Body Structures Involved:  1. Joints  2. Muscles Body Functions Affected:  1. Sensory/Pain  2. Movement Related Activities and Participation Affected:  1. General Tasks and Demands  2. Mobility   Number of elements that affect the Plan of Care: 4+: HIGH COMPLEXITY   CLINICAL PRESENTATION:   Presentation: Stable and uncomplicated: LOW COMPLEXITY   CLINICAL DECISION MAKIN [facility] [address] [address] AM-PAC 6 Clicks   Basic Mobility Inpatient Short Form  How much difficulty does the patient currently have. .. Unable A Lot A Little None   1. Turning over in bed (including adjusting bedclothes, sheets and blankets)? [] 1   [] 2   [x] 3   [] 4   2. Sitting down on and standing up from a chair with arms ( e.g., wheelchair, bedside commode, etc.)   [] 1   [] 2   [x] 3   [] 4   3. Moving from lying on back to sitting on the side of the bed?    [] 1   [] 2   [x] 3   [] 4   How much help from another person does the patient currently need. .. Total A Lot A Little None   4. Moving to and from a bed to a chair (including a wheelchair)? [] 1   [] 2   [x] 3   [] 4   5. Need to walk in hospital room? [] 1   [] 2   [x] 3   [] 4   6. Climbing 3-5 steps with a railing? [x] 1   [] 2   [] 3   [] 4   © 2007, Trustees of [address] [place] [address] [address] [address], under license to [employer]. All rights reserved     Score:  Initial: 16 Most Recent: X (Date: -- )    Interpretation of Tool:  Represents activities that are increasingly more difficult (i.e. Bed mobility, Transfers, Gait). Medical Necessity:     · Patient is expected to demonstrate progress in   · strength, balance, coordination, and functional technique  ·  to   · decrease assistance required with bed mobility, transfers & gait  · .  Reason for Services/Other Comments:  · Patient continues to require skilled intervention due to   · Pt not independent with functional mobility  · . Use of outcome tool(s) and clinical judgement create a POC that gives a: Clear prediction of patient's progress: LOW COMPLEXITY            TREATMENT:   (In addition to Assessment/Re-Assessment sessions the following treatments were rendered)     Pre-treatment Symptoms/Complaints:  Hip sore  Pain Initial: numeric scale     Post Session:  3     Therapeutic Exercise: (15 Minutes):  Exercises per grid below to improve mobility and dynamic movement of leg - left to improve functional endurance . Required minimal verbal cues to promote proper body alignment and promote proper body mechanics. Gait Training (15 Minutes):  Gait training to improve and/or restore physical functioning as related to mobility, strength and balance. Ambulated 200 Feet (ft) with Stand-by assistance;Supervision using a Walker, rolling and minimal Safety awareness training;Verbal cues related to their stance phase to promote proper body alignment, promote proper body posture and promote proper body mechanics.        Date:  7/29 Date:  7/30 Date:     ACTIVITY/EXERCISE AM PM AM PM AM PM   GROUP THERAPY  []  []  []  []  []  []   Ankle Pumps  10 15a      Quad Sets  10 15a      Gluteal Sets  10 15a      Hip ABd/ADduction  10 15aa      Straight Leg Raises  --       Knee Slides  10 15a      Short Arc Quads  10 15a      Long Arc Quads  10 15a      Chair Slides  --                B = bilateral; AA = active assistive; A = active; P = passive      Treatment/Session Assessment:     Response to Treatment:  Did well    Education:  [x] Home Exercises  [x] Fall Precautions  [x] Hip Precautions [x] D/C Instruction Review  [x] Hip Prosthesis Review  [x] Walker Management/Safety [] Adaptive Equipment as Needed       Interdisciplinary Collaboration:   o Registered Nurse    After treatment position/precautions:   o Up in chair  o Bed/Chair-wheels locked  o Bed in low position  o Call light within reach    Compliance with Program/Exercises: Compliant most of the time. Recommendations/Intent for next treatment session:  Treatment next visit will focus on increasing Mr. Nogueras independence with bed mobility, transfers, gait training, strength/ROM exercises, modalities for pain, and patient education.       Total Treatment Duration:  PT Patient Time In/Time Out  Time In: 0840  Time Out: Vic Swan 57, PT

## 2020-07-31 ENCOUNTER — PATIENT OUTREACH (OUTPATIENT)
Dept: CASE MANAGEMENT | Age: 70
End: 2020-07-31

## 2020-07-31 NOTE — PROGRESS NOTES
This note will not be viewable in [address] 1st attempt to contact patient for [facility] [place] call, no answer, left message on voicemail to please return my call. Will attempt 2nd call within 4 business days, due to upcoming weekend.

## 2020-08-01 ENCOUNTER — HOME CARE VISIT (OUTPATIENT)
Dept: SCHEDULING | Facility: HOME HEALTH | Age: 70
End: 2020-08-01
Payer: MEDICARE

## 2020-08-01 VITALS
RESPIRATION RATE: 18 BRPM | SYSTOLIC BLOOD PRESSURE: 132 MMHG | DIASTOLIC BLOOD PRESSURE: 76 MMHG | TEMPERATURE: 98.1 F | HEART RATE: 76 BPM

## 2020-08-01 PROCEDURE — 400013 HH SOC

## 2020-08-01 PROCEDURE — G0151 HHCP-SERV OF PT,EA 15 MIN: HCPCS

## 2020-08-01 PROCEDURE — 3331090002 HH PPS REVENUE DEBIT

## 2020-08-01 PROCEDURE — 3331090001 HH PPS REVENUE CREDIT

## 2020-08-02 PROCEDURE — 3331090001 HH PPS REVENUE CREDIT

## 2020-08-02 PROCEDURE — 3331090002 HH PPS REVENUE DEBIT

## 2020-08-03 ENCOUNTER — HOME CARE VISIT (OUTPATIENT)
Dept: SCHEDULING | Facility: HOME HEALTH | Age: 70
End: 2020-08-03
Payer: MEDICARE

## 2020-08-03 VITALS
TEMPERATURE: 98.3 F | HEART RATE: 78 BPM | SYSTOLIC BLOOD PRESSURE: 128 MMHG | DIASTOLIC BLOOD PRESSURE: 78 MMHG | RESPIRATION RATE: 18 BRPM

## 2020-08-03 PROCEDURE — 3331090002 HH PPS REVENUE DEBIT

## 2020-08-03 PROCEDURE — G0157 HHC PT ASSISTANT EA 15: HCPCS

## 2020-08-03 PROCEDURE — 3331090001 HH PPS REVENUE CREDIT

## 2020-08-04 PROCEDURE — 3331090002 HH PPS REVENUE DEBIT

## 2020-08-04 PROCEDURE — 3331090001 HH PPS REVENUE CREDIT

## 2020-08-05 PROCEDURE — 3331090001 HH PPS REVENUE CREDIT

## 2020-08-05 PROCEDURE — 3331090002 HH PPS REVENUE DEBIT

## 2020-08-06 PROCEDURE — 3331090002 HH PPS REVENUE DEBIT

## 2020-08-06 PROCEDURE — 3331090001 HH PPS REVENUE CREDIT

## 2020-08-07 ENCOUNTER — HOME CARE VISIT (OUTPATIENT)
Dept: SCHEDULING | Facility: HOME HEALTH | Age: 70
End: 2020-08-07
Payer: MEDICARE

## 2020-08-07 VITALS
SYSTOLIC BLOOD PRESSURE: 132 MMHG | DIASTOLIC BLOOD PRESSURE: 78 MMHG | RESPIRATION RATE: 18 BRPM | HEART RATE: 78 BPM | TEMPERATURE: 98.2 F

## 2020-08-07 PROCEDURE — 3331090002 HH PPS REVENUE DEBIT

## 2020-08-07 PROCEDURE — 3331090001 HH PPS REVENUE CREDIT

## 2020-08-07 PROCEDURE — G0151 HHCP-SERV OF PT,EA 15 MIN: HCPCS

## 2020-08-08 PROCEDURE — 3331090002 HH PPS REVENUE DEBIT

## 2020-08-08 PROCEDURE — 3331090001 HH PPS REVENUE CREDIT

## 2020-08-09 PROCEDURE — 3331090002 HH PPS REVENUE DEBIT

## 2020-08-09 PROCEDURE — 3331090001 HH PPS REVENUE CREDIT

## 2020-08-10 PROCEDURE — 3331090002 HH PPS REVENUE DEBIT

## 2020-08-10 PROCEDURE — 3331090001 HH PPS REVENUE CREDIT

## 2020-08-11 ENCOUNTER — HOME CARE VISIT (OUTPATIENT)
Dept: SCHEDULING | Facility: HOME HEALTH | Age: 70
End: 2020-08-11
Payer: MEDICARE

## 2020-08-11 VITALS
RESPIRATION RATE: 17 BRPM | TEMPERATURE: 98.5 F | DIASTOLIC BLOOD PRESSURE: 75 MMHG | SYSTOLIC BLOOD PRESSURE: 132 MMHG | HEART RATE: 72 BPM

## 2020-08-11 PROCEDURE — G0151 HHCP-SERV OF PT,EA 15 MIN: HCPCS

## 2020-08-11 PROCEDURE — 3331090001 HH PPS REVENUE CREDIT

## 2020-08-11 PROCEDURE — 3331090002 HH PPS REVENUE DEBIT

## 2020-08-12 PROCEDURE — 3331090001 HH PPS REVENUE CREDIT

## 2020-08-12 PROCEDURE — 3331090002 HH PPS REVENUE DEBIT

## 2020-08-13 ENCOUNTER — HOME CARE VISIT (OUTPATIENT)
Dept: HOME HEALTH SERVICES | Facility: HOME HEALTH | Age: 70
End: 2020-08-13
Payer: MEDICARE

## 2020-08-13 ENCOUNTER — HOME CARE VISIT (OUTPATIENT)
Dept: SCHEDULING | Facility: HOME HEALTH | Age: 70
End: 2020-08-13
Payer: MEDICARE

## 2020-08-13 PROCEDURE — 3331090002 HH PPS REVENUE DEBIT

## 2020-08-13 PROCEDURE — 3331090001 HH PPS REVENUE CREDIT

## 2020-08-14 PROCEDURE — 3331090002 HH PPS REVENUE DEBIT

## 2020-08-14 PROCEDURE — 3331090001 HH PPS REVENUE CREDIT

## 2020-08-15 PROCEDURE — 3331090002 HH PPS REVENUE DEBIT

## 2020-08-15 PROCEDURE — 3331090001 HH PPS REVENUE CREDIT

## 2020-08-16 ENCOUNTER — HOME CARE VISIT (OUTPATIENT)
Dept: HOME HEALTH SERVICES | Facility: HOME HEALTH | Age: 70
End: 2020-08-16
Payer: MEDICARE

## 2020-08-16 ENCOUNTER — HOSPITAL ENCOUNTER (EMERGENCY)
Age: 70
Discharge: HOME | End: 2020-08-17
Attending: EMERGENCY MEDICINE
Payer: MEDICARE

## 2020-08-16 ENCOUNTER — APPOINTMENT (OUTPATIENT)
Dept: GENERAL RADIOLOGY | Age: 70
End: 2020-08-16
Attending: EMERGENCY MEDICINE
Payer: MEDICARE

## 2020-08-16 DIAGNOSIS — S73.005A HIP DISLOCATION, LEFT, INITIAL ENCOUNTER (HCC): Primary | ICD-10-CM

## 2020-08-16 PROCEDURE — 99285 EMERGENCY DEPT VISIT HI MDM: CPT

## 2020-08-16 PROCEDURE — 73502 X-RAY EXAM HIP UNI 2-3 VIEWS: CPT

## 2020-08-16 PROCEDURE — 96374 THER/PROPH/DIAG INJ IV PUSH: CPT

## 2020-08-16 PROCEDURE — 3331090002 HH PPS REVENUE DEBIT

## 2020-08-16 PROCEDURE — 3331090001 HH PPS REVENUE CREDIT

## 2020-08-16 PROCEDURE — 75810000301 HC ER LEVEL 1 CLOSED TREATMNT FRACTURE/DISLOCATION

## 2020-08-16 RX ORDER — MORPHINE SULFATE 10 MG/ML
6 INJECTION, SOLUTION INTRAMUSCULAR; INTRAVENOUS
Status: COMPLETED | OUTPATIENT
Start: 2020-08-16 | End: 2020-08-17

## 2020-08-16 RX ORDER — ONDANSETRON 2 MG/ML
4 INJECTION INTRAMUSCULAR; INTRAVENOUS
Status: COMPLETED | OUTPATIENT
Start: 2020-08-16 | End: 2020-08-17

## 2020-08-16 RX ORDER — PROPOFOL 10 MG/ML
200 INJECTION, EMULSION INTRAVENOUS
Status: DISCONTINUED | OUTPATIENT
Start: 2020-08-16 | End: 2020-08-17 | Stop reason: HOSPADM

## 2020-08-17 ENCOUNTER — APPOINTMENT (OUTPATIENT)
Dept: GENERAL RADIOLOGY | Age: 70
End: 2020-08-17
Attending: EMERGENCY MEDICINE
Payer: MEDICARE

## 2020-08-17 ENCOUNTER — HOME CARE VISIT (OUTPATIENT)
Dept: HOME HEALTH SERVICES | Facility: HOME HEALTH | Age: 70
End: 2020-08-17
Payer: MEDICARE

## 2020-08-17 VITALS
TEMPERATURE: 98.2 F | OXYGEN SATURATION: 99 % | BODY MASS INDEX: 29.82 KG/M2 | WEIGHT: 190 LBS | DIASTOLIC BLOOD PRESSURE: 110 MMHG | RESPIRATION RATE: 33 BRPM | HEART RATE: 90 BPM | HEIGHT: 67 IN | SYSTOLIC BLOOD PRESSURE: 148 MMHG

## 2020-08-17 LAB
ALBUMIN SERPL-MCNC: 3.6 G/DL (ref 3.2–4.6)
ALBUMIN/GLOB SERPL: 1 {RATIO} (ref 1.2–3.5)
ALP SERPL-CCNC: 103 U/L (ref 50–136)
ALT SERPL-CCNC: 23 U/L (ref 12–65)
ANION GAP SERPL CALC-SCNC: 8 MMOL/L (ref 7–16)
AST SERPL-CCNC: 29 U/L (ref 15–37)
BASOPHILS # BLD: 0 K/UL (ref 0–0.2)
BASOPHILS NFR BLD: 0 % (ref 0–2)
BILIRUB SERPL-MCNC: 0.5 MG/DL (ref 0.2–1.1)
BUN SERPL-MCNC: 16 MG/DL (ref 8–23)
CALCIUM SERPL-MCNC: 8.6 MG/DL (ref 8.3–10.4)
CHLORIDE SERPL-SCNC: 101 MMOL/L (ref 98–107)
CO2 SERPL-SCNC: 28 MMOL/L (ref 21–32)
CREAT SERPL-MCNC: 0.79 MG/DL (ref 0.8–1.5)
DIFFERENTIAL METHOD BLD: ABNORMAL
EOSINOPHIL # BLD: 0 K/UL (ref 0–0.8)
EOSINOPHIL NFR BLD: 0 % (ref 0.5–7.8)
ERYTHROCYTE [DISTWIDTH] IN BLOOD BY AUTOMATED COUNT: 15.7 % (ref 11.9–14.6)
GLOBULIN SER CALC-MCNC: 3.5 G/DL (ref 2.3–3.5)
GLUCOSE SERPL-MCNC: 139 MG/DL (ref 65–100)
HCT VFR BLD AUTO: 35.1 % (ref 41.1–50.3)
HGB BLD-MCNC: 11.4 G/DL (ref 13.6–17.2)
IMM GRANULOCYTES # BLD AUTO: 0 K/UL (ref 0–0.5)
IMM GRANULOCYTES NFR BLD AUTO: 0 % (ref 0–5)
LYMPHOCYTES # BLD: 0.8 K/UL (ref 0.5–4.6)
LYMPHOCYTES NFR BLD: 11 % (ref 13–44)
MCH RBC QN AUTO: 26.7 PG (ref 26.1–32.9)
MCHC RBC AUTO-ENTMCNC: 32.5 G/DL (ref 31.4–35)
MCV RBC AUTO: 82.2 FL (ref 79.6–97.8)
MONOCYTES # BLD: 0.3 K/UL (ref 0.1–1.3)
MONOCYTES NFR BLD: 4 % (ref 4–12)
NEUTS SEG # BLD: 5.9 K/UL (ref 1.7–8.2)
NEUTS SEG NFR BLD: 84 % (ref 43–78)
NRBC # BLD: 0 K/UL (ref 0–0.2)
PLATELET # BLD AUTO: 147 K/UL (ref 150–450)
PMV BLD AUTO: 8 FL (ref 9.4–12.3)
POTASSIUM SERPL-SCNC: 4.1 MMOL/L (ref 3.5–5.1)
PROT SERPL-MCNC: 7.1 G/DL (ref 6.3–8.2)
RBC # BLD AUTO: 4.27 M/UL (ref 4.23–5.6)
SODIUM SERPL-SCNC: 137 MMOL/L (ref 136–145)
WBC # BLD AUTO: 7 K/UL (ref 4.3–11.1)

## 2020-08-17 PROCEDURE — 85025 COMPLETE CBC W/AUTO DIFF WBC: CPT

## 2020-08-17 PROCEDURE — 96374 THER/PROPH/DIAG INJ IV PUSH: CPT

## 2020-08-17 PROCEDURE — 73502 X-RAY EXAM HIP UNI 2-3 VIEWS: CPT

## 2020-08-17 PROCEDURE — 3331090001 HH PPS REVENUE CREDIT

## 2020-08-17 PROCEDURE — 3331090002 HH PPS REVENUE DEBIT

## 2020-08-17 PROCEDURE — 80053 COMPREHEN METABOLIC PANEL: CPT

## 2020-08-17 PROCEDURE — 74011250636 HC RX REV CODE- 250/636: Performed by: EMERGENCY MEDICINE

## 2020-08-17 PROCEDURE — 75810000301 HC ER LEVEL 1 CLOSED TREATMNT FRACTURE/DISLOCATION

## 2020-08-17 RX ORDER — HYDROCODONE BITARTRATE AND ACETAMINOPHEN 7.5; 325 MG/1; MG/1
1 TABLET ORAL
Qty: 15 TAB | Refills: 0 | Status: SHIPPED | OUTPATIENT
Start: 2020-08-17 | End: 2020-08-22

## 2020-08-17 RX ADMIN — MORPHINE SULFATE 6 MG: 10 INJECTION INTRAVENOUS at 00:29

## 2020-08-17 RX ADMIN — SODIUM CHLORIDE 1000 ML: 900 INJECTION, SOLUTION INTRAVENOUS at 00:22

## 2020-08-17 RX ADMIN — PROPOFOL 70 MG: 10 INJECTION, EMULSION INTRAVENOUS at 00:33

## 2020-08-17 RX ADMIN — ONDANSETRON 4 MG: 2 INJECTION INTRAMUSCULAR; INTRAVENOUS at 00:29

## 2020-08-17 NOTE — ED PROVIDER NOTES
Patient with left hip replacement 3 weeks ago by Dr. Lexy Bose. He was sitting too long at home and when he went to stand up felt his left hip dislocate. Went to his knees and called EMS. The history is provided by the patient. No  was used. Hip Pain    This is a new problem. The current episode started less than 1 hour ago. The problem occurs constantly. The problem has not changed since onset. The pain is present in the left hip. The quality of the pain is described as aching. The pain is moderate. Associated symptoms include limited range of motion. Pertinent negatives include no numbness, no back pain and no neck pain. He has tried nothing for the symptoms. There has been no history of extremity trauma.         Past Medical History:   Diagnosis Date    Anemia     Anxiety     Arthritis     OA    Chronic pain     headaches    Cigar smoker     GERD (gastroesophageal reflux disease)     controlled with medication    History of colon cancer     Malignant neoplasm of ascending colon (HCC)     PUD (peptic ulcer disease) 1979    Rectal hemorrhage     Weakness of right upper extremity        Past Surgical History:   Procedure Laterality Date    HX CATARACT REMOVAL Bilateral 2017    HX CERVICAL FUSION  2013    HX COLECTOMY Right 07/2019    HX COLONOSCOPY      HX HEENT  1988    acoustic neuroma    HX LUMBAR FUSION  1992         Family History:   Problem Relation Age of Onset    Cancer Mother         breast    Diabetes Mother     Hypertension Mother     Heart Attack Father     Hypertension Brother        Social History     Socioeconomic History    Marital status:      Spouse name: Not on file    Number of children: Not on file    Years of education: Not on file    Highest education level: Not on file   Occupational History    Not on file   Social Needs    Financial resource strain: Not on file    Food insecurity     Worry: Not on file     Inability: Not on file   [address] [facility] Transportation needs     Medical: Not on file     Non-medical: Not on file   Tobacco Use    Smoking status: Current Some Day Smoker     Types: Cigars    Smokeless tobacco: Former User     Quit date: 2018    Tobacco comment: occ. cigar   Substance and Sexual Activity    Alcohol use: Yes     Alcohol/week: 5.0 - 6.0 standard drinks     Types: 5 - 6 Cans of beer per week    Drug use: Never    Sexual activity: Not on file   Lifestyle    Physical activity     Days per week: Not on file     Minutes per session: Not on file    Stress: Not on file   Relationships    Social connections     Talks on phone: Not on file     Gets together: Not on file     Attends Rastafarian service: Not on file     Active member of club or organization: Not on file     Attends meetings of clubs or organizations: Not on file     Relationship status: Not on file    Intimate partner violence     Fear of current or ex partner: Not on file     Emotionally abused: Not on file     Physically abused: Not on file     Forced sexual activity: Not on file   Other Topics Concern    Not on file   Social History Narrative    Not on file         ALLERGIES: Patient has no known allergies. Review of Systems   Constitutional: Negative for chills and fever. Eyes: Negative for pain and redness. Respiratory: Negative for chest tightness, shortness of breath and wheezing. Cardiovascular: Negative for chest pain and leg swelling. Gastrointestinal: Negative for abdominal pain, diarrhea, nausea and vomiting. Musculoskeletal: Positive for arthralgias and gait problem. Negative for back pain, neck pain and neck stiffness. Skin: Negative for color change and rash. Neurological: Negative for weakness, numbness and headaches.        Vitals:    08/16/20 2235   BP: (!) 185/102   Pulse: 97   Resp: 16   Temp: 99.1 °F (37.3 °C)   SpO2: 91%   Weight: 86.2 kg (190 lb)   Height: 5' 7\" (1.702 m)            Physical Exam  Constitutional:       Appearance: Normal appearance. He is well-developed. HENT:      Head: Normocephalic and atraumatic. Cardiovascular:      Rate and Rhythm: Normal rate and regular rhythm. Pulmonary:      Effort: Pulmonary effort is normal.      Breath sounds: Normal breath sounds. Abdominal:      General: Bowel sounds are normal.      Palpations: Abdomen is soft. Tenderness: There is no abdominal tenderness. Musculoskeletal:         General: Tenderness (left hip. ) and deformity present. Skin:     General: Skin is warm and dry. Neurological:      Mental Status: He is alert and oriented to person, place, and time. MDM  Number of Diagnoses or Management Options  Diagnosis management comments: Left hip reduction. Immobilizer placed and crutches given. Ortho to follow up.         Amount and/or Complexity of Data Reviewed  Clinical lab tests: ordered and reviewed  Tests in the radiology section of CPT®: ordered and reviewed  Tests in the medicine section of CPT®: ordered and reviewed    Patient Progress  Patient progress: stable         Procedural Sedation    Date/Time: 8/17/2020 12:57 AM  Performed by: Ruiz Ortiz MD  Authorized by: Ruiz Ortiz MD     Consent:     Consent obtained:  Written    Consent given by:  Patient  Indications:     Procedure performed:  Dislocation reduction    Procedure necessitating sedation performed by:  Physician performing sedation    Intended level of sedation:  Moderate (conscious sedation)  Pre-sedation assessment:     NPO status caution: unable to specify NPO status      ASA classification: class 2 - patient with mild systemic disease      Neck mobility: normal      Mallampati score:  II - soft palate, uvula, fauces visible    Pre-sedation assessments completed and reviewed: airway patency, cardiovascular function, hydration status, mental status, nausea/vomiting, pain level, respiratory function and temperature    Immediate pre-procedure details:     Reassessment: Patient reassessed immediately prior to procedure      Reviewed: vital signs, relevant labs/tests and NPO status      Verified: bag valve mask available, emergency equipment available, intubation equipment available, IV patency confirmed, oxygen available, reversal medications available and suction available    Procedure details (see MAR for exact dosages):     Preoxygenation:  Nasal cannula    Sedation:  Propofol    Analgesia:  Morphine    Intra-procedure monitoring:  Blood pressure monitoring, cardiac monitor, continuous pulse oximetry, frequent LOC assessments and frequent vital sign checks    Intra-procedure events: hypoxia      Intra-procedure management:  Supplemental oxygen  Post-procedure details:     Attendance: Constant attendance by certified staff until patient recovered      Recovery: Patient returned to pre-procedure baseline      Estimated blood loss (see I/O flowsheets): no      Post-sedation assessments completed and reviewed: airway patency, cardiovascular function, hydration status, mental status, nausea/vomiting, pain level, respiratory function and temperature      Patient is stable for discharge or admission: yes      Patient tolerance: Tolerated well, no immediate complications  Reduction of Joint    Date/Time: 8/17/2020 1:00 AM  Performed by: Samuel Sherman MD  Authorized by: Samuel Sherman MD     Consent:     Consent obtained:  Written    Consent given by:  Patient    Risks discussed:  Pain    Alternatives discussed:  No treatment  Injury:     Injury location:  Hip    Hip injury location:  L hip  Pre-procedure assessment:     Neurological function: normal      Distal perfusion: normal      Range of motion: reduced    Sedation:     Sedation type: Moderate (conscious) sedation  Anesthesia (see MAR for exact dosages):      Anesthesia method:  None  Procedure details:     Manipulation performed: yes      Reduction successful: yes      X-ray confirmed reduction: yes      Supplies used:  Knee immobilizer  Post-procedure assessment:     Neurological function: normal      Distal perfusion: normal      Range of motion: improved      Patient tolerance of procedure: Tolerated well, no immediate complications          Results Include:    Recent Results (from the past 24 hour(s))   CBC WITH AUTOMATED DIFF    Collection Time: 08/17/20 12:08 AM   Result Value Ref Range    WBC 7.0 4.3 - 11.1 K/uL    RBC 4.27 4.23 - 5.6 M/uL    HGB 11.4 (L) 13.6 - 17.2 g/dL    HCT 35.1 (L) 41.1 - 50.3 %    MCV 82.2 79.6 - 97.8 FL    MCH 26.7 26.1 - 32.9 PG    MCHC 32.5 31.4 - 35.0 g/dL    RDW 15.7 (H) 11.9 - 14.6 %    PLATELET 671 (L) 385 - 450 K/uL    MPV 8.0 (L) 9.4 - 12.3 FL    ABSOLUTE NRBC 0.00 0.0 - 0.2 K/uL    DF AUTOMATED      NEUTROPHILS 84 (H) 43 - 78 %    LYMPHOCYTES 11 (L) 13 - 44 %    MONOCYTES 4 4.0 - 12.0 %    EOSINOPHILS 0 (L) 0.5 - 7.8 %    BASOPHILS 0 0.0 - 2.0 %    IMMATURE GRANULOCYTES 0 0.0 - 5.0 %    ABS. NEUTROPHILS 5.9 1.7 - 8.2 K/UL    ABS. LYMPHOCYTES 0.8 0.5 - 4.6 K/UL    ABS. MONOCYTES 0.3 0.1 - 1.3 K/UL    ABS. EOSINOPHILS 0.0 0.0 - 0.8 K/UL    ABS. BASOPHILS 0.0 0.0 - 0.2 K/UL    ABS. IMM. GRANS. 0.0 0.0 - 0.5 K/UL   METABOLIC PANEL, COMPREHENSIVE    Collection Time: 08/17/20 12:08 AM   Result Value Ref Range    Sodium 137 136 - 145 mmol/L    Potassium 4.1 3.5 - 5.1 mmol/L    Chloride 101 98 - 107 mmol/L    CO2 28 21 - 32 mmol/L    Anion gap 8 7 - 16 mmol/L    Glucose 139 (H) 65 - 100 mg/dL    BUN 16 8 - 23 MG/DL    Creatinine 0.79 (L) 0.8 - 1.5 MG/DL    GFR est AA >60 >60 ml/min/1.73m2    GFR est non-AA >60 >60 ml/min/1.73m2    Calcium 8.6 8.3 - 10.4 MG/DL    Bilirubin, total 0.5 0.2 - 1.1 MG/DL    ALT (SGPT) 23 12 - 65 U/L    AST (SGOT) 29 15 - 37 U/L    Alk.  phosphatase 103 50 - 136 U/L    Protein, total 7.1 6.3 - 8.2 g/dL    Albumin 3.6 3.2 - 4.6 g/dL    Globulin 3.5 2.3 - 3.5 g/dL    A-G Ratio 1.0 (L) 1.2 - 3.5                XR HIP LT W OR WO PELV 2-3 VWS (Final result)   Result time 08/16/20 23:51:55   Final result by Manuela Suarez MD (08/16/20 23:51:55)                 Impression:     IMPRESSION: Dislocated left hip arthroplasty. Narrative:     EXAM: Pelvis and left hip x-rays. INDICATION: Pain. COMPARISON: Prior pelvis x-ray on July 29, 2020. TECHNIQUE: A frontal view of the pelvis was supplemented with 2 dedicated views   of the left hip. FINDINGS: Again noted is a left hip arthroplasty. There is new superior   dislocation of the femoral head component. No acute fracture is identified.  The   pelvic ring appears intact.

## 2020-08-17 NOTE — ED TRIAGE NOTES
Patient arrives to ED via EMS from home. Patient had left hip replacement x 3 weeks ago. Patient was sitting in his chair and when he tried to stand up he fell to his knees. Patient has pain in his left hip. There is shortening and rotation. MD John at bedside.

## 2020-08-17 NOTE — ED NOTES
Per ed provider, preparing patient for conscious sedation and reduction of left acetabulum hip relocation. Consents and witnessed consents on chart. Patient on monitor iv established, airway patent, ambu bag present. On capnography. Patient AOX4 prior to procedure.

## 2020-08-17 NOTE — DISCHARGE INSTRUCTIONS
Patient Education        Dislocated Hip: Care Instructions  Your Care Instructions     Your hip is a large and fairly stable joint. Normally it takes a hard fall, a car accident, or something else of great force to make the thighbone slip out of its socket (dislocate). But if you have had hip replacement surgery, your hip can more easily slip out of position. This is more common during the first few months after the surgery. After your doctor puts your dislocated hip back into normal position, you will need to use a walking aid or hip brace for several weeks or months while the hip heals. You will need to follow special hip precautions to avoid dislocating your hip again. Your doctor may recommend exercises to strengthen the hip joint and your legs. Rest and home treatment can help you heal.  If your hip becomes dislocated again, contact your doctor. You will need to go to a hospital or clinic to have your hip put back in position. You may have had a sedative to help you relax. You may be unsteady after having sedation. It can take a few hours for the medicine's effects to wear off. Common side effects of sedation include nausea, vomiting, and feeling sleepy or tired. The doctor has checked you carefully, but problems can develop later. If you notice any problems or new symptoms, get medical treatment right away. Follow-up care is a key part of your treatment and safety. Be sure to make and go to all appointments, and call your doctor if you are having problems. It's also a good idea to know your test results and keep a list of the medicines you take. How can you care for yourself at home? · If the doctor gave you a sedative:  ? For 24 hours, don't do anything that requires attention to detail. This includes going to work, making important decisions, or signing any legal documents.  It takes time for the medicine's effects to completely wear off.  ? For your safety, do not drive or operate any machinery that could be dangerous. Wait until the medicine wears off and you can think clearly and react easily. · Your doctor will give you safety precautions to keep your hip centered in its socket during the healing period. Be sure to follow these precautions. ? Keep your knees and toes pointed forward when you sit in a chair, walk, or stand. ? Do not sit with your legs crossed. ? Do not bend at the waist more than 90º. Be careful when leaning or when moving in bed to keep your legs as straight ahead as possible. · If you have a hip brace, wear it as directed. Do not remove it unless your doctor says you can. If you remove the brace to shower, be extremely careful. Follow hip precautions to limit hip movement. · Rest your hip as much as you can. You will need to change your activities to avoid movements that irritate the hip. · If your hip is swollen, put ice or a cold pack on it for 10 to 20 minutes at a time. Try to do this every 1 to 2 hours for the next 3 days (when you are awake) or until the swelling goes down. Put a thin cloth between the ice and your skin. · Take your medicines exactly as prescribed. Call your doctor if you think you are having a problem with your medicine. · Ask your doctor if you can take an over-the-counter pain medicine, such as acetaminophen (Tylenol), ibuprofen (Advil, Motrin), or naproxen (Aleve). Be safe with medicines. Read and follow all instructions on the label. · If your doctor recommends exercises, do them as directed. When should you call for help? [id number] anytime you think you may need emergency care. For example, call if:  · You have chest pain, are short of breath, or cough up blood. · You have trouble breathing. · You passed out (lost consciousness). Call your doctor now or seek immediate medical care if:  · You have new or worse nausea or vomiting. · You have new or worse pain. · Your foot is cool or pale or changes color.   · You have tingling, weakness, or numbness in your foot or toes. · You have signs of a blood clot in your leg (called a deep vein thrombosis), such as:  ? Pain in your calf, back of the knee, thigh, or groin. ? Redness or swelling in your leg. Watch closely for changes in your health, and be sure to contact your doctor if:  · You do not get better as expected. Where can you learn more? Go to http://aries-samir.info/  Enter [id number] in the search box to learn more about \"Dislocated Hip: Care Instructions. \"  Current as of: March 2, 2020               Content Version: 12.5  © 4877-9666 [employer], [employer]. Care instructions adapted under license by [employer] (which disclaims liability or warranty for this information). If you have questions about a medical condition or this instruction, always ask your healthcare professional. [address] any warranty or liability for your use of this information.

## 2020-08-18 PROCEDURE — 3331090001 HH PPS REVENUE CREDIT

## 2020-08-18 PROCEDURE — 3331090002 HH PPS REVENUE DEBIT

## 2020-08-19 ENCOUNTER — HOME CARE VISIT (OUTPATIENT)
Dept: HOME HEALTH SERVICES | Facility: HOME HEALTH | Age: 70
End: 2020-08-19
Payer: MEDICARE

## 2020-08-19 VITALS
RESPIRATION RATE: 18 BRPM | SYSTOLIC BLOOD PRESSURE: 124 MMHG | TEMPERATURE: 98.2 F | HEART RATE: 82 BPM | DIASTOLIC BLOOD PRESSURE: 74 MMHG

## 2020-08-19 PROCEDURE — 3331090002 HH PPS REVENUE DEBIT

## 2020-08-19 PROCEDURE — 3331090001 HH PPS REVENUE CREDIT

## 2020-08-19 PROCEDURE — G0151 HHCP-SERV OF PT,EA 15 MIN: HCPCS

## 2020-08-20 PROCEDURE — 3331090002 HH PPS REVENUE DEBIT

## 2020-08-20 PROCEDURE — 3331090001 HH PPS REVENUE CREDIT

## 2020-08-21 PROCEDURE — 3331090001 HH PPS REVENUE CREDIT

## 2020-08-21 PROCEDURE — 3331090002 HH PPS REVENUE DEBIT

## 2020-08-22 PROCEDURE — 3331090002 HH PPS REVENUE DEBIT

## 2020-08-22 PROCEDURE — 3331090001 HH PPS REVENUE CREDIT

## 2020-08-23 PROCEDURE — 3331090002 HH PPS REVENUE DEBIT

## 2020-08-23 PROCEDURE — 3331090001 HH PPS REVENUE CREDIT

## 2020-08-24 PROCEDURE — 3331090002 HH PPS REVENUE DEBIT

## 2020-08-24 PROCEDURE — 3331090001 HH PPS REVENUE CREDIT

## 2020-08-25 PROCEDURE — 3331090002 HH PPS REVENUE DEBIT

## 2020-08-25 PROCEDURE — 3331090001 HH PPS REVENUE CREDIT

## 2020-08-26 PROCEDURE — 3331090001 HH PPS REVENUE CREDIT

## 2020-08-26 PROCEDURE — 3331090002 HH PPS REVENUE DEBIT

## 2020-08-27 PROCEDURE — 3331090002 HH PPS REVENUE DEBIT

## 2020-08-27 PROCEDURE — 3331090001 HH PPS REVENUE CREDIT

## 2020-08-28 PROCEDURE — 3331090002 HH PPS REVENUE DEBIT

## 2020-08-28 PROCEDURE — 3331090001 HH PPS REVENUE CREDIT

## 2020-08-29 PROCEDURE — 3331090002 HH PPS REVENUE DEBIT

## 2020-08-29 PROCEDURE — 3331090001 HH PPS REVENUE CREDIT

## 2020-08-30 PROCEDURE — 3331090002 HH PPS REVENUE DEBIT

## 2020-08-30 PROCEDURE — 3331090001 HH PPS REVENUE CREDIT

## 2020-08-31 PROCEDURE — 3331090001 HH PPS REVENUE CREDIT

## 2020-08-31 PROCEDURE — 3331090002 HH PPS REVENUE DEBIT

## 2020-09-01 PROCEDURE — 3331090002 HH PPS REVENUE DEBIT

## 2020-09-01 PROCEDURE — 3331090001 HH PPS REVENUE CREDIT

## 2020-09-02 PROCEDURE — 3331090001 HH PPS REVENUE CREDIT

## 2020-09-02 PROCEDURE — 3331090002 HH PPS REVENUE DEBIT

## 2020-09-03 PROCEDURE — 3331090002 HH PPS REVENUE DEBIT

## 2020-09-03 PROCEDURE — 3331090001 HH PPS REVENUE CREDIT

## 2020-09-04 PROCEDURE — 3331090002 HH PPS REVENUE DEBIT

## 2020-09-04 PROCEDURE — 3331090001 HH PPS REVENUE CREDIT

## 2020-09-05 PROCEDURE — 3331090002 HH PPS REVENUE DEBIT

## 2020-09-05 PROCEDURE — 3331090001 HH PPS REVENUE CREDIT

## 2020-09-06 PROCEDURE — 3331090002 HH PPS REVENUE DEBIT

## 2020-09-06 PROCEDURE — 3331090001 HH PPS REVENUE CREDIT

## 2020-09-07 PROCEDURE — 3331090001 HH PPS REVENUE CREDIT

## 2020-09-07 PROCEDURE — 3331090002 HH PPS REVENUE DEBIT

## 2020-09-08 PROCEDURE — 3331090002 HH PPS REVENUE DEBIT

## 2020-09-08 PROCEDURE — 3331090001 HH PPS REVENUE CREDIT

## 2020-09-09 PROCEDURE — 3331090001 HH PPS REVENUE CREDIT

## 2020-09-09 PROCEDURE — 3331090002 HH PPS REVENUE DEBIT

## 2020-09-10 ENCOUNTER — DOCUMENTATION ONLY (OUTPATIENT)
Dept: ORTHOPEDIC SURGERY | Age: 70
End: 2020-09-10

## 2020-09-10 ENCOUNTER — HOSPITAL ENCOUNTER (EMERGENCY)
Age: 70
Discharge: HOME | End: 2020-09-10
Attending: EMERGENCY MEDICINE
Payer: MEDICARE

## 2020-09-10 ENCOUNTER — APPOINTMENT (OUTPATIENT)
Dept: GENERAL RADIOLOGY | Age: 70
End: 2020-09-10
Attending: EMERGENCY MEDICINE
Payer: MEDICARE

## 2020-09-10 VITALS
RESPIRATION RATE: 18 BRPM | OXYGEN SATURATION: 96 % | SYSTOLIC BLOOD PRESSURE: 155 MMHG | DIASTOLIC BLOOD PRESSURE: 79 MMHG | BODY MASS INDEX: 28.79 KG/M2 | HEART RATE: 82 BPM | HEIGHT: 68 IN | TEMPERATURE: 98.8 F | WEIGHT: 190 LBS

## 2020-09-10 DIAGNOSIS — S73.005A HIP DISLOCATION, LEFT, INITIAL ENCOUNTER (HCC): Primary | ICD-10-CM

## 2020-09-10 PROCEDURE — 3331090002 HH PPS REVENUE DEBIT

## 2020-09-10 PROCEDURE — 73502 X-RAY EXAM HIP UNI 2-3 VIEWS: CPT

## 2020-09-10 PROCEDURE — 75810000301 HC ER LEVEL 1 CLOSED TREATMNT FRACTURE/DISLOCATION

## 2020-09-10 PROCEDURE — 74011250636 HC RX REV CODE- 250/636: Performed by: EMERGENCY MEDICINE

## 2020-09-10 PROCEDURE — 73552 X-RAY EXAM OF FEMUR 2/>: CPT

## 2020-09-10 PROCEDURE — 96374 THER/PROPH/DIAG INJ IV PUSH: CPT

## 2020-09-10 PROCEDURE — 99284 EMERGENCY DEPT VISIT MOD MDM: CPT

## 2020-09-10 PROCEDURE — 3331090001 HH PPS REVENUE CREDIT

## 2020-09-10 PROCEDURE — 96375 TX/PRO/DX INJ NEW DRUG ADDON: CPT

## 2020-09-10 RX ORDER — METOCLOPRAMIDE HYDROCHLORIDE 5 MG/ML
10 INJECTION INTRAMUSCULAR; INTRAVENOUS
Status: COMPLETED | OUTPATIENT
Start: 2020-09-10 | End: 2020-09-10

## 2020-09-10 RX ORDER — HYDROCODONE BITARTRATE AND ACETAMINOPHEN 5; 325 MG/1; MG/1
1-2 TABLET ORAL
Qty: 12 TAB | Refills: 0 | Status: SHIPPED | OUTPATIENT
Start: 2020-09-10 | End: 2020-09-13

## 2020-09-10 RX ORDER — HYDROMORPHONE HYDROCHLORIDE 1 MG/ML
1 INJECTION, SOLUTION INTRAMUSCULAR; INTRAVENOUS; SUBCUTANEOUS
Status: COMPLETED | OUTPATIENT
Start: 2020-09-10 | End: 2020-09-10

## 2020-09-10 RX ORDER — PROPOFOL 10 MG/ML
200 INJECTION, EMULSION INTRAVENOUS
Status: COMPLETED | OUTPATIENT
Start: 2020-09-10 | End: 2020-09-10

## 2020-09-10 RX ADMIN — SODIUM CHLORIDE 1000 ML: 900 INJECTION, SOLUTION INTRAVENOUS at 15:15

## 2020-09-10 RX ADMIN — PROPOFOL 75 MG: 10 INJECTION, EMULSION INTRAVENOUS at 15:17

## 2020-09-10 RX ADMIN — METOCLOPRAMIDE 10 MG: 5 INJECTION, SOLUTION INTRAMUSCULAR; INTRAVENOUS at 14:24

## 2020-09-10 RX ADMIN — HYDROMORPHONE HYDROCHLORIDE 1 MG: 1 INJECTION, SOLUTION INTRAMUSCULAR; INTRAVENOUS; SUBCUTANEOUS at 14:24

## 2020-09-10 NOTE — CONSULTS
[facility]  Consultation Note    Patient ID:  Name: Mayra Murcia  MRN: [id number]  AGE: 69 y.o.  : 1950    Date of Consultation:  September 10, 2020  Referring Physician:  Emergency Dept. Subjective: Pt complains of dislocated left total hip replacement. He had a left SUSANA done by Dr. Komal Palomino on . About 3 weeks ago the patient reports that he was getting out of a low chair and dislocated his hip the first time since surgery. Today he was walking up a hill and says his hip just gave out on him and he fell dislocating it gain. He says his pain is not too bad. He denies any other injuries. Past Medical History Includes:   Past Medical History:   Diagnosis Date    Anemia     Anxiety     Arthritis     OA    Chronic pain     headaches    Cigar smoker     GERD (gastroesophageal reflux disease)     controlled with medication    History of colon cancer     Malignant neoplasm of ascending colon (HCC)     PUD (peptic ulcer disease)     Rectal hemorrhage     Weakness of right upper extremity    ,   Past Surgical History:   Procedure Laterality Date    HX CATARACT REMOVAL Bilateral 2017    HX CERVICAL FUSION      HX COLECTOMY Right 2019    HX COLONOSCOPY      HX HEENT  1988    acoustic neuroma    HX LUMBAR FUSION  1992     Family History:   Family History   Problem Relation Age of Onset    Cancer Mother         breast    Diabetes Mother     Hypertension Mother     Heart Attack Father     Hypertension Brother       Social History:   Social History     Tobacco Use    Smoking status: Current Some Day Smoker     Types: Cigars    Smokeless tobacco: Former User     Quit date:     Tobacco comment: occ. cigar   Substance Use Topics    Alcohol use:  Yes     Alcohol/week: 5.0 - 6.0 standard drinks     Types: 5 - 6 Cans of beer per week       ALLERGIES: No Known Allergies     Patient Medications    Current Facility-Administered Medications   Medication Dose Route Frequency    sodium chloride 0.9 % bolus infusion 1,000 mL  1,000 mL IntraVENous ONCE     Current Outpatient Medications   Medication Sig    sertraline (ZOLOFT) 100 mg tablet Take 200 mg by mouth nightly.  HYDROmorphone (DILAUDID) 2 mg tablet Take 2 mg by mouth every four (4) hours as needed for Pain.  docusate sodium (DULCOLAX STOOL SOFTENER, DSS, PO) Take 1 Tab by mouth as needed (constipation). 1-3 tabs a day    acetaminophen (TYLENOL) 500 mg tablet Take 2 Tabs by mouth every six (6) hours as needed for Pain.  aspirin delayed-release 81 mg tablet Take 1 Tab by mouth two (2) times a day.  gabapentin (NEURONTIN) 100 mg capsule Take 1 Cap by mouth two (2) times a day.  cyclobenzaprine (FLEXERIL) 5 mg tablet Take 1 Tab by mouth three (3) times daily as needed for Muscle Spasm(s).  meloxicam (MOBIC) 7.5 mg tablet Take 1 Tab by mouth two (2) times a day.  ondansetron (ZOFRAN ODT) 8 mg disintegrating tablet Take 1 Tab by mouth every eight (8) hours as needed for Nausea or Vomiting.  senna-docusate (PERICOLACE) 8.6-50 mg per tablet Take 1 Tab by mouth daily.  multivitamin (ONE A DAY) tablet Take 1 Tab by mouth daily.  cyanocobalamin 1,000 mcg tablet Take 1,000 mcg by mouth daily.  omeprazole (PRILOSEC) 20 mg capsule Take 40 mg by mouth. Review of Systems:  Pertinent items are noted in HPI. Physical Exam:      General: NAD, Alert, Oriented x 3   Mental Status: Appropriate   Psych: Normal Affect, Normal Mood    HEENT: Normal Cephalic/Atraumatic, PERRL   Lungs: Respirations even and unlabored, Breath Sounds were clear, no respiratory distress   Heart: Regular Rate and Rhythm   Vascular: Distal pulses intact, good capillary refill   Skin: No redness, No Rashes, Skin is dry   Musculoskeletal: exam of both lower extremities reveal shorten and externally rotated left hip. Distal pulses intact.  NV exam normal.    Lymphatic: No lympahdenopathy, No distal edema   Neuro: No gross deficits   Abdomen: Soft, Non tender, No distension      VITALS:   Patient Vitals for the past 8 hrs:   BP Temp Pulse Resp SpO2 Height Weight   09/10/20 1522 149/77  85 18 99 %     09/10/20 1510 157/84  88 18 95 %     09/10/20 1500 157/84    93 %     09/10/20 1352 (!) 196/103 98.8 °F (37.1 °C) 89 18 97 % 5' 8\" (1.727 m) 86.2 kg (190 lb)    , Temp (24hrs), Av.8 °F (37.1 °C), Min:98.8 °F (37.1 °C), Max:98.8 °F (37.1 °C)         X-ray: reviewed on EMR    Diagnosis   Patient Active Problem List   Diagnosis Code    Malignant neoplasm of ascending colon ([facility] [address] C18.2    Colon cancer (HCC) C18.9    Osteoarthritis of left hip M16.12          Assessment and Plan:     Dislocated left SUSANA. I discussed with Dr. Solitario Schwartz over the phone and will attempt closed reduction. Patient to be placed in a knee immobilizer and follow up with Dr. Solitario Schwartz next week.      KEITH Rodriguez  9/10/2020,  3:22 PM

## 2020-09-10 NOTE — ED NOTES
I have reviewed discharge instructions with the patient. The patient verbalized understanding. Patient left ED via Discharge Method: wheelchair to Home with daughter. Opportunity for questions and clarification provided. Patient given 1 scripts. To continue your aftercare when you leave the hospital, you may receive an automated call from our care team to check in on how you are doing. This is a free service and part of our promise to provide the best care and service to meet your aftercare needs.  If you have questions, or wish to unsubscribe from this service please call [phone]. Thank you for Choosing our [facility] Department.

## 2020-09-10 NOTE — PROCEDURES
Patient:Torito Garrett   : 1950  Medical Record [id number]      Location: [address] [place] [address]  Provider: KEITH Noguera    Procedure: Closed reduction of dislocated total hip prosthesis. After a timeout confirming the procedure the patient was sedated by the Emergency Medicine physician. The dislocated left hip was then reduced using flexion traction and external rotation. The hip reduced very easily. Sedation was stopped. Post reduction x-rays were taken confirming reduction. And the patient was placed in a knee immobilizer. Patient was discharged in good condition and had understood hip precautions.      Signed By: KEITH Noguera  9/10/2020,  4:44 PM

## 2020-09-10 NOTE — ED TRIAGE NOTES
Pt presents to ED with left hip pain. States he believes it is dislocated. Recently had hip replacement a little over a month ago and this is the second time it has done this. Pt left foot is turned inward. Mask on pt prior to triage.

## 2020-09-10 NOTE — ED PROVIDER NOTES
56-year-old gentleman presents to the ER with hip pain he was out walking and felt his new left prosthetic hip popped out as he was walking uphill. He called home, crawled into his truck, and drove here under his own power. Underwent total left hip arthroplasty on July 29. He is already suffered 1 dislocation on August 16 when he had been seated for a protracted period, and then tried to stand up. No other injury other than the hip, patient had no loss of consciousness, and denies numbness to the foot or leg. He has not eaten breakfast or lunch today effectively n.p.o. since last night.            Past Medical History:   Diagnosis Date    Anemia     Anxiety     Arthritis     OA    Chronic pain     headaches    Cigar smoker     GERD (gastroesophageal reflux disease)     controlled with medication    History of colon cancer     Malignant neoplasm of ascending colon (HCC)     PUD (peptic ulcer disease) 1979    Rectal hemorrhage     Weakness of right upper extremity        Past Surgical History:   Procedure Laterality Date    HX CATARACT REMOVAL Bilateral 2017    HX CERVICAL FUSION  2013    HX COLECTOMY Right 07/2019    HX COLONOSCOPY      HX HEENT  1988    acoustic neuroma    HX LUMBAR FUSION  1992         Family History:   Problem Relation Age of Onset    Cancer Mother         breast    Diabetes Mother     Hypertension Mother     Heart Attack Father     Hypertension Brother        Social History     Socioeconomic History    Marital status:      Spouse name: Not on file    Number of children: Not on file    Years of education: Not on file    Highest education level: Not on file   Occupational History    Not on file   Social Needs    Financial resource strain: Not on file    Food insecurity     Worry: Not on file     Inability: Not on file    Transportation needs     Medical: Not on file     Non-medical: Not on file   Tobacco Use    Smoking status: Current Some Day Smoker Types: Cigars    Smokeless tobacco: Former User     Quit date: 2018    Tobacco comment: occ. cigar   Substance and Sexual Activity    Alcohol use: Yes     Alcohol/week: 5.0 - 6.0 standard drinks     Types: 5 - 6 Cans of beer per week    Drug use: Never    Sexual activity: Not on file   Lifestyle    Physical activity     Days per week: Not on file     Minutes per session: Not on file    Stress: Not on file   Relationships    Social connections     Talks on phone: Not on file     Gets together: Not on file     Attends Sabianism service: Not on file     Active member of club or organization: Not on file     Attends meetings of clubs or organizations: Not on file     Relationship status: Not on file    Intimate partner violence     Fear of current or ex partner: Not on file     Emotionally abused: Not on file     Physically abused: Not on file     Forced sexual activity: Not on file   Other Topics Concern    Not on file   Social History Narrative    Not on file         ALLERGIES: Patient has no known allergies. Review of Systems   HENT: Negative for dental problem and nosebleeds. Eyes: Negative for pain and redness. Respiratory: Negative for shortness of breath and stridor. Cardiovascular: Negative for chest pain. Gastrointestinal: Negative for abdominal pain, nausea and vomiting. Genitourinary: Negative for difficulty urinating and hematuria. Musculoskeletal: Positive for arthralgias and gait problem. Negative for back pain, joint swelling, myalgias, neck pain and neck stiffness. Skin: Negative for pallor and wound. Neurological: Negative for dizziness, syncope, weakness, numbness and headaches. All other systems reviewed and are negative.       Vitals:    09/10/20 1510 09/10/20 1522 09/10/20 1525 09/10/20 1526   BP: 157/84 149/77 148/77 148/77   Pulse: 88 85 84 85   Resp: 18 18 16 18   Temp:       SpO2: 95% 99% 100% 97%   Weight:       Height:                Physical Exam  Vitals signs and nursing note reviewed. Constitutional:       General: He is not in acute distress. Appearance: Normal appearance. He is well-developed. He is not ill-appearing, toxic-appearing or diaphoretic. HENT:      Head: Normocephalic and atraumatic. Eyes:      General: No scleral icterus. Right eye: No discharge. Left eye: No discharge. Conjunctiva/sclera: Conjunctivae normal.      Pupils: Pupils are equal, round, and reactive to light. Neck:      Musculoskeletal: Normal range of motion and neck supple. Cardiovascular:      Rate and Rhythm: Normal rate and regular rhythm. Heart sounds: Normal heart sounds. No murmur. No gallop. Pulmonary:      Effort: Pulmonary effort is normal. No respiratory distress. Breath sounds: Normal breath sounds. No wheezing or rales. Abdominal:      General: Bowel sounds are normal.      Palpations: Abdomen is soft. Tenderness: There is no abdominal tenderness. There is no guarding. Musculoskeletal:         General: Deformity and signs of injury present. Left hip: He exhibits decreased range of motion, tenderness and deformity. Legs:    Skin:     General: Skin is warm and dry. Neurological:      General: No focal deficit present. Mental Status: He is alert and oriented to person, place, and time. Mental status is at baseline. Motor: No abnormal muscle tone. Comments: cni 2-12 grossly   Psychiatric:         Mood and Affect: Mood normal.         Behavior: Behavior normal.          MDM  Number of Diagnoses or Management Options  Hip dislocation, left, initial encounter [facility]):   Diagnosis management comments: Medical decision making note:  Prosthetic hip dislocation, second time in a month. Hip less than 2 months old. Conscious sedation by ER physician,  Traction by orthopedic PA  satisfactory films  This concludes the \"medical decision making note\" part of this emergency department visit note. Procedural Sedation    Date/Time: 9/10/2020 3:48 PM  Performed by: Ariella Samuel MD  Authorized by: Ariella Samuel MD     Consent:     Consent obtained:  Written    Consent given by:  Patient    Risks discussed:  Prolonged hypoxia resulting in organ damage, inadequate sedation and respiratory compromise necessitating ventilatory assistance and intubation  Indications:     Procedure performed:  Dislocation reduction    Procedure necessitating sedation performed by:  Different physician    Intended level of sedation:  Moderate (conscious sedation)  Pre-sedation assessment:     Time since last food or drink:  12 hours    ASA classification: class 2 - patient with mild systemic disease      Neck mobility: normal      Mouth opening:  3 or more finger widths    Thyromental distance:  4 finger widths    Mallampati score:  II - soft palate, uvula, fauces visible    Pre-sedation assessments completed and reviewed: airway patency, mental status and respiratory function      History of difficult intubation: no    Immediate pre-procedure details:     Reassessment: Patient reassessed immediately prior to procedure    Procedure details (see MAR for exact dosages):     Sedation start time:  9/10/2020 3:15 PM    Preoxygenation:  Room air and nasal cannula    Sedation:  Propofol (75mg)    Intra-procedure monitoring:  Blood pressure monitoring, cardiac monitor, continuous capnometry, continuous pulse oximetry, frequent LOC assessments and frequent vital sign checks    Intra-procedure events: none      Sedation end time:  9/10/2020 3:23 PM  Post-procedure details:     Post-sedation assessment completed:  9/10/2020 3:51 PM    Attendance: Constant attendance by certified staff until patient recovered      Recovery: Patient returned to pre-procedure baseline      Estimated blood loss (see I/O flowsheets): no      Post-sedation assessments completed and reviewed: airway patency, cardiovascular function, mental status and respiratory function      Specimens recovered:  None    Patient is stable for discharge or admission: yes      Patient tolerance:   Tolerated well, no immediate complications  Comments:      Time out called at 15:10

## 2020-09-10 NOTE — DISCHARGE INSTRUCTIONS
Norco as needed for pain  Keep knee immobilizer on  Follow-up with Dr. Shira Sarabia    Patient Education        Dislocated Hip: Care Instructions  Your Care Instructions     Your hip is a large and fairly stable joint. Normally it takes a hard fall, a car accident, or something else of great force to make the thighbone slip out of its socket (dislocate). But if you have had hip replacement surgery, your hip can more easily slip out of position. This is more common during the first few months after the surgery. After your doctor puts your dislocated hip back into normal position, you will need to use a walking aid or hip brace for several weeks or months while the hip heals. You will need to follow special hip precautions to avoid dislocating your hip again. Your doctor may recommend exercises to strengthen the hip joint and your legs. Rest and home treatment can help you heal.  If your hip becomes dislocated again, contact your doctor. You will need to go to a hospital or clinic to have your hip put back in position. You may have had a sedative to help you relax. You may be unsteady after having sedation. It can take a few hours for the medicine's effects to wear off. Common side effects of sedation include nausea, vomiting, and feeling sleepy or tired. The doctor has checked you carefully, but problems can develop later. If you notice any problems or new symptoms, get medical treatment right away. Follow-up care is a key part of your treatment and safety. Be sure to make and go to all appointments, and call your doctor if you are having problems. It's also a good idea to know your test results and keep a list of the medicines you take. How can you care for yourself at home? · If the doctor gave you a sedative:  ? For 24 hours, don't do anything that requires attention to detail. This includes going to work, making important decisions, or signing any legal documents.  It takes time for the medicine's effects to completely wear off.  ? For your safety, do not drive or operate any machinery that could be dangerous. Wait until the medicine wears off and you can think clearly and react easily. · Your doctor will give you safety precautions to keep your hip centered in its socket during the healing period. Be sure to follow these precautions. ? Keep your knees and toes pointed forward when you sit in a chair, walk, or stand. ? Do not sit with your legs crossed. ? Do not bend at the waist more than 90º. Be careful when leaning or when moving in bed to keep your legs as straight ahead as possible. · If you have a hip brace, wear it as directed. Do not remove it unless your doctor says you can. If you remove the brace to shower, be extremely careful. Follow hip precautions to limit hip movement. · Rest your hip as much as you can. You will need to change your activities to avoid movements that irritate the hip. · If your hip is swollen, put ice or a cold pack on it for 10 to 20 minutes at a time. Try to do this every 1 to 2 hours for the next 3 days (when you are awake) or until the swelling goes down. Put a thin cloth between the ice and your skin. · Take your medicines exactly as prescribed. Call your doctor if you think you are having a problem with your medicine. · Ask your doctor if you can take an over-the-counter pain medicine, such as acetaminophen (Tylenol), ibuprofen (Advil, Motrin), or naproxen (Aleve). Be safe with medicines. Read and follow all instructions on the label. · If your doctor recommends exercises, do them as directed. When should you call for help? Call 911 anytime you think you may need emergency care. For example, call if:    · You have chest pain, are short of breath, or cough up blood.     · You have trouble breathing.     · You passed out (lost consciousness).    Call your doctor now or seek immediate medical care if:    · You have new or worse nausea or vomiting.     · You have new or worse pain.     · Your foot is cool or pale or changes color.     · You have tingling, weakness, or numbness in your foot or toes.     · You have signs of a blood clot in your leg (called a deep vein thrombosis), such as:  ? Pain in your calf, back of the knee, thigh, or groin. ? Redness or swelling in your leg. Watch closely for changes in your health, and be sure to contact your doctor if:    · You do not get better as expected. Where can you learn more? Go to http://aries-samir.info/  Enter [id number] in the search box to learn more about \"Dislocated Hip: Care Instructions. \"  Current as of: March 2, 2020               Content Version: 12.6  © 2094-9555 [employer]. Care instructions adapted under license by [employer] (which disclaims liability or warranty for this information). If you have questions about a medical condition or this instruction, always ask your healthcare professional. [place] [address] any warranty or liability for your use of this information.

## 2020-09-11 PROCEDURE — 3331090002 HH PPS REVENUE DEBIT

## 2020-09-11 PROCEDURE — 3331090001 HH PPS REVENUE CREDIT

## 2020-09-12 PROCEDURE — 3331090001 HH PPS REVENUE CREDIT

## 2020-09-12 PROCEDURE — 3331090002 HH PPS REVENUE DEBIT

## 2020-09-13 PROCEDURE — 3331090002 HH PPS REVENUE DEBIT

## 2020-09-13 PROCEDURE — 3331090001 HH PPS REVENUE CREDIT

## 2020-09-14 PROCEDURE — 3331090002 HH PPS REVENUE DEBIT

## 2020-09-14 PROCEDURE — 3331090001 HH PPS REVENUE CREDIT

## 2020-09-15 PROCEDURE — 3331090002 HH PPS REVENUE DEBIT

## 2020-09-15 PROCEDURE — 3331090001 HH PPS REVENUE CREDIT

## 2020-09-16 ENCOUNTER — HOSPITAL ENCOUNTER (OUTPATIENT)
Dept: SURGERY | Age: 70
Discharge: HOME | End: 2020-09-16
Payer: MEDICARE

## 2020-09-16 VITALS
RESPIRATION RATE: 18 BRPM | HEIGHT: 68 IN | HEART RATE: 105 BPM | DIASTOLIC BLOOD PRESSURE: 85 MMHG | BODY MASS INDEX: 29.4 KG/M2 | OXYGEN SATURATION: 95 % | WEIGHT: 194 LBS | SYSTOLIC BLOOD PRESSURE: 162 MMHG | TEMPERATURE: 98.8 F

## 2020-09-16 LAB
ANION GAP SERPL CALC-SCNC: 6 MMOL/L (ref 7–16)
ATRIAL RATE: 105 BPM
BACTERIA SPEC CULT: ABNORMAL
BUN SERPL-MCNC: 19 MG/DL (ref 8–23)
CALCIUM SERPL-MCNC: 9 MG/DL (ref 8.3–10.4)
CALCULATED P AXIS, ECG09: 29 DEGREES
CALCULATED R AXIS, ECG10: 0 DEGREES
CALCULATED T AXIS, ECG11: 17 DEGREES
CHLORIDE SERPL-SCNC: 104 MMOL/L (ref 98–107)
CO2 SERPL-SCNC: 28 MMOL/L (ref 21–32)
CREAT SERPL-MCNC: 0.95 MG/DL (ref 0.8–1.5)
DIAGNOSIS, 93000: NORMAL
ERYTHROCYTE [DISTWIDTH] IN BLOOD BY AUTOMATED COUNT: 14.6 % (ref 11.9–14.6)
GLUCOSE SERPL-MCNC: 135 MG/DL (ref 65–100)
HCT VFR BLD AUTO: 38.7 % (ref 41.1–50.3)
HGB BLD-MCNC: 12.6 G/DL (ref 13.6–17.2)
MCH RBC QN AUTO: 26.9 PG (ref 26.1–32.9)
MCHC RBC AUTO-ENTMCNC: 32.6 G/DL (ref 31.4–35)
MCV RBC AUTO: 82.7 FL (ref 79.6–97.8)
NRBC # BLD: 0 K/UL (ref 0–0.2)
P-R INTERVAL, ECG05: 142 MS
PLATELET # BLD AUTO: 155 K/UL (ref 150–450)
PMV BLD AUTO: 8.2 FL (ref 9.4–12.3)
POTASSIUM SERPL-SCNC: 4.3 MMOL/L (ref 3.5–5.1)
Q-T INTERVAL, ECG07: 328 MS
QRS DURATION, ECG06: 86 MS
QTC CALCULATION (BEZET), ECG08: 433 MS
RBC # BLD AUTO: 4.68 M/UL (ref 4.23–5.6)
SERVICE CMNT-IMP: ABNORMAL
SODIUM SERPL-SCNC: 138 MMOL/L (ref 136–145)
VENTRICULAR RATE, ECG03: 105 BPM
WBC # BLD AUTO: 4.7 K/UL (ref 4.3–11.1)

## 2020-09-16 PROCEDURE — 3331090002 HH PPS REVENUE DEBIT

## 2020-09-16 PROCEDURE — 80048 BASIC METABOLIC PNL TOTAL CA: CPT

## 2020-09-16 PROCEDURE — 3331090001 HH PPS REVENUE CREDIT

## 2020-09-16 PROCEDURE — 85027 COMPLETE CBC AUTOMATED: CPT

## 2020-09-16 PROCEDURE — 87641 MR-STAPH DNA AMP PROBE: CPT

## 2020-09-16 NOTE — PERIOP NOTES
Patient verified name and . Order for consent NOT found in EHR, unable to confirm case posting; patient verified. Type 3 surgery, PAT walk-in joint assessment complete. Labs per surgeon: No orders received at time of PAT appointment. Labs per anesthesia protocol: cbc, bmp; results pending. T&S DOS; order signed and held in EHR. EKG: per anesthesia protocol EKG was repeated due to patient having irregular heartbeat; results to be reviewed by anesthesia-charge nurse to f/u. Patient had lumbar fusion in . Patient unable to remember where procedure was performed and states no imaging has been completed since. Patient has incentive spirometer from previous joint camp. Instructions on incentive spirometer use reviewed with patient and patient instructed to bring incentive spirometer on the DOS. Patient aware that a negative Covid swab result is required to proceed with surgery; appointments are made by the surgeon office and should test should be collected 7 days prior to surgery. The testing center is located at the [address] [place] [address],[address] swab completed prior to PAT appointment. MRSA/MSSA swab collected; pharmacy to review and dose antibiotic as appropriate. Hospital approved surgical skin cleanser and instructions to return bottle on DOS given per hospital policy. Patient provided with handouts including Guide to Surgery, Pain Management, Hand Hygiene, Blood Transfusion Education, and [facility] Brochure. Patient answered medical/surgical history questions at their best of ability. All prior to admission medications documented in [facility]. Original medication prescription bottle NOT visualized during patient appointment. Patient instructed to hold all vitamins, supplements, herbals 3 weeks prior to surgery and NSAIDS 5 days prior to surgery.      Patient teach back successful and patient demonstrates knowledge of instruction.

## 2020-09-16 NOTE — PERIOP NOTES
PLEASE CONTINUE TAKING ALL PRESCRIPTION MEDICATIONS UP TO THE DAY OF SURGERY UNLESS OTHERWISE DIRECTED BELOW. DISCONTINUE all vitamins, herbals and supplements 21 days prior to surgery. DISCONTINUE Non-Steriodal Anti-Inflammatory (NSAIDS) such as Advil, Ibuprofen, and Aleve 5 days prior to surgery. Home Medications to HOLD      All vitamins, supplements, and herbals stop NOW. All NSAIDs such as Advil, Aleve, Ibuprofen, Meloxicam, Diclofenac, Naproxen, etc. Stop 5 days prior to surgery. Home Medications to take  the day of surgery   Omeprazole, Gabapentin, Zofran if needed, Flexeril if needed         Comments   Bring: Omeprazole, bottle of soap (Dynahex), and incentive spirometer to the hospital on the day of surgery. *Ok to take Tylenol up until the day of surgery*          Please do not bring home medications with you on the day of surgery unless otherwise directed by your nurse. If you are instructed to bring home medications, please give them to your nurse as they will be administered by the nursing staff. If you have any questions, please call [facility] [phone] or [facility] [phone]. Copy of above instructions given to patient.

## 2020-09-16 NOTE — PERIOP NOTES
Labs dated 9/16/20 routed via [address] [place] [address] to patients PCP, Dr. Freddie Buck. All lab results listed below are within anesthesia limits, no further action is required.      Recent Results (from the past 12 hour(s))   EKG, 12 LEAD, INITIAL    Collection Time: 09/16/20  1:02 PM   Result Value Ref Range    Ventricular Rate 105 BPM    Atrial Rate 105 BPM    P-R Interval 142 ms    QRS Duration 86 ms    Q-T Interval 328 ms    QTC Calculation (Bezet) 433 ms    Calculated P Axis 29 degrees    Calculated R Axis 0 degrees    Calculated T Axis 17 degrees    Diagnosis       Sinus tachycardia  Minimal voltage criteria for LVH, may be normal variant  Inferior infarct (cited on or before 06-JUL-2020)  Possible Anterior infarct (cited on or before 06-JUL-2020)  Abnormal ECG  When compared with ECG of 06-JUL-2020 11:11,  No significant change was found  Confirmed by ANYA ZAMORA (), ALBERTO BRUNO ([id number]) on 9/16/2020 2:54:12 PM     CBC W/O DIFF    Collection Time: 09/16/20  2:11 PM   Result Value Ref Range    WBC 4.7 4.3 - 11.1 K/uL    RBC 4.68 4.23 - 5.6 M/uL    HGB 12.6 (L) 13.6 - 17.2 g/dL    HCT 38.7 (L) 41.1 - 50.3 %    MCV 82.7 79.6 - 97.8 FL    MCH 26.9 26.1 - 32.9 PG    MCHC 32.6 31.4 - 35.0 g/dL    RDW 14.6 11.9 - 14.6 %    PLATELET 760 060 - 729 K/uL    MPV 8.2 (L) 9.4 - 12.3 FL    ABSOLUTE NRBC 0.00 0.0 - 0.2 K/uL   METABOLIC PANEL, BASIC    Collection Time: 09/16/20  2:11 PM   Result Value Ref Range    Sodium 138 136 - 145 mmol/L    Potassium 4.3 3.5 - 5.1 mmol/L    Chloride 104 98 - 107 mmol/L    CO2 28 21 - 32 mmol/L    Anion gap 6 (L) 7 - 16 mmol/L    Glucose 135 (H) 65 - 100 mg/dL    BUN 19 8 - 23 MG/DL    Creatinine 0.95 0.8 - 1.5 MG/DL    GFR est AA >60 >60 ml/min/1.73m2    GFR est non-AA >60 >60 ml/min/1.73m2    Calcium 9.0 8.3 - 10.4 MG/DL

## 2020-09-16 NOTE — PERIOP NOTES
Recent Results (from the past 12 hour(s))   EKG, 12 LEAD, INITIAL    Collection Time: 09/16/20  1:02 PM   Result Value Ref Range    Ventricular Rate 105 BPM    Atrial Rate 105 BPM    P-R Interval 142 ms    QRS Duration 86 ms    Q-T Interval 328 ms    QTC Calculation (Bezet) 433 ms    Calculated P Axis 29 degrees    Calculated R Axis 0 degrees    Calculated T Axis 17 degrees    Diagnosis       Sinus tachycardia  Minimal voltage criteria for LVH, may be normal variant  Inferior infarct (cited on or before 06-JUL-2020)  Possible Anterior infarct (cited on or before 06-JUL-2020)  Abnormal ECG  When compared with ECG of 06-JUL-2020 11:11,  No significant change was found  Confirmed by ANYA ZAMORA (), ALBERTO BRUNO ([id number]) on 9/16/2020 2:54:12 PM     CBC W/O DIFF    Collection Time: 09/16/20  2:11 PM   Result Value Ref Range    WBC 4.7 4.3 - 11.1 K/uL    RBC 4.68 4.23 - 5.6 M/uL    HGB 12.6 (L) 13.6 - 17.2 g/dL    HCT 38.7 (L) 41.1 - 50.3 %    MCV 82.7 79.6 - 97.8 FL    MCH 26.9 26.1 - 32.9 PG    MCHC 32.6 31.4 - 35.0 g/dL    RDW 14.6 11.9 - 14.6 %    PLATELET 746 767 - 098 K/uL    MPV 8.2 (L) 9.4 - 12.3 FL    ABSOLUTE NRBC 0.00 0.0 - 0.2 K/uL   METABOLIC PANEL, BASIC    Collection Time: 09/16/20  2:11 PM   Result Value Ref Range    Sodium 138 136 - 145 mmol/L    Potassium 4.3 3.5 - 5.1 mmol/L    Chloride 104 98 - 107 mmol/L    CO2 28 21 - 32 mmol/L    Anion gap 6 (L) 7 - 16 mmol/L    Glucose 135 (H) 65 - 100 mg/dL    BUN 19 8 - 23 MG/DL    Creatinine 0.95 0.8 - 1.5 MG/DL    GFR est AA >60 >60 ml/min/1.73m2    GFR est non-AA >60 >60 ml/min/1.73m2    Calcium 9.0 8.3 - 10.4 MG/DL

## 2020-09-17 PROCEDURE — 3331090002 HH PPS REVENUE DEBIT

## 2020-09-17 PROCEDURE — 3331090001 HH PPS REVENUE CREDIT

## 2020-09-18 PROCEDURE — 3331090002 HH PPS REVENUE DEBIT

## 2020-09-18 PROCEDURE — 3331090001 HH PPS REVENUE CREDIT

## 2020-09-19 PROCEDURE — 3331090002 HH PPS REVENUE DEBIT

## 2020-09-19 PROCEDURE — 3331090001 HH PPS REVENUE CREDIT

## 2020-09-20 PROCEDURE — 3331090001 HH PPS REVENUE CREDIT

## 2020-09-20 PROCEDURE — 3331090002 HH PPS REVENUE DEBIT

## 2020-09-21 PROCEDURE — 3331090002 HH PPS REVENUE DEBIT

## 2020-09-21 PROCEDURE — 3331090001 HH PPS REVENUE CREDIT

## 2020-09-21 NOTE — ADVANCED PRACTICE NURSE
Total Joint Surgery Preoperative Chart Review      Patient ID:  Partha Jain  [id number]  82 y.o.  1950  Surgeon: Dr. Mendy Fletcher  Date of Surgery: 9/23/2020  Procedure: Total Left Hip Arthroplasty  Primary Care Physician: Marielle Weston [phone]  Specialty Physician(s):      Subjective:   Partha Jain is a 69 y.o. WHITE OR  male who presents for preoperative evaluation for Total Left Hip arthroplasty. This is a preoperative chart review note based on data collected by the nurse at the surgical Pre-Assessment visit. Past Medical History:   Diagnosis Date    Anemia     Anxiety     Arthritis     OA    Chronic pain     headaches    Cigar smoker     history     GERD (gastroesophageal reflux disease)     controlled with medication    History of colon cancer     Malignant neoplasm of ascending colon (HCC)     PUD (peptic ulcer disease) 1979    Rectal hemorrhage     Sinus tachycardia     Weakness of right upper extremity       Past Surgical History:   Procedure Laterality Date    HX CATARACT REMOVAL Bilateral 2017    HX CERVICAL FUSION  2013    HX COLECTOMY Right 07/2019    HX COLONOSCOPY      HX HEENT  1988    acoustic neuroma    HX HIP REPLACEMENT Left 07/2020    HX LUMBAR FUSION  1992     Family History   Problem Relation Age of Onset    Cancer Mother         breast    Diabetes Mother     Hypertension Mother     Heart Attack Father     Hypertension Brother       Social History     Tobacco Use    Smoking status: Former Smoker     Types: Cigars    Smokeless tobacco: Former User     Quit date: 2018    Tobacco comment: occ. cigar   Substance Use Topics    Alcohol use: Not Currently     Alcohol/week: 5.0 - 6.0 standard drinks     Types: 5 - 6 Cans of beer per week       Prior to Admission medications    Medication Sig Start Date End Date Taking? Authorizing Provider   sertraline (ZOLOFT) 100 mg tablet Take 200 mg by mouth nightly.    Yes Provider, Historical docusate sodium (DULCOLAX STOOL SOFTENER, DSS, PO) Take 1 Tab by mouth as needed (constipation). 1-3 tabs a day   Yes Provider, Historical   aspirin delayed-release 81 mg tablet Take 1 Tab by mouth two (2) times a day. Patient taking differently: Take 81 mg by mouth nightly. 7/29/20  Yes Mayco Ham MD   gabapentin (NEURONTIN) 100 mg capsule Take 1 Cap by mouth two (2) times a day. 7/29/20  Yes Mayco Ham MD   cyclobenzaprine (FLEXERIL) 5 mg tablet Take 1 Tab by mouth three (3) times daily as needed for Muscle Spasm(s). 7/29/20  Yes Mayco Ham MD   ondansetron (ZOFRAN ODT) 8 mg disintegrating tablet Take 1 Tab by mouth every eight (8) hours as needed for Nausea or Vomiting. 7/29/20  Yes Mayco Ham MD   senna-docusate (PERICOLACE) 8.6-50 mg per tablet Take 1 Tab by mouth daily. 7/29/20  Yes Mayco Ham MD   multivitamin (ONE A DAY) tablet Take 1 Tab by mouth daily. Yes Provider, Historical   cyanocobalamin 1,000 mcg tablet Take 1,000 mcg by mouth daily. Yes Provider, Historical   omeprazole (PRILOSEC) 20 mg capsule Take 40 mg by mouth. Yes Provider, Historical   acetaminophen (TYLENOL) 500 mg tablet Take 2 Tabs by mouth every six (6) hours as needed for Pain. 7/29/20   Mayco Ham MD     No Known Allergies       Objective:     Physical Exam:   No data found.     ECG:    EKG Results     Procedure 720 Value Units Date/Time    EKG, 12 LEAD, INITIAL [[id number]] Collected:  09/16/20 1302    Order Status:  Completed Updated:  09/16/20 1454     Ventricular Rate 105 BPM      Atrial Rate 105 BPM      P-R Interval 142 ms      QRS Duration 86 ms      Q-T Interval 328 ms      QTC Calculation (Bezet) 433 ms      Calculated P Axis 29 degrees      Calculated R Axis 0 degrees      Calculated T Axis 17 degrees      Diagnosis --     Sinus tachycardia  Minimal voltage criteria for LVH, may be normal variant  Inferior infarct (cited on or before 06-JUL-2020)  Possible Anterior infarct (cited on or before 06-JUL-2020)  Abnormal ECG  When compared with ECG of 06-JUL-2020 11:11,  No significant change was found  Confirmed by ANYA ZAMORA (), Nakul Michele ([id number]) on 9/16/2020 2:54:12 PM            Data Review:   Labs:   Labs reviewed    Problem List:  )  Patient Active Problem List   Diagnosis Code    Malignant neoplasm of ascending colon ([address] C18.2    Colon cancer ([address] C18.9    Osteoarthritis of left hip M16.12       Total Joint Surgery Pre-Assessment Recommendations:           Recommend continuous saturation monitoring hours of sleep, during hospitalization. O2 prn per respiratory protocol.        Signed By: Grey Brambila NP-C    September 21, 2020

## 2020-09-22 ENCOUNTER — ANESTHESIA EVENT (OUTPATIENT)
Dept: SURGERY | Age: 70
DRG: 468 | End: 2020-09-22
Payer: MEDICARE

## 2020-09-22 PROCEDURE — 3331090001 HH PPS REVENUE CREDIT

## 2020-09-22 PROCEDURE — 3331090002 HH PPS REVENUE DEBIT

## 2020-09-23 ENCOUNTER — ANESTHESIA (OUTPATIENT)
Dept: SURGERY | Age: 70
DRG: 468 | End: 2020-09-23
Payer: MEDICARE

## 2020-09-23 ENCOUNTER — APPOINTMENT (OUTPATIENT)
Dept: GENERAL RADIOLOGY | Age: 70
DRG: 468 | End: 2020-09-23
Attending: ORTHOPAEDIC SURGERY
Payer: MEDICARE

## 2020-09-23 ENCOUNTER — HOME CARE VISIT (OUTPATIENT)
Dept: HOME HEALTH SERVICES | Facility: HOME HEALTH | Age: 70
End: 2020-09-23
Payer: MEDICARE

## 2020-09-23 ENCOUNTER — HOSPITAL ENCOUNTER (INPATIENT)
Age: 70
LOS: 1 days | Discharge: HOME HEALTH | DRG: 468 | End: 2020-09-24
Attending: ORTHOPAEDIC SURGERY | Admitting: ORTHOPAEDIC SURGERY
Payer: MEDICARE

## 2020-09-23 DIAGNOSIS — M16.12 PRIMARY OSTEOARTHRITIS OF LEFT HIP: Primary | ICD-10-CM

## 2020-09-23 PROBLEM — Z96.649 STATUS POST REVISION OF TOTAL HIP: Status: ACTIVE | Noted: 2020-09-23

## 2020-09-23 LAB
ABO + RH BLD: NORMAL
BLOOD GROUP ANTIBODIES SERPL: NORMAL
GLUCOSE BLD STRIP.AUTO-MCNC: 166 MG/DL (ref 65–100)
HGB BLD-MCNC: 11.2 G/DL (ref 13.6–17.2)
SPECIMEN EXP DATE BLD: NORMAL

## 2020-09-23 PROCEDURE — 77030035236 HC SUT PDS STRATFX BARB J&J -B: Performed by: ORTHOPAEDIC SURGERY

## 2020-09-23 PROCEDURE — 76210000006 HC OR PH I REC 0.5 TO 1 HR: Performed by: ORTHOPAEDIC SURGERY

## 2020-09-23 PROCEDURE — 77030003665 HC NDL SPN BBMI -A: Performed by: ANESTHESIOLOGY

## 2020-09-23 PROCEDURE — 74011000250 HC RX REV CODE- 250: Performed by: ANESTHESIOLOGY

## 2020-09-23 PROCEDURE — 74011250636 HC RX REV CODE- 250/636: Performed by: NURSE PRACTITIONER

## 2020-09-23 PROCEDURE — 77030040830 HC CATH URETH FOL MDII -A: Performed by: ORTHOPAEDIC SURGERY

## 2020-09-23 PROCEDURE — 77030031139 HC SUT VCRL2 J&J -A: Performed by: ORTHOPAEDIC SURGERY

## 2020-09-23 PROCEDURE — 85018 HEMOGLOBIN: CPT

## 2020-09-23 PROCEDURE — 97110 THERAPEUTIC EXERCISES: CPT

## 2020-09-23 PROCEDURE — 77030007880 HC KT SPN EPDRL BBMI -B: Performed by: ANESTHESIOLOGY

## 2020-09-23 PROCEDURE — 2709999900 HC NON-CHARGEABLE SUPPLY

## 2020-09-23 PROCEDURE — 77030034479 HC ADH SKN CLSR PRINEO J&J -B: Performed by: ORTHOPAEDIC SURGERY

## 2020-09-23 PROCEDURE — 3331090001 HH PPS REVENUE CREDIT

## 2020-09-23 PROCEDURE — C1776 JOINT DEVICE (IMPLANTABLE): HCPCS | Performed by: ORTHOPAEDIC SURGERY

## 2020-09-23 PROCEDURE — 74011250636 HC RX REV CODE- 250/636: Performed by: ANESTHESIOLOGY

## 2020-09-23 PROCEDURE — 0SRB04A REPLACEMENT OF LEFT HIP JOINT WITH CERAMIC ON POLYETHYLENE SYNTHETIC SUBSTITUTE, UNCEMENTED, OPEN APPROACH: ICD-10-PCS | Performed by: ORTHOPAEDIC SURGERY

## 2020-09-23 PROCEDURE — 77030003666 HC NDL SPINAL BD -A: Performed by: ORTHOPAEDIC SURGERY

## 2020-09-23 PROCEDURE — 74011250636 HC RX REV CODE- 250/636: Performed by: ORTHOPAEDIC SURGERY

## 2020-09-23 PROCEDURE — 72170 X-RAY EXAM OF PELVIS: CPT

## 2020-09-23 PROCEDURE — 76060000035 HC ANESTHESIA 2 TO 2.5 HR: Performed by: ORTHOPAEDIC SURGERY

## 2020-09-23 PROCEDURE — 74011000250 HC RX REV CODE- 250: Performed by: NURSE ANESTHETIST, CERTIFIED REGISTERED

## 2020-09-23 PROCEDURE — 77030022668 HC TIP SUC IRR PULS STRY –B: Performed by: ORTHOPAEDIC SURGERY

## 2020-09-23 PROCEDURE — 94762 N-INVAS EAR/PLS OXIMTRY CONT: CPT

## 2020-09-23 PROCEDURE — 77030033138 HC SUT PGA STRATFX J&J -B: Performed by: ORTHOPAEDIC SURGERY

## 2020-09-23 PROCEDURE — 74011250636 HC RX REV CODE- 250/636: Performed by: NURSE ANESTHETIST, CERTIFIED REGISTERED

## 2020-09-23 PROCEDURE — 74011250637 HC RX REV CODE- 250/637: Performed by: NURSE PRACTITIONER

## 2020-09-23 PROCEDURE — 74011000250 HC RX REV CODE- 250: Performed by: NURSE PRACTITIONER

## 2020-09-23 PROCEDURE — 2709999900 HC NON-CHARGEABLE SUPPLY: Performed by: ORTHOPAEDIC SURGERY

## 2020-09-23 PROCEDURE — 87635 SARS-COV-2 COVID-19 AMP PRB: CPT

## 2020-09-23 PROCEDURE — 97161 PT EVAL LOW COMPLEX 20 MIN: CPT

## 2020-09-23 PROCEDURE — 36415 COLL VENOUS BLD VENIPUNCTURE: CPT

## 2020-09-23 PROCEDURE — C1713 ANCHOR/SCREW BN/BN,TIS/BN: HCPCS | Performed by: ORTHOPAEDIC SURGERY

## 2020-09-23 PROCEDURE — 65270000029 HC RM PRIVATE

## 2020-09-23 PROCEDURE — 77030018074 HC RTVR SUT ARTH4 S&N -B: Performed by: ORTHOPAEDIC SURGERY

## 2020-09-23 PROCEDURE — 77030006777 HC BLD SAW OSC CNMD -B: Performed by: ORTHOPAEDIC SURGERY

## 2020-09-23 PROCEDURE — 82962 GLUCOSE BLOOD TEST: CPT

## 2020-09-23 PROCEDURE — 0SPB0JZ REMOVAL OF SYNTHETIC SUBSTITUTE FROM LEFT HIP JOINT, OPEN APPROACH: ICD-10-PCS | Performed by: ORTHOPAEDIC SURGERY

## 2020-09-23 PROCEDURE — 77030002922 HC SUT FBRWRE ARTH -B: Performed by: ORTHOPAEDIC SURGERY

## 2020-09-23 PROCEDURE — 77030011266 HC ELECTRD BLD INSL COVD -A: Performed by: ORTHOPAEDIC SURGERY

## 2020-09-23 PROCEDURE — 3331090002 HH PPS REVENUE DEBIT

## 2020-09-23 PROCEDURE — 86900 BLOOD TYPING SEROLOGIC ABO: CPT

## 2020-09-23 PROCEDURE — 77030027138 HC INCENT SPIROMETER -A

## 2020-09-23 PROCEDURE — 97165 OT EVAL LOW COMPLEX 30 MIN: CPT

## 2020-09-23 PROCEDURE — 76010000162 HC OR TIME 1.5 TO 2 HR INTENSV-TIER 1: Performed by: ORTHOPAEDIC SURGERY

## 2020-09-23 DEVICE — X3 INSERT FOR ADM/ MDM
Type: IMPLANTABLE DEVICE | Site: HIP | Status: FUNCTIONAL
Brand: X3

## 2020-09-23 DEVICE — LINER- CEMENTLESS
Type: IMPLANTABLE DEVICE | Site: HIP | Status: FUNCTIONAL
Brand: MDM

## 2020-09-23 DEVICE — UNIVERSAL V40 TAPER ADAPTER SLEEVE
Type: IMPLANTABLE DEVICE | Site: HIP | Status: FUNCTIONAL
Brand: ADAPTER SLEEVE

## 2020-09-23 DEVICE — UNIVERSAL TAPER FEMORAL HEAD
Type: IMPLANTABLE DEVICE | Site: HIP | Status: FUNCTIONAL
Brand: BIOLOX

## 2020-09-23 RX ORDER — AMOXICILLIN 250 MG
2 CAPSULE ORAL DAILY
Status: DISCONTINUED | OUTPATIENT
Start: 2020-09-24 | End: 2020-09-24 | Stop reason: HOSPADM

## 2020-09-23 RX ORDER — SODIUM CHLORIDE 0.9 % (FLUSH) 0.9 %
5-40 SYRINGE (ML) INJECTION EVERY 8 HOURS
Status: DISCONTINUED | OUTPATIENT
Start: 2020-09-23 | End: 2020-09-24 | Stop reason: HOSPADM

## 2020-09-23 RX ORDER — SODIUM CHLORIDE 0.9 % (FLUSH) 0.9 %
5-40 SYRINGE (ML) INJECTION AS NEEDED
Status: DISCONTINUED | OUTPATIENT
Start: 2020-09-23 | End: 2020-09-23 | Stop reason: HOSPADM

## 2020-09-23 RX ORDER — DEXAMETHASONE SODIUM PHOSPHATE 100 MG/10ML
10 INJECTION INTRAMUSCULAR; INTRAVENOUS ONCE
Status: DISCONTINUED | OUTPATIENT
Start: 2020-09-24 | End: 2020-09-24 | Stop reason: HOSPADM

## 2020-09-23 RX ORDER — KETOROLAC TROMETHAMINE 15 MG/ML
15 INJECTION, SOLUTION INTRAMUSCULAR; INTRAVENOUS EVERY 8 HOURS
Status: COMPLETED | OUTPATIENT
Start: 2020-09-23 | End: 2020-09-24

## 2020-09-23 RX ORDER — ONDANSETRON 2 MG/ML
INJECTION INTRAMUSCULAR; INTRAVENOUS AS NEEDED
Status: DISCONTINUED | OUTPATIENT
Start: 2020-09-23 | End: 2020-09-23 | Stop reason: HOSPADM

## 2020-09-23 RX ORDER — NALOXONE HYDROCHLORIDE 0.4 MG/ML
.2-.4 INJECTION, SOLUTION INTRAMUSCULAR; INTRAVENOUS; SUBCUTANEOUS
Status: DISCONTINUED | OUTPATIENT
Start: 2020-09-23 | End: 2020-09-24 | Stop reason: HOSPADM

## 2020-09-23 RX ORDER — CEFAZOLIN SODIUM/WATER 2 G/20 ML
2 SYRINGE (ML) INTRAVENOUS ONCE
Status: COMPLETED | OUTPATIENT
Start: 2020-09-23 | End: 2020-09-23

## 2020-09-23 RX ORDER — TRANEXAMIC ACID 650 1/1
1300 TABLET ORAL
Status: COMPLETED | OUTPATIENT
Start: 2020-09-23 | End: 2020-09-23

## 2020-09-23 RX ORDER — ASPIRIN 81 MG/1
81 TABLET ORAL 2 TIMES DAILY
Qty: 60 TAB | Refills: 0 | Status: SHIPPED | OUTPATIENT
Start: 2020-09-23

## 2020-09-23 RX ORDER — SERTRALINE HYDROCHLORIDE 100 MG/1
200 TABLET, FILM COATED ORAL
Status: DISCONTINUED | OUTPATIENT
Start: 2020-09-23 | End: 2020-09-24 | Stop reason: HOSPADM

## 2020-09-23 RX ORDER — SODIUM CHLORIDE 0.9 % (FLUSH) 0.9 %
5-40 SYRINGE (ML) INJECTION EVERY 8 HOURS
Status: DISCONTINUED | OUTPATIENT
Start: 2020-09-23 | End: 2020-09-23 | Stop reason: HOSPADM

## 2020-09-23 RX ORDER — ONDANSETRON 4 MG/1
4 TABLET, ORALLY DISINTEGRATING ORAL
Status: DISCONTINUED | OUTPATIENT
Start: 2020-09-23 | End: 2020-09-24 | Stop reason: HOSPADM

## 2020-09-23 RX ORDER — DIPHENHYDRAMINE HCL 25 MG
25 CAPSULE ORAL
Status: DISCONTINUED | OUTPATIENT
Start: 2020-09-23 | End: 2020-09-24 | Stop reason: HOSPADM

## 2020-09-23 RX ORDER — FENTANYL CITRATE 50 UG/ML
INJECTION, SOLUTION INTRAMUSCULAR; INTRAVENOUS AS NEEDED
Status: DISCONTINUED | OUTPATIENT
Start: 2020-09-23 | End: 2020-09-23 | Stop reason: HOSPADM

## 2020-09-23 RX ORDER — PHENYLEPHRINE 40 MG/250 ML (160 MCG/ML) IN 0.9 % SODIUM CHLORIDE IV
SOLUTION
Status: DISCONTINUED | OUTPATIENT
Start: 2020-09-23 | End: 2020-09-23 | Stop reason: HOSPADM

## 2020-09-23 RX ORDER — LANOLIN ALCOHOL/MO/W.PET/CERES
1000 CREAM (GRAM) TOPICAL DAILY
Status: DISCONTINUED | OUTPATIENT
Start: 2020-09-24 | End: 2020-09-24 | Stop reason: HOSPADM

## 2020-09-23 RX ORDER — VANCOMYCIN/0.9 % SOD CHLORIDE 1.5G/250ML
1500 PLASTIC BAG, INJECTION (ML) INTRAVENOUS ONCE
Status: COMPLETED | OUTPATIENT
Start: 2020-09-24 | End: 2020-09-24

## 2020-09-23 RX ORDER — GABAPENTIN 100 MG/1
100 CAPSULE ORAL 2 TIMES DAILY
Status: DISCONTINUED | OUTPATIENT
Start: 2020-09-23 | End: 2020-09-24 | Stop reason: HOSPADM

## 2020-09-23 RX ORDER — OXYCODONE AND ACETAMINOPHEN 10; 325 MG/1; MG/1
1 TABLET ORAL AS NEEDED
Status: DISCONTINUED | OUTPATIENT
Start: 2020-09-23 | End: 2020-09-23 | Stop reason: HOSPADM

## 2020-09-23 RX ORDER — CYCLOBENZAPRINE HCL 5 MG
5 TABLET ORAL
Qty: 30 TAB | Refills: 0 | Status: SHIPPED | OUTPATIENT
Start: 2020-09-23

## 2020-09-23 RX ORDER — OXYCODONE HYDROCHLORIDE 5 MG/1
5 TABLET ORAL
Status: DISCONTINUED | OUTPATIENT
Start: 2020-09-23 | End: 2020-09-23 | Stop reason: HOSPADM

## 2020-09-23 RX ORDER — PANTOPRAZOLE SODIUM 40 MG/1
40 TABLET, DELAYED RELEASE ORAL
Status: DISCONTINUED | OUTPATIENT
Start: 2020-09-24 | End: 2020-09-24 | Stop reason: HOSPADM

## 2020-09-23 RX ORDER — HYDROMORPHONE HYDROCHLORIDE 2 MG/1
2 TABLET ORAL
Status: DISCONTINUED | OUTPATIENT
Start: 2020-09-23 | End: 2020-09-24 | Stop reason: HOSPADM

## 2020-09-23 RX ORDER — ACETAMINOPHEN 500 MG
1000 TABLET ORAL EVERY 6 HOURS
Status: DISCONTINUED | OUTPATIENT
Start: 2020-09-23 | End: 2020-09-24 | Stop reason: HOSPADM

## 2020-09-23 RX ORDER — VANCOMYCIN/0.9 % SOD CHLORIDE 1.5G/250ML
1500 PLASTIC BAG, INJECTION (ML) INTRAVENOUS ONCE
Status: COMPLETED | OUTPATIENT
Start: 2020-09-23 | End: 2020-09-23

## 2020-09-23 RX ORDER — AMOXICILLIN 250 MG
1 CAPSULE ORAL DAILY
Qty: 30 TAB | Refills: 0 | Status: SHIPPED | OUTPATIENT
Start: 2020-09-23

## 2020-09-23 RX ORDER — CYCLOBENZAPRINE HCL 10 MG
5 TABLET ORAL
Status: DISCONTINUED | OUTPATIENT
Start: 2020-09-23 | End: 2020-09-24 | Stop reason: HOSPADM

## 2020-09-23 RX ORDER — MIDAZOLAM HYDROCHLORIDE 1 MG/ML
INJECTION, SOLUTION INTRAMUSCULAR; INTRAVENOUS AS NEEDED
Status: DISCONTINUED | OUTPATIENT
Start: 2020-09-23 | End: 2020-09-23 | Stop reason: HOSPADM

## 2020-09-23 RX ORDER — DEXAMETHASONE SODIUM PHOSPHATE 4 MG/ML
INJECTION, SOLUTION INTRA-ARTICULAR; INTRALESIONAL; INTRAMUSCULAR; INTRAVENOUS; SOFT TISSUE AS NEEDED
Status: DISCONTINUED | OUTPATIENT
Start: 2020-09-23 | End: 2020-09-23 | Stop reason: HOSPADM

## 2020-09-23 RX ORDER — CEFAZOLIN SODIUM/WATER 2 G/20 ML
2 SYRINGE (ML) INTRAVENOUS EVERY 8 HOURS
Status: COMPLETED | OUTPATIENT
Start: 2020-09-23 | End: 2020-09-24

## 2020-09-23 RX ORDER — SODIUM CHLORIDE, SODIUM LACTATE, POTASSIUM CHLORIDE, CALCIUM CHLORIDE 600; 310; 30; 20 MG/100ML; MG/100ML; MG/100ML; MG/100ML
75 INJECTION, SOLUTION INTRAVENOUS CONTINUOUS
Status: DISCONTINUED | OUTPATIENT
Start: 2020-09-23 | End: 2020-09-23 | Stop reason: HOSPADM

## 2020-09-23 RX ORDER — KETOROLAC TROMETHAMINE 30 MG/ML
INJECTION, SOLUTION INTRAMUSCULAR; INTRAVENOUS AS NEEDED
Status: DISCONTINUED | OUTPATIENT
Start: 2020-09-23 | End: 2020-09-23 | Stop reason: HOSPADM

## 2020-09-23 RX ORDER — ROPIVACAINE HYDROCHLORIDE 2 MG/ML
INJECTION, SOLUTION EPIDURAL; INFILTRATION; PERINEURAL AS NEEDED
Status: DISCONTINUED | OUTPATIENT
Start: 2020-09-23 | End: 2020-09-23 | Stop reason: HOSPADM

## 2020-09-23 RX ORDER — ACETAMINOPHEN 500 MG
1000 TABLET ORAL
Qty: 60 TAB | Refills: 0 | Status: SHIPPED | OUTPATIENT
Start: 2020-09-23

## 2020-09-23 RX ORDER — EPHEDRINE SULFATE/0.9% NACL/PF 50 MG/5 ML
SYRINGE (ML) INTRAVENOUS AS NEEDED
Status: DISCONTINUED | OUTPATIENT
Start: 2020-09-23 | End: 2020-09-23 | Stop reason: HOSPADM

## 2020-09-23 RX ORDER — HYDROMORPHONE HYDROCHLORIDE 1 MG/ML
1 INJECTION, SOLUTION INTRAMUSCULAR; INTRAVENOUS; SUBCUTANEOUS
Status: DISCONTINUED | OUTPATIENT
Start: 2020-09-23 | End: 2020-09-24 | Stop reason: HOSPADM

## 2020-09-23 RX ORDER — HYDROMORPHONE HYDROCHLORIDE 2 MG/ML
0.5 INJECTION, SOLUTION INTRAMUSCULAR; INTRAVENOUS; SUBCUTANEOUS
Status: DISCONTINUED | OUTPATIENT
Start: 2020-09-23 | End: 2020-09-23 | Stop reason: HOSPADM

## 2020-09-23 RX ORDER — ONDANSETRON 8 MG/1
8 TABLET, ORALLY DISINTEGRATING ORAL
Status: DISCONTINUED | OUTPATIENT
Start: 2020-09-23 | End: 2020-09-24 | Stop reason: HOSPADM

## 2020-09-23 RX ORDER — ASPIRIN 81 MG/1
81 TABLET ORAL EVERY 12 HOURS
Status: DISCONTINUED | OUTPATIENT
Start: 2020-09-23 | End: 2020-09-24 | Stop reason: HOSPADM

## 2020-09-23 RX ORDER — HYDROMORPHONE HYDROCHLORIDE 2 MG/1
2 TABLET ORAL
Qty: 40 TAB | Refills: 0 | Status: SHIPPED | OUTPATIENT
Start: 2020-09-23 | End: 2020-09-30

## 2020-09-23 RX ORDER — PROPOFOL 10 MG/ML
VIAL (ML) INTRAVENOUS
Status: DISCONTINUED | OUTPATIENT
Start: 2020-09-23 | End: 2020-09-23 | Stop reason: HOSPADM

## 2020-09-23 RX ORDER — BUPIVACAINE HYDROCHLORIDE 7.5 MG/ML
INJECTION, SOLUTION INTRASPINAL
Status: COMPLETED | OUTPATIENT
Start: 2020-09-23 | End: 2020-09-23

## 2020-09-23 RX ORDER — GABAPENTIN 100 MG/1
100 CAPSULE ORAL 2 TIMES DAILY
Qty: 30 CAP | Refills: 0 | Status: SHIPPED | OUTPATIENT
Start: 2020-09-23

## 2020-09-23 RX ORDER — SODIUM CHLORIDE 9 MG/ML
100 INJECTION, SOLUTION INTRAVENOUS CONTINUOUS
Status: DISCONTINUED | OUTPATIENT
Start: 2020-09-23 | End: 2020-09-24 | Stop reason: HOSPADM

## 2020-09-23 RX ORDER — MELOXICAM 7.5 MG/1
7.5 TABLET ORAL 2 TIMES DAILY
Status: DISCONTINUED | OUTPATIENT
Start: 2020-09-24 | End: 2020-09-24 | Stop reason: HOSPADM

## 2020-09-23 RX ORDER — SODIUM CHLORIDE 0.9 % (FLUSH) 0.9 %
5-40 SYRINGE (ML) INJECTION AS NEEDED
Status: DISCONTINUED | OUTPATIENT
Start: 2020-09-23 | End: 2020-09-24 | Stop reason: HOSPADM

## 2020-09-23 RX ORDER — ONDANSETRON 8 MG/1
8 TABLET, ORALLY DISINTEGRATING ORAL
Qty: 30 TAB | Refills: 0 | Status: SHIPPED | OUTPATIENT
Start: 2020-09-23

## 2020-09-23 RX ADMIN — PHENYLEPHRINE HYDROCHLORIDE 80 MCG: 10 INJECTION INTRAVENOUS at 13:35

## 2020-09-23 RX ADMIN — Medication 1 AMPULE: at 21:06

## 2020-09-23 RX ADMIN — PHENYLEPHRINE HYDROCHLORIDE 80 MCG: 10 INJECTION INTRAVENOUS at 11:56

## 2020-09-23 RX ADMIN — TRANEXAMIC ACID 1300 MG: 650 TABLET ORAL at 16:03

## 2020-09-23 RX ADMIN — HYDROMORPHONE HYDROCHLORIDE 2 MG: 2 TABLET ORAL at 21:05

## 2020-09-23 RX ADMIN — Medication 1 AMPULE: at 15:55

## 2020-09-23 RX ADMIN — PHENYLEPHRINE HYDROCHLORIDE 80 MCG: 10 INJECTION INTRAVENOUS at 12:00

## 2020-09-23 RX ADMIN — Medication 5 MG: at 12:21

## 2020-09-23 RX ADMIN — BUPIVACAINE HYDROCHLORIDE IN DEXTROSE 10 MG: 7.5 INJECTION, SOLUTION SUBARACHNOID at 11:44

## 2020-09-23 RX ADMIN — Medication 5 ML: at 15:00

## 2020-09-23 RX ADMIN — PHENYLEPHRINE HYDROCHLORIDE 160 MCG: 10 INJECTION INTRAVENOUS at 12:10

## 2020-09-23 RX ADMIN — SODIUM CHLORIDE, SODIUM LACTATE, POTASSIUM CHLORIDE, AND CALCIUM CHLORIDE 75 ML/HR: 600; 310; 30; 20 INJECTION, SOLUTION INTRAVENOUS at 10:05

## 2020-09-23 RX ADMIN — TRANEXAMIC ACID 1300 MG: 650 TABLET ORAL at 21:05

## 2020-09-23 RX ADMIN — GABAPENTIN 100 MG: 100 CAPSULE ORAL at 18:30

## 2020-09-23 RX ADMIN — ACETAMINOPHEN 1000 MG: 500 TABLET, FILM COATED ORAL at 23:41

## 2020-09-23 RX ADMIN — Medication 10 MG: at 12:24

## 2020-09-23 RX ADMIN — VANCOMYCIN HYDROCHLORIDE 1500 MG: 10 INJECTION, POWDER, LYOPHILIZED, FOR SOLUTION INTRAVENOUS at 23:41

## 2020-09-23 RX ADMIN — PROPOFOL 75 MCG/KG/MIN: 10 INJECTION, EMULSION INTRAVENOUS at 11:52

## 2020-09-23 RX ADMIN — PHENYLEPHRINE HYDROCHLORIDE 80 MCG: 10 INJECTION INTRAVENOUS at 12:21

## 2020-09-23 RX ADMIN — Medication 10 ML: at 22:00

## 2020-09-23 RX ADMIN — KETOROLAC TROMETHAMINE 15 MG: 15 INJECTION, SOLUTION INTRAMUSCULAR; INTRAVENOUS at 21:05

## 2020-09-23 RX ADMIN — VANCOMYCIN HYDROCHLORIDE 1500 MG: 10 INJECTION, POWDER, LYOPHILIZED, FOR SOLUTION INTRAVENOUS at 12:20

## 2020-09-23 RX ADMIN — SERTRALINE HYDROCHLORIDE 200 MG: 100 TABLET ORAL at 21:05

## 2020-09-23 RX ADMIN — CEFAZOLIN SODIUM 2 G: 10 INJECTION, POWDER, FOR SOLUTION INTRAVENOUS at 21:06

## 2020-09-23 RX ADMIN — PHENYLEPHRINE HYDROCHLORIDE 80 MCG: 10 INJECTION INTRAVENOUS at 12:24

## 2020-09-23 RX ADMIN — ASPIRIN 81 MG: 81 TABLET, COATED ORAL at 21:05

## 2020-09-23 RX ADMIN — KETOROLAC TROMETHAMINE 15 MG: 15 INJECTION, SOLUTION INTRAMUSCULAR; INTRAVENOUS at 15:55

## 2020-09-23 RX ADMIN — FENTANYL CITRATE 50 MCG: 50 INJECTION INTRAMUSCULAR; INTRAVENOUS at 11:38

## 2020-09-23 RX ADMIN — PHENYLEPHRINE HYDROCHLORIDE 80 MCG: 10 INJECTION INTRAVENOUS at 12:05

## 2020-09-23 RX ADMIN — PHENYLEPHRINE HYDROCHLORIDE 80 MCG: 10 INJECTION INTRAVENOUS at 11:49

## 2020-09-23 RX ADMIN — ACETAMINOPHEN 1000 MG: 500 TABLET, FILM COATED ORAL at 18:30

## 2020-09-23 RX ADMIN — FENTANYL CITRATE 50 MCG: 50 INJECTION INTRAMUSCULAR; INTRAVENOUS at 11:36

## 2020-09-23 RX ADMIN — SODIUM CHLORIDE, SODIUM LACTATE, POTASSIUM CHLORIDE, AND CALCIUM CHLORIDE: 600; 310; 30; 20 INJECTION, SOLUTION INTRAVENOUS at 13:19

## 2020-09-23 RX ADMIN — Medication 5 MG: at 12:16

## 2020-09-23 RX ADMIN — Medication 2 G: at 11:30

## 2020-09-23 RX ADMIN — Medication 60 MCG/MIN: at 12:26

## 2020-09-23 RX ADMIN — TRANEXAMIC ACID 1000 MG: 100 INJECTION, SOLUTION INTRAVENOUS at 11:40

## 2020-09-23 RX ADMIN — DEXAMETHASONE SODIUM PHOSPHATE 10 MG: 4 INJECTION, SOLUTION INTRAMUSCULAR; INTRAVENOUS at 12:10

## 2020-09-23 RX ADMIN — MIDAZOLAM 2 MG: 1 INJECTION INTRAMUSCULAR; INTRAVENOUS at 11:34

## 2020-09-23 RX ADMIN — HYDROMORPHONE HYDROCHLORIDE 2 MG: 2 TABLET ORAL at 16:03

## 2020-09-23 RX ADMIN — ONDANSETRON 4 MG: 2 INJECTION INTRAMUSCULAR; INTRAVENOUS at 12:13

## 2020-09-23 NOTE — ANESTHESIA PREPROCEDURE EVALUATION
Relevant Problems   No relevant active problems       Anesthetic History   No history of anesthetic complications            Review of Systems / Medical History  Patient summary reviewed and pertinent labs reviewed    Pulmonary  Within defined limits                 Neuro/Psych             Comments: anxiety Cardiovascular                  Exercise tolerance: >4 METS     GI/Hepatic/Renal     GERD: well controlled           Endo/Other        Obesity and arthritis     Other Findings   Comments: Fever today--Covid rapid test neg           Physical Exam    Airway  Mallampati: II  TM Distance: > 6 cm  Neck ROM: decreased range of motion   Mouth opening: Normal     Cardiovascular    Rhythm: regular           Dental    Dentition: Caps/crowns     Pulmonary                 Abdominal  GI exam deferred       Other Findings            Anesthetic Plan    ASA: 3  Anesthesia type: spinal          Induction: Intravenous  Anesthetic plan and risks discussed with: Patient      3rd dislocation since recent hip replacement. Surgeon and pt wish to proceed.  Negative rapid Covid test. Plan SAB

## 2020-09-23 NOTE — PROGRESS NOTES
Problem: Self Care Deficits Care Plan (Adult)  Goal: *Acute Goals and Plan of Care (Insert Text)  Outcome: Progressing Towards Goal  Note: GOALS:   DISCHARGE GOALS (in preparation for going home/rehab):  3 days  1. Mr. Song Whitmore will perform one lower body dressing activity with minimal assistance with adaptive equipment to demonstrate improved functional mobility and safety. 2.  Mr. Song Whitmore will perform one lower body bathing activity with minimal  assistance with adaptive equipment to demonstrate improved functional mobility and safety. 3.  Mr. Song Whitmore will perform toileting/toilet transfer with contact guard assistance with adaptive equipment to demonstrate improved functional mobility and safety. 4.  Mr. Song Whitmore will perform shower transfer with contact guard assistance with adaptive equipment to demonstrate improved functional mobility and safety. 5.  Mr. Song Whitmore will state SUSANA precautions with two verbal cues to demonstrate improved functional mobility and safety. JOINT CAMP OCCUPATIONAL THERAPY SUSANA: Initial Assessment and PM 9/23/2020  INPATIENT: Hospital Day: 1  [place] / [place] / Product Type: Medicare /      NAME/AGE/GENDER: Nuno Aleman is a 69 y.o. male   PRIMARY DIAGNOSIS:  Status post left hip replacement [Z96.642]  Dislocation of internal left hip prosthesis, sequela [T84.021S]   Procedure(s) and Anesthesia Type:     * LEFT HIP ARTHROPLASTY TOTAL REVISION / PRISCILLA - General (Left)  ICD-10: Treatment Diagnosis:    Pain in left hip (M25.552)  Stiffness of Left Hip, Not elsewhere classified (M25.652)  Other lack of cordination (R27.8)  Difficulty in walking, Not elsewhere classified (R26.2)  Other abnormalities of gait and mobility (R26.89)      ASSESSMENT:     Mr. Song Whitmore is s/p Left SUSANA REVISION due to multiple dislocations. HE is min assist supine to sit and min for transfers. He is sitting up in recliner working with PT.  He plans to discharge home with his wife to assist. He expressed no concerns and was educated on Hip precautions. Will see in am for full ADL session, he should progress well with ADL skills. This section established at most recent assessment   PROBLEM LIST (Impairments causing functional limitations):  Decreased Strength  Decreased ADL/Functional Activities  Decreased Transfer Abilities  Increased Pain  Increased Fatigue  Decreased Flexibility/Joint Mobility  Decreased Knowledge of Precautions   INTERVENTIONS PLANNED: (Benefits and precautions of occupational therapy have been discussed with the patient.)  Activities of daily living training  Adaptive equipment training  Balance training  Clothing management  Donning&doffing training  Theraputic activity     TREATMENT PLAN: Frequency/Duration: Follow patient 2 times to address above goals. Rehabilitation Potential For Stated Goals: Good     RECOMMENDED REHABILITATION/EQUIPMENT: (at time of discharge pending progress): Continue Skilled Therapy. OCCUPATIONAL PROFILE AND HISTORY:   History of Present Injury/Illness (Reason for Referral): Pt presents this date s/p (left) SUSANA. Revision due to dislocation  Past Medical History/Comorbidities:   Mr. Sanjeev Barry  has a past medical history of Anemia, Anxiety, Arthritis, Chronic pain, Cigar smoker, GERD (gastroesophageal reflux disease), History of colon cancer, Malignant neoplasm of ascending colon ([facility] [address] PUD (peptic ulcer disease) (1979), Rectal hemorrhage, Sinus tachycardia, and Weakness of right upper extremity. Mr. Sanjeev Barry  has a past surgical history that includes hx cataract removal (Bilateral, 2017); hx heent (1988); hx cervical fusion (2013); hx lumbar fusion (1992); hx colonoscopy; hx colectomy (Right, 07/2019); and hx hip replacement (Left, 07/2020).   Social History/Living Environment:   Home Environment: Private residence  # Steps to Enter: 0  Rails to Enter: No  One/Two Story Residence: One story  Living Alone: No  Support Systems: Spouse/Significant Other/Partner  Patient Expects to be Discharged to[de-identified] Private residence  Current DME Used/Available at Home: Crutches  Tub or Shower Type: Shower  Prior Level of Function/Work/Activity:  Mod I used crutches and wore knee immobilizer     Number of Personal Factors/Comorbidities that affect the Plan of Care: Brief history (0):  LOW COMPLEXITY   ASSESSMENT OF OCCUPATIONAL PERFORMANCE[de-identified]   Most Recent Physical Functioning:   Balance  Sitting: Intact  Standing: Pull to stand; With support  Standing - Static: Fair  Standing - Dynamic : Fair                    Coordination  Fine Motor Skills-Upper: Left Intact; Right Intact  Gross Motor Skills-Upper: Left Intact; Right Intact         Mental Status  Neurologic State: Alert; Appropriate for age  Orientation Level: Appropriate for age  Cognition: Appropriate decision making; Appropriate for age attention/concentration; Appropriate safety awareness; Follows commands  Perception: Appears intact  Perseveration: No perseveration noted  Safety/Judgement: Awareness of environment; Fall prevention                Basic ADLs (From Assessment) Complex ADLs (From Assessment)   Basic ADL  Feeding: Supervision  Oral Facial Hygiene/Grooming: Supervision  Bathing: Minimum assistance, Moderate assistance  Upper Body Dressing: Supervision  Lower Body Dressing: Moderate assistance  Toileting: Minimum assistance     Grooming/Bathing/Dressing Activities of Daily Living     Cognitive Retraining  Safety/Judgement: Awareness of environment; Fall prevention                 Functional Transfers  Toilet Transfer : Minimum assistance  Shower Transfer: Minimum assistance     Bed/Mat Mobility  Supine to Sit: Minimum assistance  Sit to Stand: Minimum assistance  Stand to Sit: Minimum assistance  Bed to Chair: Minimum assistance  Scooting: Minimum assistance         Physical Skills Involved:  Balance  Activity Tolerance Cognitive Skills Affected (resulting in the inability to perform in a timely and safe manner):  none Psychosocial Skills Affected:  Self-Awareness   Number of elements that affect the Plan of Care: 1-3:  LOW COMPLEXITY   CLINICAL DECISION MAKIN[address] [place] [address] AM-PAC 6 Clicks   Daily Activity Inpatient Short Form  How much help from another person does the patient currently need. .. Total A Lot A Little None   1. Putting on and taking off regular lower body clothing? [] 1   [x] 2   [] 3   [] 4   2. Bathing (including washing, rinsing, drying)? [] 1   [x] 2   [] 3   [] 4   3. Toileting, which includes using toilet, bedpan or urinal?   [] 1   [] 2   [x] 3   [] 4   4. Putting on and taking off regular upper body clothing? [] 1   [] 2   [] 3   [x] 4   5. Taking care of personal grooming such as brushing teeth? [] 1   [] 2   [] 3   [x] 4   6. Eating meals? [] 1   [] 2   [] 3   [x] 4   © , Trustees of [address] [place] [address], under license to [employer]. All rights reserved     Score:  Initial:19 Most Recent: X (Date: -- )    Interpretation of Tool:  Represents activities that are increasingly more difficult (i.e. Bed mobility, Transfers, Gait). Medical Necessity:     · Patient is expected to demonstrate progress in   · Self care skills and functional mobility  ·     Reason for Services/Other Comments:  · Patient continues to require skilled intervention due to   · Above listed deficits     Use of outcome tool(s) and clinical judgement create a POC that gives a: LOW COMPLEXITY            TREATMENT:   (In addition to Assessment/Re-Assessment sessions the following treatments were rendered)     Pre-treatment Symptoms/Complaints:  \  Pain: Initial:   Pain Intensity 1: 0  Post Session:  0/10 icepack given     Assessment/Reassessment only, no treatment provided today    Treatment/Session Assessment:     Response to Treatment:  tolerated well, up in recliner.     Education:  [] Home Exercises  [x] Fall Precautions  [x] Hip Precautions [] Going Home Video  [] Knee/Hip Prosthesis Review  [x] Walker Management/Safety [x] Adaptive Equipment as Needed       Interdisciplinary Collaboration:   Physical Therapist  Occupational Therapist  Registered Nurse    After treatment position/precautions:   Up in chair  Bed/Chair-wheels locked  Caregiver at bedside  Call light within reach  RN notified     Compliance with Program/Exercises: Compliant all of the time, Will assess as treatment progresses. Recommendations/Intent for next treatment session:  Treatment next visit will focus on increasing Mr. Nogueras independence with bed mobility, transfers, self care, functional mobility, modalities for pain, and patient education.       Total Treatment Duration:  OT Patient Time In/Time Out  Time In: 1510  Time Out: Alon [address], OT

## 2020-09-23 NOTE — PROGRESS NOTES
09/23/20 1442   Oxygen Therapy   O2 Sat (%) 93 %   Pulse via Oximetry 74 beats per minute   O2 Device Nasal cannula   O2 Flow Rate (L/min) 2 l/min   Incentive Spirometry Treatment   Actual Volume (ml)   (WIFE IS BRINGING IS)   . Patient encouraged to do every hour while awake-patient agreed and demonstrated. No shortness of breath or distress noted. BS are clear b/l.    [facility] notes reviewed- continuous sat # ordered HS

## 2020-09-23 NOTE — ANESTHESIA POSTPROCEDURE EVALUATION
Procedure(s):  LEFT HIP ARTHROPLASTY TOTAL REVISION / PRISCILLA.    spinal    Anesthesia Post Evaluation      Multimodal analgesia: multimodal analgesia used between 6 hours prior to anesthesia start to PACU discharge  Patient location during evaluation: PACU  Patient participation: complete - patient participated  Level of consciousness: awake and alert  Pain management: adequate  Airway patency: patent  Anesthetic complications: no  Cardiovascular status: acceptable  Respiratory status: acceptable  Hydration status: acceptable  Post anesthesia nausea and vomiting:  controlled  Final Post Anesthesia Temperature Assessment:  Normothermia (36.0-37.5 degrees C)      INITIAL Post-op Vital signs:   Vitals Value Taken Time   /55 9/23/2020  2:10 PM   Temp 37.1 °C (98.7 °F) 9/23/2020  1:35 PM   Pulse 84 9/23/2020  2:10 PM   Resp 16 9/23/2020  2:10 PM   SpO2 96 % 9/23/2020  2:10 PM

## 2020-09-23 NOTE — ANESTHESIA PROCEDURE NOTES
Spinal Block    Start time: 9/23/2020 11:39 AM  End time: 9/23/2020 11:44 AM  Performed by: Montez Cole MD  Authorized by: Montez Cole MD     Pre-procedure:   Indications: primary anesthetic  Preanesthetic Checklist: patient identified, risks and benefits discussed, anesthesia consent, site marked, patient being monitored and timeout performed    Timeout Time: 11:39          Spinal Block:   Patient Position:  Seated  Prep Region:  Lumbar  Prep: chlorhexidine      Location:  L2-3          Needle:   Needle Type:  Pencan  Needle Gauge:  25 G  Attempts:  2      Events: CSF confirmed, no blood with aspiration and no paresthesia        Assessment:  Insertion:  Complicated  Patient tolerance:  Patient tolerated the procedure well with no immediate complications  Failed attempt at L3-4

## 2020-09-23 NOTE — H&P
[facility]  Pre Operative History and Physical Exam    Patient ID:  Osbaldo Collier  [id number]  6 y.o.  1950    Today: September 23, 2020       CC:  Left hip pain    HPI:   The patient has recurrent dislocation of left SUSANA. Has dislocated on two separate occassions. In each instance it does not seem that the patient was breaking any of his precautions. There have been no changes to the patient's orthopedic condition since the last office visit    Past Medical History:  Past Medical History:   Diagnosis Date    Anemia     Anxiety     Arthritis     OA    Chronic pain     headaches    Cigar smoker     history     GERD (gastroesophageal reflux disease)     controlled with medication    History of colon cancer     Malignant [address] of ascending colon ([facility] [address]     PUD (peptic ulcer disease) 1979    Rectal hemorrhage     Sinus tachycardia     Weakness of right upper extremity        Past Surgical History:  Past Surgical History:   Procedure Laterality Date    HX CATARACT REMOVAL Bilateral 2017    HX CERVICAL FUSION  2013    HX COLECTOMY Right 07/2019    HX COLONOSCOPY      HX HEENT  1988    acoustic neuroma    HX HIP REPLACEMENT Left 07/2020    HX LUMBAR FUSION  1992        Medications:     Prior to Admission medications    Medication Sig Start Date End Date Taking? Authorizing Provider   sertraline (ZOLOFT) 100 mg tablet Take 200 mg by mouth nightly. Provider, Historical   docusate sodium (DULCOLAX STOOL SOFTENER, DSS, PO) Take 1 Tab by mouth as needed (constipation). 1-3 tabs a day    Provider, Historical   acetaminophen (TYLENOL) 500 mg tablet Take 2 Tabs by mouth every six (6) hours as needed for Pain. 7/29/20   Nilda Espinal MD   aspirin delayed-release 81 mg tablet Take 1 Tab by mouth two (2) times a day. Patient taking differently: Take 81 mg by mouth nightly.  7/29/20   Nilda Espinal MD   gabapentin (NEURONTIN) 100 mg capsule Take 1 Cap by mouth two (2) times a day. 7/29/20   Alli Kay MD   cyclobenzaprine (FLEXERIL) 5 mg tablet Take 1 Tab by mouth three (3) times daily as needed for Muscle Spasm(s). 7/29/20   Alli Kay MD   ondansetron (ZOFRAN ODT) 8 mg disintegrating tablet Take 1 Tab by mouth every eight (8) hours as needed for Nausea or Vomiting. 7/29/20   Alli Kay MD   senna-docusate (PERICOLACE) 8.6-50 mg per tablet Take 1 Tab by mouth daily. 7/29/20   Alli Kay MD   multivitamin (ONE A DAY) tablet Take 1 Tab by mouth daily. Provider, Historical   cyanocobalamin 1,000 mcg tablet Take 1,000 mcg by mouth daily. Provider, Historical   omeprazole (PRILOSEC) 20 mg capsule Take 40 mg by mouth. Provider, Historical       Family History:     Family History   Problem Relation Age of Onset    Cancer Mother         breast    Diabetes Mother     Hypertension Mother     Heart Attack Father     Hypertension Brother        Social History:      Social History     Tobacco Use    Smoking status: Former Smoker     Types: Cigars    Smokeless tobacco: Former User     Quit date: 2018    Tobacco comment: occ. cigar   Substance Use Topics    Alcohol use: Not Currently     Alcohol/week: 5.0 - 6.0 standard drinks     Types: 5 - 6 Cans of beer per week         Allergies:    No Known Allergies     Vitals:      Visit Vitals  Ht 5' 7.5\" (1.715 m)   Wt 88 kg (194 lb)   BMI 29.94 kg/m²        Objective:         General: No Acute distress                   HEENT: Normocephalic/atramatic                   Lungs:  Breathing non-labored                   Heart:   RRR                    Abdomen: soft       Extremities:  Prior exam done in office has shown well healed incision. No erythema, induration or drainage. Distally patient has no neurologic deficit. Patient Active Problem List   Diagnosis Code    Malignant neoplasm of ascending colon ([facility] [address] C18.2    Colon cancer (HCC) C18.9    Osteoarthritis of left hip M16.12       Assessment:   1.  Recurrent dislocation of Left hip    Plan:   The patient has failed previous conservative treatment for this condition. The risks, benefits, alternatives and possible complications of revision left total hip arthroplasty have been discussed with the patient including but not limited to infection, bleeding, damage to nerves and blood vessels with particular attention given to risk of damage of the femoral, obturator, lateral femoral cutaneous, superior gluteal, inferior gluteal, and sciatic nerve, risk of dislocation, fracture both intra-op and post-op, limb length inequality, polyethylene wear, implant loosening, risk for continued pain, and risk for revision surgery secondary to but not limited to all of these discussed risks. Further we discussed risk of venous thrombo-embolism including deep vein thrombosis and pulmonary embolism despite being on prophylaxis. We have also previously discussed the potential of morbidity and mortality associated with, but not limited to, the actual surgical procedure, anesthesia, prior medical conditions, and/or the administration of medications perioperatively. I have made no guarantees to the patient regarding outcomes and the patient has voiced understanding of that. The patient has been given the opportunity to ask questions all of which have been answered and the patient wishes to proceed. The patient was counseled at length about the risks of jody Covid-19 during their perioperative period and any recovery window from their procedure. The patient was made aware that jody Covid-19  may worsen their prognosis for recovering from their procedure and lend to a higher morbidity and/or mortality risk. All material risks, benefits, and reasonable alternatives including postponing the procedure were discussed. The patient does  wish to proceed with the procedure at this time.         Signed By: Jared Morales MD  September 23, 2020

## 2020-09-23 NOTE — PERIOP NOTES
Betadine lavage:  17.5cc of betadine lot # [id number]  , exp. Date  09/2021,  in 500cc of . 9NS Lot # [id number] , exp.  Date : 01/01/2023

## 2020-09-23 NOTE — PROGRESS NOTES
Patient A/O x 4 pain has been well controlled, denies nausea, has been up in recliner, moving lower extremities with no deficits. All Neuro Vascular checked are WDL as documented, no needs at this time.

## 2020-09-23 NOTE — PROGRESS NOTES
Problem: Mobility Impaired (Adult and Pediatric)  Goal: *Acute Goals and Plan of Care (Insert Text)  Outcome: Progressing Towards Goal  Note: GOALS (1-4 days):  (1.)Mr. Sobeida Al will move from supine to sit and sit to supine  in bed with STAND BY ASSIST.    (2.)Mr. Sobeida Al will transfer from bed to chair and chair to bed with STAND BY ASSIST using the least restrictive device. (3.)Mr. Sobeida Al will ambulate with STAND BY ASSIST for 150 feet with the least restrictive device. (4.)Mr. Sobeida Al will ambulate up/down 1 steps with bilateral  railing with MINIMAL ASSIST or with device. (5.)Mr. Sobeida Al will state/observe VALENTINO precautions with 0 verbal cues. ________________________________________________________________________________________________       PHYSICAL THERAPY JOINT CAMP VALENTINO: Initial Assessment and PM 9/23/2020  INPATIENT: Hospital Day: 1  [place] / [place] / Product Type: Medicare /      NAME/AGE/GENDER: Montse Fregoso is a 69 y.o. male   PRIMARY DIAGNOSIS:  Status post left hip replacement [Z96.642]  Dislocation of internal left hip prosthesis, sequela [T84.021S]   Procedure(s) and Anesthesia Type:     * LEFT HIP ARTHROPLASTY TOTAL REVISION / PRISCILLA - General (Left)  ICD-10: Treatment Diagnosis:    Pain in left hip (M25.552)  Stiffness of Left Hip, Not elsewhere classified (M25.652)  Difficulty in walking, Not elsewhere classified (R26.2)      ASSESSMENT:     Mr. Sobeida Al presents with decreased functional mobility and gait as well as decreased rom and strength of left LE s/p left valentino revision. He plans to go home with HHPT. Reviewed valentino precautions in detail.     This section established at most recent assessment   PROBLEM LIST (Impairments causing functional limitations):  Decreased Strength  Decreased ADL/Functional Activities  Decreased Transfer Abilities  Decreased Ambulation Ability/Technique  Decreased Balance  Increased Pain  Decreased Activity Tolerance  Decreased Flexibility/Joint Mobility  Decreased [place] with Home Exercise Program  Decreased strength   INTERVENTIONS PLANNED: (Benefits and precautions of physical therapy have been discussed with the patient.)  Bed Mobility  Cold  Gait Training  Home Exercise Program (HEP)  Range of Motion (ROM)  Therapeutic Activites  Therapeutic Exercise/Strengthening  Transfer Training     TREATMENT PLAN: Frequency/Duration: Follow patient BID for duration of hospital stay to address above goals. Rehabilitation Potential For Stated Goals: Good     RECOMMENDED REHABILITATION/EQUIPMENT: (at time of discharge pending progress): Continue Skilled Therapy and Home Health: Physical Therapy. HISTORY:   History of Present Injury/Illness (Reason for Referral):  S/p left valentino revision  Past Medical History/Comorbidities:   Mr. Rafaela Belle  has a past medical history of Anemia, Anxiety, Arthritis, Chronic pain, Cigar smoker, GERD (gastroesophageal reflux disease), History of colon cancer, Malignant neoplasm of ascending colon ([address] PUD (peptic ulcer disease) (1979), Rectal hemorrhage, Sinus tachycardia, and Weakness of right upper extremity. Mr. Rafaela Belle  has a past surgical history that includes hx cataract removal (Bilateral, 2017); hx heent (1988); hx cervical fusion (2013); hx lumbar fusion (1992); hx colonoscopy; hx colectomy (Right, 07/2019); and hx hip replacement (Left, 07/2020).    Social History/Living Environment:   Home Environment: Private residence  # Steps to Enter: 0  Rails to Enter: No  One/Two Story Residence: One story  Living Alone: No  Support Systems: Spouse/Significant Other/Partner  Patient Expects to be Discharged to[de-identified] Private residence  Current DME Used/Available at Home: Crutches  Tub or Shower Type: Shower    Prior Level of Function/Work/Activity:  Was using K[employer] and crutches due to dislocations   Number of Personal Factors/Comorbidities that affect the Plan of Care: 1-2: MODERATE COMPLEXITY   EXAMINATION: Most Recent Physical Functioning:                 LLE AROM  L Hip Flexion: 80  L Hip ABduction: 5          Bed Mobility  Supine to Sit: Minimum assistance  Scooting: Minimum assistance    Transfers  Sit to Stand: Minimum assistance  Stand to Sit: Minimum assistance  Bed to Chair: Minimum assistance    Balance  Sitting: Intact  Standing: With support;Pull to stand  Standing - Static: Fair  Standing - Dynamic : Fair              Weight Bearing Status  Left Side Weight Bearing: As tolerated  Distance (ft): 15 Feet (ft)  Ambulation - Level of Assistance: Minimal assistance  Assistive Device: Walker, rolling  Speed/She: Delayed  Step Length: Right shortened;Left shortened  Stance: Left decreased  Gait Abnormalities: Antalgic;Decreased step clearance  Interventions: Safety awareness training;Verbal cues     Braces/Orthotics:     Left Hip Cold  Type: Cold/ice pack      Body Structures Involved:  Bones  Joints  Muscles  Ligaments Body Functions Affected: Movement Related Activities and Participation Affected: Mobility   Number of elements that affect the Plan of Care: 3: MODERATE COMPLEXITY   CLINICAL PRESENTATION:   Presentation: Stable and uncomplicated: LOW COMPLEXITY   CLINICAL DECISION MAKIN [facility] [address] [id number] AM-PAC 6 Clicks   Basic Mobility Inpatient Short Form  How much difficulty does the patient currently have. .. Unable A Lot A Little None   1. Turning over in bed (including adjusting bedclothes, sheets and blankets)? [] 1   [] 2   [x] 3   [] 4   2. Sitting down on and standing up from a chair with arms ( e.g., wheelchair, bedside commode, etc.)   [] 1   [] 2   [x] 3   [] 4   3. Moving from lying on back to sitting on the side of the bed? [] 1   [] 2   [x] 3   [] 4   How much help from another person does the patient currently need. .. Total A Lot A Little None   4. Moving to and from a bed to a chair (including a wheelchair)? [] 1   [] 2   [x] 3   [] 4   5. Need to walk in hospital room? [] 1   [] 2   [x] 3   [] 4   6. Climbing 3-5 steps with a railing? [] 1   [x] 2   [] 3   [] 4   © 2007, Trustees of [address] [place] [address] [address] [id number], under license to [employer]. All rights reserved     Score:  Initial: 17 Most Recent: X (Date: -- )    Interpretation of Tool:  Represents activities that are increasingly more difficult (i.e. Bed mobility, Transfers, Gait). Medical Necessity:     Patient is expected to demonstrate progress in   strength, range of motion, and balance   to   decrease assistance required with exercises and functional mobility  . Reason for Services/Other Comments:  Patient   continues to require present interventions due to patient's inability to perform exercises and functional mobility independently  . Use of outcome tool(s) and clinical judgement create a POC that gives a: Clear prediction of patient's progress: LOW COMPLEXITY            TREATMENT:   (In addition to Assessment/Re-Assessment sessions the following treatments were rendered)     Pre-treatment Symptoms/Complaints:  none  Pain Initial:      Post Session:  0     Therapeutic Exercise: (8 Minutes):  Exercises per grid below to improve mobility and strength. Required minimal visual, verbal, and manual cues to promote proper body alignment, promote proper body posture, and promote proper body mechanics. Progressed range and repetitions as indicated. Date:  9/23 Date:   Date:     ACTIVITY/EXERCISE AM PM AM PM AM PM   GROUP THERAPY  []  []  []  []  []  []   Ankle Pumps  10a       Quad Sets  10a       Gluteal Sets  10a       Hip ABd/ADduction  10aa       Straight Leg Raises         Knee Slides  10aa       Short Arc Quads         Long Arc Quads         Chair Slides                  B = bilateral; AA = active assistive; A = active; P = passive      Treatment/Session Assessment:     Response to Treatment:  did fine, said hip felt much better than before surgery.     Education:  [x] Home Exercises  [x] Fall Precautions  [x] Hip Precautions [] D/C Instruction Review  [x] Hip Prosthesis Review  [x] Walker Management/Safety [] Adaptive Equipment as Needed       Interdisciplinary Collaboration:   Occupational Therapist  Registered Nurse    After treatment position/precautions:   Up in chair  Bed/Chair-wheels locked  Bed in low position  Call light within reach  RN notified    Compliance with Program/Exercises: Will assess as treatment progresses. Recommendations/Intent for next treatment session:  Treatment next visit will focus on increasing Mr. Nogueras independence with bed mobility, transfers, gait training, strength/ROM exercises, modalities for pain, and patient education.       Total Treatment Duration:  PT Patient Time In/Time Out  Time In: 1520  Time Out: Serena Negro 60, PT

## 2020-09-23 NOTE — PROGRESS NOTES
TRANSFER - IN REPORT:    Verbal report received from [facility] (name) on Natali Em  being received from PACU (unit) for routine progression of care      Report consisted of patients Situation, Background, Assessment and   Recommendations(SBAR). Information from the following report(s) SBAR, Kardex, Intake/Output and Recent Results was reviewed with the receiving nurse. Opportunity for questions and clarification was provided. Assessment will be completed upon patients arrival to unit and care assumed.

## 2020-09-23 NOTE — PROGRESS NOTES
Care Management Interventions  PCP Verified by CM: Yes  Mode of Transport at Discharge: ALS  Transition of Care Consult (CM Consult): [address]: Yes  Physical Therapy Consult: Yes  Current Support Network: Lives with Spouse  Confirm Follow Up Transport: Family  The Plan for Transition of Care is Related to the Following Treatment Goals : return to independent function  The Patient and/or Patient Representative was Provided with a Choice of Provider and Agrees with the Discharge Plan?: Yes  Freedom of Choice List was Provided with Basic Dialogue that Supports the Patient's Individualized Plan of Care/Goals, Treatment Preferences and Shares the Quality Data Associated with the Providers?: Yes  Discharge Location  Discharge Placement: Home with home health    Patient is a 69y.o. year old male admitted for revision of his Left SUSANA . Patient plans to return home on discharge. Order received to arrange home health. Patient without preference towards agency. Referral sent to [facility]. Patient denies any equipment needs as patient has a walker and raised toilet seat. Will follow until discharge.

## 2020-09-23 NOTE — OP NOTES
Revision Total Hip Procedure Note    Jd Morgan,  [id number],  1950    Pre-operative Diagnosis: Status post left hip replacement [Z96.642]  Dislocation of internal left hip prosthesis, sequela [T84.021S]        Post-operative Diagnosis: Same    Procedure: Revision Left Total Hip Arthroplasty    BMI: Body mass index is 29.94 kg/m². Ana Diaz Location: [address] [place] [address]    Surgeon: Crow Egan MD     Assistant: Marcos Jose CFA    Anesthesia:  Spinal    EBL: [id number]    Complications: None    Specimens: None    Drains: None    Condition: Stable    IMPLANTS:  Implant Name Type Inv. Item Serial No.  Lot No. LRB No. Used Action   LINER ACET SZ F ID46MM HIP X3 CO CHROM CEMENTLESS MOD 2 - [id number]  LINER ACET SZ F ID46MM HIP X3 CO CHROM CEMENTLESS MOD 2 [id number] [employer] HOW_What's Trending [id number] Left 1 Implanted   Wilda Pin   [id number]  [id number] Left 2 Implanted and Explanted   HEAD FEM [id number] +0MM OFFSET UNIV BIOLOX DELT CERAMIC TAPR - [id number]  HEAD FEM [id number] +0MM OFFSET Rudolpho Du CERAMIC TAPR [id number] [employer]What[employer] Trending [id number] Left 1 Implanted   ADAPTER SL +4MM OFFSET UNIV HIP TI V40 TAPR ACCOLADE II - [id number]  ADAPTER SL +4MM OFFSET UNIV HIP TI V40 TAPR ACCOLADE II [id number] [employer] [id number] Left 1 Implanted   INSERT ACET OD52MM ID28MM HIP X3 2 MOBILITY RESTR MOB BEAR - [id number]  [facility] H[facility] X3 2 MOBILITY RESTR MOB BEAR [id number] [employer] HOW_WD [id number] Left 1 Implanted       Description of Procedure:     Jd Morgan was brought to the operating room. Spinal anesthetic was induced. IV antibiotics were administered per protocol  and one gram of TXA was given prior to incision. Jd Morgan was positioned in the lateral decubitus position. The limb was prepped and draped in the usual sterile fashion.   A time out identifying the patient, procedure, operative side and surgeon was administered and charted by the OR Nurse. We incorporated the prior surgical incision in our approach. A posterior approach was utilized to expose the hip. The incision was carried through the subcutaneous tissue and underlying fascia with hemostasis obtained using the bovie cauterization and Aquamantys. Cauterization of bleeding vessels was accomplished with bovie cautery and Aquamantys. A Charmley retractor was inserted. The posterior capsule and overlying tissue was dissected off the posterior femur in one layer being sure to stay on bone during dissection. The sciatic nerve was palpated and protected during dissection. The femoral head was dislocated. The femoral head was removed. Acetabular retractors were placed and the prior poly component was removed. The wound was thoroughly irrigated with saline and a betadine soak was performed for 3 minutes. We did assess the femoral stem and acetabular component for loosening and found them to be stable. The final acetabular liner was placed which was a MDM liner. The permanent femoral head was impacted into place which was a +4mm 46mm MDM head. Dianna Counter Christy's hip was reduced and stability was as mentioned above. Dilute Betadine solution was allowed to sit in the surgical site for a 3 minute period and copious saline was used to irrigate the surgical site. The sciatic nerve was palpated and noted to be intact. A periarticular of ropivicaine, toradol, and morphine was injected about the surgical site with care being taken not to inject in the vicinity of neurovascular structures. Prior to wound closure one additional gram of TXA was given for a total of 2 grams. The capsule was repaired with a two #2 Fiberwires secured through drill holes placed in the posterior aspect of the trochanter. No drain was placed. The fascia was closed with a  #0 Bidirectional Stratafix barbed suture.  The sub-Q layer was closed with 2-0 Vicryl suture and a 2-0 Stratafix bidirectional barbed suture was used to close the skin. The incision site was thoroughly cleaned with alcohol and the Prineo wound closure system was applied to seal it off from external contamination after the overlying skin was thoroughly cleaned with alcohol. A thin layer of [place] lubricant was applied over the wound to keep the dressing from adhering to the overlying sterile bandage and said bandage was placed. Drapes were then broken down and patient was transferred carefully back to the OR stretcher. The sponge and needle counts were correct. The patient tolerated the procedure without difficulty and left the operating room in satisfactory condition.     Signed By: Eliud Resendiz MD

## 2020-09-24 ENCOUNTER — HOME CARE VISIT (OUTPATIENT)
Dept: HOME HEALTH SERVICES | Facility: HOME HEALTH | Age: 70
End: 2020-09-24
Payer: MEDICARE

## 2020-09-24 VITALS
RESPIRATION RATE: 16 BRPM | BODY MASS INDEX: 29.4 KG/M2 | OXYGEN SATURATION: 92 % | TEMPERATURE: 97.2 F | SYSTOLIC BLOOD PRESSURE: 131 MMHG | HEART RATE: 79 BPM | WEIGHT: 194 LBS | DIASTOLIC BLOOD PRESSURE: 82 MMHG | HEIGHT: 68 IN

## 2020-09-24 LAB
COVID-19 RAPID TEST, COVR: NOT DETECTED
HGB BLD-MCNC: 9.9 G/DL (ref 13.6–17.2)
SARS COV-2, XPGCVT: NEGATIVE
SOURCE, COVRS: NORMAL

## 2020-09-24 PROCEDURE — 97535 SELF CARE MNGMENT TRAINING: CPT

## 2020-09-24 PROCEDURE — 2709999900 HC NON-CHARGEABLE SUPPLY

## 2020-09-24 PROCEDURE — 74011000250 HC RX REV CODE- 250: Performed by: NURSE PRACTITIONER

## 2020-09-24 PROCEDURE — 85018 HEMOGLOBIN: CPT

## 2020-09-24 PROCEDURE — 3331090001 HH PPS REVENUE CREDIT

## 2020-09-24 PROCEDURE — 36415 COLL VENOUS BLD VENIPUNCTURE: CPT

## 2020-09-24 PROCEDURE — 94760 N-INVAS EAR/PLS OXIMETRY 1: CPT

## 2020-09-24 PROCEDURE — 74011250636 HC RX REV CODE- 250/636: Performed by: NURSE PRACTITIONER

## 2020-09-24 PROCEDURE — 77030041279 HC DRSG PRMSL AG MDII -B

## 2020-09-24 PROCEDURE — 74011250637 HC RX REV CODE- 250/637: Performed by: NURSE PRACTITIONER

## 2020-09-24 PROCEDURE — 97116 GAIT TRAINING THERAPY: CPT

## 2020-09-24 PROCEDURE — 97110 THERAPEUTIC EXERCISES: CPT

## 2020-09-24 PROCEDURE — 3331090002 HH PPS REVENUE DEBIT

## 2020-09-24 RX ADMIN — KETOROLAC TROMETHAMINE 15 MG: 15 INJECTION, SOLUTION INTRAMUSCULAR; INTRAVENOUS at 06:40

## 2020-09-24 RX ADMIN — HYDROMORPHONE HYDROCHLORIDE 2 MG: 2 TABLET ORAL at 06:40

## 2020-09-24 RX ADMIN — CEFAZOLIN SODIUM 2 G: 10 INJECTION, POWDER, FOR SOLUTION INTRAVENOUS at 03:45

## 2020-09-24 RX ADMIN — Medication 10 ML: at 06:00

## 2020-09-24 RX ADMIN — CYANOCOBALAMIN TAB 1000 MCG 1000 MCG: 1000 TAB at 08:40

## 2020-09-24 RX ADMIN — ACETAMINOPHEN 1000 MG: 500 TABLET, FILM COATED ORAL at 11:21

## 2020-09-24 RX ADMIN — MELOXICAM 7.5 MG: 7.5 TABLET ORAL at 08:40

## 2020-09-24 RX ADMIN — MULTIPLE VITAMINS W/ MINERALS TAB 1 TABLET: TAB at 08:39

## 2020-09-24 RX ADMIN — GABAPENTIN 100 MG: 100 CAPSULE ORAL at 08:39

## 2020-09-24 RX ADMIN — ASPIRIN 81 MG: 81 TABLET, COATED ORAL at 08:40

## 2020-09-24 RX ADMIN — Medication 1 AMPULE: at 08:38

## 2020-09-24 RX ADMIN — ACETAMINOPHEN 1000 MG: 500 TABLET, FILM COATED ORAL at 06:40

## 2020-09-24 RX ADMIN — PANTOPRAZOLE SODIUM 40 MG: 40 TABLET, DELAYED RELEASE ORAL at 06:40

## 2020-09-24 RX ADMIN — DOCUSATE SODIUM 50MG AND SENNOSIDES 8.6MG 2 TABLET: 8.6; 5 TABLET, FILM COATED ORAL at 08:39

## 2020-09-24 RX ADMIN — HYDROMORPHONE HYDROCHLORIDE 2 MG: 2 TABLET ORAL at 11:21

## 2020-09-24 NOTE — PROGRESS NOTES
Problem: Self Care Deficits Care Plan (Adult)  Goal: *Acute Goals and Plan of Care (Insert Text)  Outcome: Progressing Towards Goal  Note: GOALS:   DISCHARGE GOALS (in preparation for going home/rehab):  3 days  1. Mr. Gladys Neely will perform one lower body dressing activity with minimal assistance with adaptive equipment to demonstrate improved functional mobility and safety. GOAL MET 9/24/2020    2. Mr. Gladys Neely will perform one lower body bathing activity with minimal  assistance with adaptive equipment to demonstrate improved functional mobility and safety. GOAL MET 9/24/2020    3. Mr. Gladys Neely will perform toileting/toilet transfer with contact guard assistance with adaptive equipment to demonstrate improved functional mobility and safety. GOAL MET 9/24/2020    4. Mr. Gladys Neely will perform shower transfer with contact guard assistance with adaptive equipment to demonstrate improved functional mobility and safety. GOAL MET 9/24/2020    5. Mr. Gladys Neely will state SUSANA precautions with two verbal cues to demonstrate improved functional mobility and safety. GOAL MET 9/24/2020          JOINT CAMP OCCUPATIONAL THERAPY SUSANA: Daily Note, Discharge, Treatment Day: 2nd and AM 9/24/2020  INPATIENT: Hospital Day: 2  [place] / [place] / Product Type: Medicare /      NAME/AGE/GENDER: Evelyn Valles is a 69 y.o. male   PRIMARY DIAGNOSIS:  Status post left hip replacement [Z96.642]  Dislocation of internal left hip prosthesis, sequela [T84.021S]   Procedure(s) and Anesthesia Type:     * LEFT HIP ARTHROPLASTY TOTAL REVISION / PRISCILLA - General (Left)  ICD-10: Treatment Diagnosis:    · Pain in left hip (M25.552)  · Stiffness of Left Hip, Not elsewhere classified (M25.652)  · Other lack of cordination (R27.8)  · Difficulty in walking, Not elsewhere classified (R26.2)  · Other abnormalities of gait and mobility (R26.89)      ASSESSMENT:     Mr. Gladys Neely is s/p Left SUSANA REVISION due to multiple dislocations.  HE is min assist supine to sit and min for transfers. He is sitting up in recliner working with PT. He plans to discharge home with his wife to assist. He expressed no concerns and was educated on Hip precautions. Will see in am for full ADL session, he should progress well with ADL skills. 9/24/2020 945am Patient had some bleeding prior and nursing bandaged incision. He is sitting up in recliner. He stood with CGA and ambulated to the bathroom. He stood at the sink with SBA to brush teeth. He showered using shower chair and long handled sponge. He donned clean gown ambulated to recliner with CGA. He donned clothes and reported he has a reacher at home and  His wife will assist him. He is recliner in bedside chair. Nursing present. He would like to go home today. OT reminded him of hip precautions throughout session. Patient verbalized understanding. Discharge OT. This section established at most recent assessment   PROBLEM LIST (Impairments causing functional limitations):  1. Decreased Strength  2. Decreased ADL/Functional Activities  3. Decreased Transfer Abilities  4. Increased Pain  5. Increased Fatigue  6. Decreased Flexibility/Joint Mobility  7. Decreased Knowledge of Precautions   INTERVENTIONS PLANNED: (Benefits and precautions of occupational therapy have been discussed with the patient.)  1. Activities of daily living training  2. Adaptive equipment training  3. Balance training  4. Clothing management  5. Donning&doffing training  6. Theraputic activity     TREATMENT PLAN: Frequency/Duration: Follow patient 2 times to address above goals. Rehabilitation Potential For Stated Goals: Good     RECOMMENDED REHABILITATION/EQUIPMENT: (at time of discharge pending progress): Continue Skilled Therapy. OCCUPATIONAL PROFILE AND HISTORY:   History of Present Injury/Illness (Reason for Referral): Pt presents this date s/p (left) SUSANA.   Revision due to dislocation  Past Medical History/Comorbidities: Mr. Nilda Bolaños  has a past medical history of Anemia, Anxiety, Arthritis, Chronic pain, Cigar smoker, GERD (gastroesophageal reflux disease), History of colon cancer, Malignant neoplasm of ascending colon ([address] PUD (peptic ulcer disease) (1979), Rectal hemorrhage, Sinus tachycardia, and Weakness of right upper extremity. Mr. Nilda Bolaños  has a past surgical history that includes hx cataract removal (Bilateral, 2017); hx heent (1988); hx cervical fusion (2013); hx lumbar fusion (1992); hx colonoscopy; hx colectomy (Right, 07/2019); and hx hip replacement (Left, 07/2020). Social History/Living Environment:   Home Environment: Private residence  # Steps to Enter: 0  Rails to Enter: No  One/Two Story Residence: One story  Living Alone: No  Support Systems: Spouse/Significant Other/Partner  Patient Expects to be Discharged to[de-identified] Private residence  Current DME Used/Available at Home: Crutches  Tub or Shower Type: Shower  Prior Level of Function/Work/Activity:  Mod I used crutches and wore knee immobilizer     Number of Personal Factors/Comorbidities that affect the Plan of Care: Brief history (0):  LOW COMPLEXITY   ASSESSMENT OF OCCUPATIONAL PERFORMANCE[de-identified]   Most Recent Physical Functioning:   Balance  Sitting: Intact  Standing: With support; Without support  Standing - Static: Good  Standing - Dynamic : Fair                              Mental Status  Neurologic State: Alert; Appropriate for age  Orientation Level: Appropriate for age  Cognition: Appropriate decision making; Appropriate for age attention/concentration; Appropriate safety awareness; Follows commands  Perception: Appears intact  Perseveration: No perseveration noted  Safety/Judgement: Awareness of environment; Fall prevention                Basic ADLs (From Assessment) Complex ADLs (From Assessment)   Basic ADL  Feeding: Supervision  Oral Facial Hygiene/Grooming: Supervision  Bathing: Minimum assistance, Moderate assistance  Type of Bath: Chlorhexidine (CHG), Shower  Upper Body Dressing: Supervision  Lower Body Dressing: Moderate assistance  Toileting: Minimum assistance     Grooming/Bathing/Dressing Activities of Daily Living   Grooming  Grooming Assistance: Supervision;Stand-by assistance  Position Performed: Standing  Washing Face: Supervision;Stand-by assistance  Washing Hands: Supervision;Stand-by assistance  Brushing Teeth: Supervision;Stand-by assistance Cognitive Retraining  Safety/Judgement: Awareness of environment; Fall prevention   Upper Body Bathing  Bathing Assistance: Supervision;Stand-by assistance  Position Performed: Seated in chair;Standing  Adaptive Equipment: Grab bar; Shower chair     Lower Body Bathing  Bathing Assistance: Minimum assistance  Perineal  : Stand-by assistance  Position Performed: Standing  Adaptive Equipment: Grab bar  Lower Body : Minimum assistance  Position Performed: Seated in chair;Standing  Adaptive Equipment: Grab bar;Long handled sponge; Shower chair     Upper Body Dressing Assistance  Dressing Assistance: Supervision  Pullover Shirt: Supervision Functional Transfers  Bathroom Mobility: Contact guard assistance  Toilet Transfer : Contact guard assistance  Shower Transfer: Contact guard assistance  Adaptive Equipment: Grab bars; Shower chair with back   Lower Body Dressing Assistance  Dressing Assistance: Minimum assistance  Underpants: Minimum assistance  Pants With Elastic Waist: Minimum assistance  Socks: Minimum assistance Bed/Mat Mobility  Supine to Sit: Minimum assistance  Sit to Stand: Contact guard assistance  Stand to Sit: Contact guard assistance         Physical Skills Involved:  1. Balance  2. Activity Tolerance Cognitive Skills Affected (resulting in the inability to perform in a timely and safe manner): 1. none Psychosocial Skills Affected:  1.  Self-Awareness   Number of elements that affect the Plan of Care: 1-3:  LOW COMPLEXITY   CLINICAL DECISION MAKIN [facility] [address] [id number] AM-PAC 6 Clicks   Daily Activity Inpatient Short Form  How much help from another person does the patient currently need. .. Total A Lot A Little None   1. Putting on and taking off regular lower body clothing? [] 1   [] 2   [x] 3   [] 4   2. Bathing (including washing, rinsing, drying)? [] 1   [] 2   [x] 3   [] 4   3. Toileting, which includes using toilet, bedpan or urinal?   [] 1   [] 2   [x] 3   [] 4   4. Putting on and taking off regular upper body clothing? [] 1   [] 2   [] 3   [x] 4   5. Taking care of personal grooming such as brushing teeth? [] 1   [] 2   [] 3   [x] 4   6. Eating meals? [] 1   [] 2   [] 3   [x] 4   © 2007, Trustees of [address] [place] [address] [address] [address], under license to [employer]. All rights reserved     Score:  Initial:19 Most Recent:21 discharge 9/24/2020    Interpretation of Tool:  Represents activities that are increasingly more difficult (i.e. Bed mobility, Transfers, Gait). Use of outcome tool(s) and clinical judgement create a POC that gives a: LOW COMPLEXITY            TREATMENT:   (In addition to Assessment/Re-Assessment sessions the following treatments were rendered)     Pre-treatment Symptoms/Complaints:  \  Pain: Initial:   Pain Intensity 1: 0  Post Session:  0/10  rest     Self Care: (40): Procedure(s) (per grid) utilized to improve and/or restore self-care/home management as related to dressing, bathing, toileting and grooming. Required minimal visual, verbal, manual and tactile cueing to facilitate activities of daily living skills, compensatory activities and hip precautions. Treatment/Session Assessment:     Response to Treatment:  tolerated well, up in recliner.     Education:  [] Home Exercises  [x] Fall Precautions  [x] Hip Precautions [] Going Home Video  [] Knee/Hip Prosthesis Review  [x] Walker Management/Safety [x] Adaptive Equipment as Needed       Interdisciplinary Collaboration:   o Physical Therapist  o Occupational Therapist  o Registered Nurse    After treatment position/precautions:   o Up in chair  o Bed/Chair-wheels locked  o Call light within reach  o RN notified  o Nurse at bedside     Compliance with Program/Exercises: Compliant all of the time. Recommendations/Intent for next treatment session:   Pt doing well all goals met and will do well at home with support from spouse. Patient will be discharged home with home health PT. No further Occupational Therapy warranted, will discharge Occupational Therapy services.         Total Treatment Duration:40  OT Patient Time In/Time Out  Time In: 0945  Time Out: [address],  [address], OT

## 2020-09-24 NOTE — PROGRESS NOTES
When getting up for therapy. Had moderate amt of bleesing from the middle of incision. Placed gauze and tegaderm to reinforce dressing. Edges continue to be well approximated. Clear dressing remains over incision from surgery.

## 2020-09-24 NOTE — PROGRESS NOTES
Problem: Mobility Impaired (Adult and Pediatric)  Goal: *Acute Goals and Plan of Care (Insert Text)  Outcome: Progressing Towards Goal  Note: GOALS (1-4 days):  (1.)Mr. Claudell Greathouse will move from supine to sit and sit to supine  in bed with STAND BY ASSIST.    (2.)Mr. Claudell Greathouse will transfer from bed to chair and chair to bed with STAND BY ASSIST using the least restrictive device. (3.)Mr. Claudell Greathouse will ambulate with STAND BY ASSIST for 150 feet with the least restrictive device. Distance met with CGA 9/24/20. (4.)Mr. Claudell Greathouse will ambulate up/down 1 steps with bilateral  railing with MINIMAL ASSIST or with device. (5.)Mr. Claudell Greathouse will state/observe VALENTINO precautions with 0 verbal cues. ________________________________________________________________________________________________       PHYSICAL THERAPY JOINT CAMP VALENTINO: Daily Note and AM 9/24/2020  INPATIENT: Hospital Day: 2  [place] / Product Type: Medicare /      NAME/AGE/GENDER: Alex Bland is a 69 y.o. male   PRIMARY DIAGNOSIS:  Status post left hip replacement [Z96.642]  Dislocation of internal left hip prosthesis, sequela [T84.021S]   Procedure(s) and Anesthesia Type:     * LEFT HIP ARTHROPLASTY TOTAL REVISION / PRISCILLA - General (Left)  ICD-10: Treatment Diagnosis:    · Pain in left hip (M25.552)  · Stiffness of Left Hip, Not elsewhere classified (M25.652)  · Difficulty in walking, Not elsewhere classified (R26.2)      ASSESSMENT:     Mr. Claudell Greathouse presents with decreased functional mobility and gait as well as decreased rom and strength of left LE s/p left valentino revision. He plans to go home with HHPT. Patient bleeding at surgical site upon contact. RN notified and came in to change dressing. Reviewed VALENTINO precautions in detail. Patient demonstrates fair to good understanding. Patient demonstrated increased gait distance this morning and required less assist for functional mobility. Increased exercise reps to 15. Progressing toward goals. This section established at most recent assessment   PROBLEM LIST (Impairments causing functional limitations):  1. Decreased Strength  2. Decreased ADL/Functional Activities  3. Decreased Transfer Abilities  4. Decreased Ambulation Ability/Technique  5. Decreased Balance  6. Increased Pain  7. Decreased Activity Tolerance  8. Decreased Flexibility/Joint Mobility  9. Decreased [place] with Home Exercise Program  10. Decreased strength   INTERVENTIONS PLANNED: (Benefits and precautions of physical therapy have been discussed with the patient.)  1. Bed Mobility  2. Cold  3. Gait Training  4. Home Exercise Program (HEP)  5. Range of Motion (ROM)  6. Therapeutic Activites  7. Therapeutic Exercise/Strengthening  8. Transfer Training     TREATMENT PLAN: Frequency/Duration: Follow patient BID for duration of hospital stay to address above goals. Rehabilitation Potential For Stated Goals: Good     RECOMMENDED REHABILITATION/EQUIPMENT: (at time of discharge pending progress): Continue Skilled Therapy and Home Health: Physical Therapy. HISTORY:   History of Present Injury/Illness (Reason for Referral):  S/p left valentino revision  Past Medical History/Comorbidities:   Mr. Diane Garcia  has a past medical history of Anemia, Anxiety, Arthritis, Chronic pain, Cigar smoker, GERD (gastroesophageal reflux disease), History of colon cancer, Malignant neoplasm of ascending colon ([facility] [address] PUD (peptic ulcer disease) (1979), Rectal hemorrhage, Sinus tachycardia, and Weakness of right upper extremity. Mr. Diane Garcia  has a past surgical history that includes hx cataract removal (Bilateral, 2017); hx heent (1988); hx cervical fusion (2013); hx lumbar fusion (1992); hx colonoscopy; hx colectomy (Right, 07/2019); and hx hip replacement (Left, 07/2020).    Social History/Living Environment:   Home Environment: Private residence  # Steps to Enter: 0  Rails to Enter: No  One/Two Story Residence: One story  Living Alone: No  Support Systems: Spouse/Significant Other/Partner  Patient Expects to be Discharged to[de-identified] Private residence  Current DME Used/Available at Home: Crutches  Tub or Shower Type: Shower    Prior Level of Function/Work/Activity:  Was using [employer] and crutches due to dislocations   Number of Personal Factors/Comorbidities that affect the Plan of Care: 1-2: MODERATE COMPLEXITY   EXAMINATION:   Most Recent Physical Functioning:                 LLE AROM  L Hip Flexion: 85(hip precautions)          Bed Mobility  Supine to Sit: Contact guard assistance    Transfers  Sit to Stand: Contact guard assistance  Stand to Sit: Stand-by assistance;Contact guard assistance  Bed to Chair: Contact guard assistance    Balance  Sitting: Intact  Standing: With support  Standing - Static: Good  Standing - Dynamic : Fair              Weight Bearing Status  Left Side Weight Bearing: As tolerated  Distance (ft): 200 Feet (ft)  Ambulation - Level of Assistance: Contact guard assistance  Assistive Device: Walker, rolling  Speed/She: Delayed  Step Length: Left shortened;Right shortened  Stance: Left decreased  Gait Abnormalities: Antalgic;Decreased step clearance  Interventions: Manual cues; Safety awareness training;Verbal cues     Braces/Orthotics: none    Left Hip Cold  Type: Cold/ice pack      Body Structures Involved:  1. Bones  2. Joints  3. Muscles  4. Ligaments Body Functions Affected:  1. Movement Related Activities and Participation Affected:  1. Mobility   Number of elements that affect the Plan of Care: 3: MODERATE COMPLEXITY   CLINICAL PRESENTATION:   Presentation: Stable and uncomplicated: LOW COMPLEXITY   CLINICAL DECISION MAKIN [facility] [address] [address] AM-PAC 6 Clicks   Basic Mobility Inpatient Short Form  How much difficulty does the patient currently have. .. Unable A Lot A Little None   1. Turning over in bed (including adjusting bedclothes, sheets and blankets)? [] 1   [] 2   [x] 3   [] 4   2.   Sitting down on and standing up from a chair with arms ( e.g., wheelchair, bedside commode, etc.)   [] 1   [] 2   [x] 3   [] 4   3. Moving from lying on back to sitting on the side of the bed? [] 1   [] 2   [x] 3   [] 4   How much help from another person does the patient currently need. .. Total A Lot A Little None   4. Moving to and from a bed to a chair (including a wheelchair)? [] 1   [] 2   [x] 3   [] 4   5. Need to walk in hospital room? [] 1   [] 2   [x] 3   [] 4   6. Climbing 3-5 steps with a railing? [] 1   [x] 2   [] 3   [] 4   © 2007, Trustees of [address] [place] [address] [address] [address], under license to [employer]. All rights reserved     Score:  Initial: 17 Most Recent: X (Date: -- )    Interpretation of Tool:  Represents activities that are increasingly more difficult (i.e. Bed mobility, Transfers, Gait). Medical Necessity:     · Patient is expected to demonstrate progress in   · strength, range of motion, and balance  ·  to   · decrease assistance required with exercises and functional mobility  · . Reason for Services/Other Comments:  · Patient   · continues to require present interventions due to patient's inability to perform exercises and functional mobility independently  · . Use of outcome tool(s) and clinical judgement create a POC that gives a: Clear prediction of patient's progress: LOW COMPLEXITY            TREATMENT:   (In addition to Assessment/Re-Assessment sessions the following treatments were rendered)     Pre-treatment Symptoms/Complaints:  L hip pain  Pain Initial:   Pain Intensity 1: 5  Pain Location 1: Hip  Pain Orientation 1: Left  Pain Intervention(s) 1: Repositioned  Post Session:  5     Therapeutic Exercise: (12 Minutes):  Exercises per grid below to improve mobility and strength. Required minimal visual, verbal, and manual cues to promote proper body alignment, promote proper body posture, and promote proper body mechanics. Progressed range and repetitions as indicated.  Gait Training (13 Minutes): Gait training to improve and/or restore physical functioning as related to mobility and strength. Ambulated 200 Feet (ft) with Contact guard assistance using a Walker, rolling and minimal Manual cues; Safety awareness training;Verbal cues related to their hip position and motion to promote proper body mechanics and maintain hip precautions. Date:  9/23 Date:  9/24/20 Date:     ACTIVITY/EXERCISE AM PM AM PM AM PM   GROUP THERAPY  []  []  []  []  []  []   Ankle Pumps  10a 15      Quad Sets  10a 15      Gluteal Sets  10a 15      Hip ABd/ADduction  10aa 15AA      Straight Leg Raises         Knee Slides  10aa 15AA      Short Arc Quads   15       Long Arc Quads   15      Chair Slides                  B = bilateral; AA = active assistive; A = active; P = passive      Treatment/Session Assessment:     Response to Treatment:  Tolerated well. Education:  [x] Home Exercises  [x] Fall Precautions  [x] Hip Precautions [x] D/C Instruction Review  [x] Hip Prosthesis Review  [x] Walker Management/Safety [] Adaptive Equipment as Needed       Interdisciplinary Collaboration:   o Physical Therapist  o Registered Nurse    After treatment position/precautions:   o Up in chair  o Bed/Chair-wheels locked  o Bed in low position  o Call light within reach  o RN notified    Compliance with Program/Exercises: Compliant most of the time. Recommendations/Intent for next treatment session:  Treatment next visit will focus on increasing Mr. Harrison's independence with bed mobility, transfers, gait training, strength/ROM exercises, modalities for pain, and patient education.       Total Treatment Duration:  PT Patient Time In/Time Out  Time In: 0915  Time Out: [address], PT

## 2020-09-24 NOTE — PROGRESS NOTES
09/23/20 2042   Oxygen Therapy   O2 Sat (%) 96 %   Pulse via Oximetry 83 beats per minute   O2 Device Nasal cannula   O2 Flow Rate (L/min) 2 l/min   Patient placed on continuous oximetry with no complications noted

## 2020-09-24 NOTE — PROGRESS NOTES
Shift assessment complete. Pt a/ox4 and resting in bed. Dressing to left hip c/d/i with ice in place. Strong bilateral plantar/dorsiflexion with pedal pulses present and palpable +2. SCDs in place. IV site c/d/i, patent and capped. Bed in lowest position, wheels locked, side rails x3, gripper socks on, and call light in reach. Encouraged to call for help if needed and pt verbalized understanding.

## 2020-09-24 NOTE — DISCHARGE INSTRUCTIONS
[facility]   Patient Discharge Instructions    Zbigniew Ontiveros / [id number] : 1950    Admitted 2020 Discharged: 2020     IF YOU HAVE ANY PROBLEMS ONCE YOU ARE AT HOME CALL THE FOLLOWING NUMBERS:   Main office number: [phone]    Take Home Medications     · It is important that you take the medication exactly as they are prescribed. · Keep your medication in the bottles provided by the pharmacist and keep a list of the medication names, dosages, and times to be taken in your wallet. · Do not take other medications without consulting your doctor. What to do at [address] your prehospital diet. If you have excessive nausea or vomitting call your doctor's office     Home Physical Therapy is arranged. Use rolling walker when walking. Patients who have had a joint replacement should not drive until you are seen for your follow up appointment by Dr. Sulma Escalante. When to Call    - Call if you have a temperature greater then 101  - Unable to keep food down  - Loose control of your bladder or bowel function  - Are unable to bear any weight   - Need a pain medication refill       DISCHARGE SUMMARY from Nurse    The following personal items collected during your admission are returned to you:   Dental Appliance: Dental Appliances: None  Vision: Visual Aid: None  Hearing Aid:    Jewelry: Jewelry: None  Clothing: Clothing: Shirt, Shorts, Footwear, Undergarments  Other Valuables: Other Valuables: None  Valuables sent to safe:      PATIENT INSTRUCTIONS:    After general anesthesia or intravenous sedation, for 24 hours or while taking prescription Narcotics:  · Limit your activities  · Do not drive and operate hazardous machinery  · Do not make important personal or business decisions  · Do  not drink alcoholic beverages  · If you have not urinated within 8 hours after discharge, please contact your surgeon on call.     Report the following to your surgeon:  · Excessive pain, swelling, redness or odor of or around the surgical area  · Temperature over 101  · Nausea and vomiting lasting longer than 4 hours or if unable to take medications  · Any signs of decreased circulation or nerve impairment to extremity: change in color, persistent  numbness, tingling, coldness or increase pain  · Any questions, call office @ [phone]      Keep scheduled follow up appointment. If need to change, call office @ [phone]. *  Please give a list of your current medications to your Primary Care Provider. *  Please update this list whenever your medications are discontinued, doses are      changed, or new medications (including over-the-counter products) are added. *  Please carry medication information at all times in case of emergency situations. Patient Education        Hip Replacement Surgery (Posterior): What to Expect at Home  Your Recovery  Hip replacement surgery replaces the worn parts of your hip joint. When you leave the hospital, you will probably be walking with crutches or a walker. You may be able to climb a few stairs and get in and out of bed and chairs. But you will need someone to help you at home for the next few weeks or until you have more energy and can move around better. If you need more extensive rehab, you may go to a specialized rehab center for more treatment. You will go home with a bandage and stitches, staples, tissue glue, or tape strips. You can remove the bandage when your doctor tells you to. If you have stitches or staples, your doctor will remove them 10 days to 3 weeks after your surgery. Glue or tape strips will fall off on their own over time. You may still have some mild pain, and the area may be swollen for 3 to 4 months after surgery. Your doctor will give you medicine for the pain.   You will continue the rehabilitation program (rehab) you started in the hospital. The better you do with your rehab exercises, the sooner you will get your strength and movement back. Most people are able to return to work 4 weeks to 4 months after surgery. This care sheet gives you a general idea about how long it will take for you to recover. But each person recovers at a different pace. Follow the steps below to get better as quickly as possible. How can you care for yourself at home? Activity    · Your doctor may not want your affected leg to cross the center of your body toward the other leg. If so, your therapist may suggest these ideas:  ? Do not cross your legs. ? Be very careful as you get in or out of bed or a car, so your leg does not cross that imaginary line in the middle of your body.     · Go slowly when you climb stairs. Make sure the lights are on. Have someone watch you, if you can. When you climb stairs:  ? Step up first with your unaffected leg. Then bring the affected leg up to the same step. Bring your crutches or cane up. ? To go down stairs, reverse the order. First, put your crutches or cane on the lower step. Then bring the affected leg down to that step. Finally, step down with the unaffected leg.     · You can ride in a car, but stop at least once every hour to get out and walk around.     · You may want to sleep on your back. Don't reach down too far to pull up blankets when you lie in bed.     · If your doctor recommends exercises, do them as directed. You can cut back on your exercises if your muscles start to ache, but don't stop doing them.     · Rest when you feel tired. You may take a nap, but don't stay in bed all day.     · Work with your physical therapist to learn the best way to exercise. You will probably have to use crutches or a walker for at least 4 to 6 weeks.     · Your doctor may advise you to stay away from activities that put stress on the joint. This includes sports such as tennis, football, and jogging.     · Try not to sit for too long at one time. You will feel less stiff if you take a short walk about every hour.  When you sit, use chairs with arms, and don't sit in low chairs.     · Do not bend over more than 90 degrees (like the angle in a letter \"L\").     · Sleep on your back with your legs slightly apart or on your side with a pillow between your knees for about 6 weeks or as your doctor tells you. Do not sleep on your stomach or affected leg.     · Ask your doctor when you can drive again.     · Most people are able to return to work 4 weeks to 4 months after surgery.     · Ask your doctor when it is okay for you to have sex. Diet    · By the time you leave the hospital, you will probably be eating your normal diet. If your stomach is upset, try bland, low-fat foods like plain rice, broiled chicken, toast, and yogurt. Your doctor may recommend that you take iron and vitamin supplements.     · Drink plenty of fluids (unless your doctor tells you not to).   · Eat healthy foods, and watch your portion sizes. Try to stay at your ideal weight. Too much weight puts more stress on your new hip joint.     · You may notice that your bowel movements are not regular right after your surgery. This is common. Try to avoid constipation and straining with bowel movements. You may want to take a fiber supplement every day. If you have not had a bowel movement after a couple of days, ask your doctor about taking a mild laxative. Medicines    · Your doctor will tell you if and when you can restart your medicines. He or she will also give you instructions about taking any new medicines.     · If you take aspirin or some other blood thinner, ask your doctor if and when to start taking it again. Make sure that you understand exactly what your doctor wants you to do.     · Your doctor may give you a blood-thinning medicine to prevent blood clots. If you take a blood thinner, be sure you get instructions about how to take your medicine safely. Blood thinners can cause serious bleeding problems.  This medicine could be in pill form or as a shot (injection). If a shot is necessary, your doctor will tell you how to do this.     · Be safe with medicines. Take pain medicines exactly as directed. ? If the doctor gave you a prescription medicine for pain, take it as prescribed. ? If you are not taking a prescription pain medicine, ask your doctor if you can take an over-the-counter medicine.     · If you think your pain medicine is making you sick to your stomach:  ? Take your medicine after meals (unless your doctor has told you not to). ? Ask your doctor for a different pain medicine.     · If your doctor prescribed antibiotics, take them as directed. Do not stop taking them just because you feel better. You need to take the full course of antibiotics. Incision care    · If your doctor told you how to care for your cut (incision), follow your doctor's instructions. You will have a dressing over the cut. A dressing helps the incision heal and protects it. Your doctor will tell you how to take care of this.     · If you did not get instructions, follow this general advice:  ? If you have strips of tape on the cut the doctor made, leave the tape on for a week or until it falls off.  ? If you have stitches or staples, your doctor will tell you when to come back to have them removed. ? If you have skin adhesive on the cut, leave it on until it falls off. Skin adhesive is also called glue or liquid stitches. ? Change the bandage every day. ? Wash the area daily with warm water, and pat it dry. Don't use hydrogen peroxide or alcohol. They can slow healing. ? You may cover the area with a gauze bandage if it oozes fluid or rubs against clothing. ? You may shower 24 to 48 hours after surgery. Pat the incision dry. Don't swim or take a bath for the first 2 weeks, or until your doctor tells you it is okay. Exercise    · Your rehab program will include a number of exercises to do. Always do them as your therapist tells you.    Ice and elevation    · For pain, put ice or a cold pack on the area for 10 to 20 minutes at a time. Put a thin cloth between the ice and your skin.     · Your ankle may swell for about 3 months. Prop up your ankle when you ice it or anytime you sit or lie down. Try to keep it above the level of your heart. This will help reduce swelling. Other instructions    · Continue to wear your support stockings as your doctor says. These help to prevent blood clots. How long you'll have to wear them depends on your activity level and the amount of swelling you have. Most people wear these stockings for 4 to 6 weeks after surgery.     · Try to prevent falls. To avoid falling:  ? Arrange furniture so that you will not trip on it. ? Get rid of throw rugs, and move electrical cords out of the way. ? Walk only in areas with plenty of light. ? Put grab bars in showers and bathtubs. ? Avoid icy or snowy sidewalks. ? Wear shoes with sturdy, flat soles. Follow-up care is a key part of your treatment and safety. Be sure to make and go to all appointments, and call your doctor if you are having problems. It's also a good idea to know your test results and keep a list of the medicines you take. When should you call for help? Call 911 anytime you think you may need emergency care. For example, call if:    · You passed out (lost consciousness).     · You have severe trouble breathing.     · You have sudden chest pain and shortness of breath, or you cough up blood. Call your doctor now or seek immediate medical care if:    · You have signs that your hip may be dislocated, including:  ? Severe pain and not being able to stand. ? A crooked leg that looks like your hip is out of position. ? Not being able to bend or straighten your leg.     · Your leg or foot is cool or pale or changes color.     · You cannot feel or move your leg.     · You have signs of a blood clot, such as:  ? Pain in your calf, back of the knee, thigh, or groin. ?  Redness and swelling in your leg or groin.     · Your incision comes open and begins to bleed, or the bleeding increases.     · You feel like your heart is racing or beating irregularly.     · You have signs of infection, such as:  ? Increased pain, swelling, warmth, or redness. ? Red streaks leading from the incision. ? Pus draining from the incision. ? A fever. Watch closely for changes in your health, and be sure to contact your doctor if:    · You do not have a bowel movement after taking a laxative.     · You do not get better as expected. Where can you learn more? Go to http://aries-samir.info/  Enter Q746 in the search box to learn more about \"Hip Replacement Surgery (Posterior): What to Expect at Home. \"  Current as of: March 2, 2020               Content Version: 12.6  © 9794-8698 [employer]. Care instructions adapted under license by [employer] (which disclaims liability or warranty for this information). If you have questions about a medical condition or this instruction, always ask your healthcare professional. [place] [address] any warranty or liability for your use of this information. These are general instructions for a healthy lifestyle:    No smoking/ No tobacco products/ Avoid exposure to second hand smoke    Surgeon General's Warning:  Quitting smoking now greatly reduces serious risk to your health. Obesity, smoking, and sedentary lifestyle greatly increases your risk for illness    A healthy diet, regular physical exercise & weight monitoring are important for maintaining a healthy lifestyle    You may be retaining fluid if you have a history of heart failure or if you experience any of the following symptoms:  Weight gain of 3 pounds or more overnight or 5 pounds in a week, increased swelling in our hands or feet or shortness of breath while lying flat in bed.   Please call your doctor as soon as you notice any of these symptoms; do not wait until your next office visit. Recognize signs and symptoms of STROKE:    F-face looks uneven    A-arms unable to move or move even    S-speech slurred or non-existent    T-time-call 911 as soon as signs and symptoms begin-DO NOT go       Back to bed or wait to see if you get better-TIME IS BRAIN. The discharge information has been reviewed with the patient. The patient verbalized understanding. Information obtained by :  I understand that if any problems occur once I am at home I am to contact my physician. I understand and acknowledge receipt of the instructions indicated above.                                                                                                                                            Physician's or R.N.'s Signature                                                                  Date/Time                                                                                                                                              Patient or Representative Signature                                                          Date/Time

## 2020-09-24 NOTE — PROGRESS NOTES
DISCHARGE SUMMARY from Nurse    PATIENT INSTRUCTIONS:    After general anesthesia or intravenous sedation, for 24 hours or while taking prescription Narcotics:  · Limit your activities  · Do not drive and operate hazardous machinery  · Do not make important personal or business decisions  · Do  not drink alcoholic beverages  · If you have not urinated within 8 hours after discharge, please contact your surgeon on call. Report the following to your surgeon:  · Excessive pain, swelling, redness or odor of or around the surgical area  · Temperature over 100.5  · Nausea and vomiting lasting longer than 4 hours or if unable to take medications  · Any signs of decreased circulation or nerve impairment to extremity: change in color, persistent  numbness, tingling, coldness or increase pain  · Any questions    What to do at Home:  Recommended activity: Activity as tolerated and See surgical instructions,     *  Please give a list of your current medications to your Primary Care Provider. *  Please update this list whenever your medications are discontinued, doses are      changed, or new medications (including over-the-counter products) are added. *  Please carry medication information at all times in case of emergency situations. These are general instructions for a healthy lifestyle:    No smoking/ No tobacco products/ Avoid exposure to second hand smoke  Surgeon General's Warning:  Quitting smoking now greatly reduces serious risk to your health.     Obesity, smoking, and sedentary lifestyle greatly increases your risk for illness    A healthy diet, regular physical exercise & weight monitoring are important for maintaining a healthy lifestyle    You may be retaining fluid if you have a history of heart failure or if you experience any of the following symptoms:  Weight gain of 3 pounds or more overnight or 5 pounds in a week, increased swelling in our hands or feet or shortness of breath while lying flat in bed.  Please call your doctor as soon as you notice any of these symptoms; do not wait until your next office visit. The discharge information has been reviewed with the patient and spouse. The patient and spouse verbalized understanding. Discharge medications reviewed with the patient and spouse and appropriate educational materials and side effects teaching were provided.   ___________________________________________________________________________________________________________________________________

## 2020-09-24 NOTE — PROGRESS NOTES
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 No acute events. Objective:   Visit Vitals  /63 (BP Patient Position: At rest)   Pulse (!) 59   Temp 97.6 °F (36.4 °C)   Resp 16   Ht 5' 7.5\" (1.715 m)   Wt 88 kg (194 lb)   SpO2 99%   BMI 29.94 kg/m²    Temp (24hrs), Av.4 °F (36.9 °C), Min:97.2 °F (36.2 °C), Max:101.5 °F (38.6 °C)    Lab Results   Component Value Date/Time    HGB 9.9 (L) 2020 03:55 AM     Extremity Exam  Dressing clean and dry   Tibialis Anterior and Gastroc-Soleus functioning normally left lower extremity  Sensation intact to light touch on operative limb  Extremity perfused  TEDS/SCDS in place  No sign of DVT     Assessment / Plan :  WBAT LLE  Continue PT/OT  Continue current DVT prophylaxis in house.  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

## 2020-09-24 NOTE — PROGRESS NOTES
Problem: Falls - Risk of  Goal: *Absence of Falls  Description: Document Juhi Lubin Fall Risk and appropriate interventions in the flowsheet.   Outcome: Progressing Towards Goal  Note: Fall Risk Interventions:  Mobility Interventions: OT consult for ADLs, Patient to call before getting OOB, PT Consult for mobility concerns, PT Consult for assist device competence, Strengthening exercises (ROM-active/passive), Utilize walker, cane, or other assistive device         Medication Interventions: Assess postural VS orthostatic hypotension, Teach patient to arise slowly, Patient to call before getting OOB    Elimination Interventions: Call light in reach, Patient to call for help with toileting needs, Toileting schedule/hourly rounds              Problem: Hip Replacement: Day of Surgery/Unit  Goal: Activity/Safety  Outcome: Progressing Towards Goal  Goal: Consults, if ordered  Outcome: Progressing Towards Goal  Goal: Diagnostic Test/Procedures  Outcome: Progressing Towards Goal  Goal: Nutrition/Diet  Outcome: Progressing Towards Goal  Goal: Medications  Outcome: Progressing Towards Goal  Goal: Respiratory  Outcome: Progressing Towards Goal  Goal: Treatments/Interventions/Procedures  Outcome: Progressing Towards Goal  Goal: *Initiate mobility  Outcome: Progressing Towards Goal  Goal: *Optimal pain control at patient's stated goal  Outcome: Progressing Towards Goal  Goal: *Hemodynamically stable  Outcome: Progressing Towards Goal Hyperbilirubinemia guideline

## 2020-09-24 NOTE — PROGRESS NOTES
Notified MD of bloody drainage from incision and saturated guaze. Order to place Aquacel dressing over the clear dressing that was left after surgery. Done as ordered. Guaze and tegaderm dressing removed. Instructed pt to notify md if drainage continued.

## 2020-09-25 ENCOUNTER — HOME CARE VISIT (OUTPATIENT)
Dept: HOME HEALTH SERVICES | Facility: HOME HEALTH | Age: 70
End: 2020-09-25
Payer: MEDICARE

## 2020-09-25 ENCOUNTER — PATIENT OUTREACH (OUTPATIENT)
Dept: CASE MANAGEMENT | Age: 70
End: 2020-09-25

## 2020-09-25 VITALS
SYSTOLIC BLOOD PRESSURE: 124 MMHG | TEMPERATURE: 98.2 F | DIASTOLIC BLOOD PRESSURE: 71 MMHG | RESPIRATION RATE: 16 BRPM | HEART RATE: 64 BPM

## 2020-09-25 PROCEDURE — 3331090001 HH PPS REVENUE CREDIT

## 2020-09-25 PROCEDURE — 400013 HH SOC

## 2020-09-25 PROCEDURE — G0151 HHCP-SERV OF PT,EA 15 MIN: HCPCS

## 2020-09-25 PROCEDURE — 3331090002 HH PPS REVENUE DEBIT

## 2020-09-25 NOTE — PROGRESS NOTES
Initial RD outreach attempt to patient's mobile number was unsuccessful. Left message to return call. Will attempt second outreach within 24 hours.

## 2020-09-25 NOTE — PROGRESS NOTES
Second RD outreach attempt made to mobile numbers. Left message to return calls. Unable to reach for [facility] program, will close case. Will reopen if call is returned.

## 2020-09-26 PROCEDURE — 3331090002 HH PPS REVENUE DEBIT

## 2020-09-26 PROCEDURE — 3331090001 HH PPS REVENUE CREDIT

## 2020-09-27 PROCEDURE — 3331090001 HH PPS REVENUE CREDIT

## 2020-09-27 PROCEDURE — 3331090002 HH PPS REVENUE DEBIT

## 2020-09-28 PROCEDURE — A6199 ALGINATE DRSG WOUND FILLER: HCPCS

## 2020-09-28 PROCEDURE — 3331090002 HH PPS REVENUE DEBIT

## 2020-09-28 PROCEDURE — 3331090001 HH PPS REVENUE CREDIT

## 2020-09-29 PROCEDURE — 3331090002 HH PPS REVENUE DEBIT

## 2020-09-29 PROCEDURE — 3331090003 HH PPS REVENUE ADJ

## 2020-09-29 PROCEDURE — 3331090001 HH PPS REVENUE CREDIT

## 2020-09-30 ENCOUNTER — HOME CARE VISIT (OUTPATIENT)
Dept: SCHEDULING | Facility: HOME HEALTH | Age: 70
End: 2020-09-30
Payer: MEDICARE

## 2020-09-30 VITALS
TEMPERATURE: 97.4 F | RESPIRATION RATE: 18 BRPM | SYSTOLIC BLOOD PRESSURE: 140 MMHG | HEART RATE: 84 BPM | DIASTOLIC BLOOD PRESSURE: 82 MMHG

## 2020-09-30 PROCEDURE — G0157 HHC PT ASSISTANT EA 15: HCPCS

## 2020-09-30 PROCEDURE — 3331090002 HH PPS REVENUE DEBIT

## 2020-09-30 PROCEDURE — 3331090001 HH PPS REVENUE CREDIT

## 2020-09-30 PROCEDURE — 400016 HH ROC

## 2020-10-01 PROCEDURE — 3331090002 HH PPS REVENUE DEBIT

## 2020-10-01 PROCEDURE — 3331090001 HH PPS REVENUE CREDIT

## 2020-10-02 ENCOUNTER — HOME CARE VISIT (OUTPATIENT)
Dept: SCHEDULING | Facility: HOME HEALTH | Age: 70
End: 2020-10-02
Payer: MEDICARE

## 2020-10-02 VITALS
SYSTOLIC BLOOD PRESSURE: 124 MMHG | HEART RATE: 80 BPM | TEMPERATURE: 97.4 F | RESPIRATION RATE: 18 BRPM | DIASTOLIC BLOOD PRESSURE: 76 MMHG

## 2020-10-02 PROCEDURE — 3331090001 HH PPS REVENUE CREDIT

## 2020-10-02 PROCEDURE — 3331090002 HH PPS REVENUE DEBIT

## 2020-10-02 PROCEDURE — G0157 HHC PT ASSISTANT EA 15: HCPCS

## 2020-10-03 PROCEDURE — 3331090002 HH PPS REVENUE DEBIT

## 2020-10-03 PROCEDURE — 3331090001 HH PPS REVENUE CREDIT

## 2020-10-04 PROCEDURE — 3331090002 HH PPS REVENUE DEBIT

## 2020-10-04 PROCEDURE — 3331090001 HH PPS REVENUE CREDIT

## 2020-10-05 PROCEDURE — 3331090002 HH PPS REVENUE DEBIT

## 2020-10-05 PROCEDURE — 3331090001 HH PPS REVENUE CREDIT

## 2020-10-06 PROCEDURE — 3331090001 HH PPS REVENUE CREDIT

## 2020-10-06 PROCEDURE — 3331090002 HH PPS REVENUE DEBIT

## 2020-10-07 ENCOUNTER — HOME CARE VISIT (OUTPATIENT)
Dept: SCHEDULING | Facility: HOME HEALTH | Age: 70
End: 2020-10-07
Payer: MEDICARE

## 2020-10-07 VITALS
RESPIRATION RATE: 18 BRPM | SYSTOLIC BLOOD PRESSURE: 120 MMHG | TEMPERATURE: 98.4 F | DIASTOLIC BLOOD PRESSURE: 76 MMHG | HEART RATE: 78 BPM

## 2020-10-07 PROCEDURE — 3331090002 HH PPS REVENUE DEBIT

## 2020-10-07 PROCEDURE — 3331090001 HH PPS REVENUE CREDIT

## 2020-10-07 PROCEDURE — G0157 HHC PT ASSISTANT EA 15: HCPCS

## 2020-10-08 PROCEDURE — 3331090001 HH PPS REVENUE CREDIT

## 2020-10-08 PROCEDURE — 3331090002 HH PPS REVENUE DEBIT

## 2020-10-09 ENCOUNTER — HOME CARE VISIT (OUTPATIENT)
Dept: SCHEDULING | Facility: HOME HEALTH | Age: 70
End: 2020-10-09
Payer: MEDICARE

## 2020-10-09 VITALS
SYSTOLIC BLOOD PRESSURE: 122 MMHG | TEMPERATURE: 98 F | RESPIRATION RATE: 18 BRPM | HEART RATE: 72 BPM | DIASTOLIC BLOOD PRESSURE: 78 MMHG

## 2020-10-09 PROCEDURE — G0157 HHC PT ASSISTANT EA 15: HCPCS

## 2020-10-09 PROCEDURE — 3331090001 HH PPS REVENUE CREDIT

## 2020-10-09 PROCEDURE — 3331090002 HH PPS REVENUE DEBIT

## 2020-10-10 PROCEDURE — 3331090001 HH PPS REVENUE CREDIT

## 2020-10-10 PROCEDURE — 3331090002 HH PPS REVENUE DEBIT

## 2020-10-11 PROCEDURE — 3331090001 HH PPS REVENUE CREDIT

## 2020-10-11 PROCEDURE — 3331090002 HH PPS REVENUE DEBIT

## 2020-10-12 PROCEDURE — 3331090001 HH PPS REVENUE CREDIT

## 2020-10-12 PROCEDURE — 3331090002 HH PPS REVENUE DEBIT

## 2020-10-13 ENCOUNTER — HOME CARE VISIT (OUTPATIENT)
Dept: SCHEDULING | Facility: HOME HEALTH | Age: 70
End: 2020-10-13
Payer: MEDICARE

## 2020-10-13 ENCOUNTER — HOME CARE VISIT (OUTPATIENT)
Dept: HOME HEALTH SERVICES | Facility: HOME HEALTH | Age: 70
End: 2020-10-13
Payer: MEDICARE

## 2020-10-13 VITALS
RESPIRATION RATE: 18 BRPM | SYSTOLIC BLOOD PRESSURE: 130 MMHG | HEART RATE: 80 BPM | DIASTOLIC BLOOD PRESSURE: 82 MMHG | TEMPERATURE: 98.2 F

## 2020-10-13 PROCEDURE — G0157 HHC PT ASSISTANT EA 15: HCPCS

## 2020-10-13 PROCEDURE — 3331090001 HH PPS REVENUE CREDIT

## 2020-10-13 PROCEDURE — 3331090002 HH PPS REVENUE DEBIT

## 2020-10-14 PROCEDURE — 3331090002 HH PPS REVENUE DEBIT

## 2020-10-14 PROCEDURE — 3331090001 HH PPS REVENUE CREDIT

## 2020-10-15 ENCOUNTER — HOME CARE VISIT (OUTPATIENT)
Dept: SCHEDULING | Facility: HOME HEALTH | Age: 70
End: 2020-10-15
Payer: MEDICARE

## 2020-10-15 PROCEDURE — G0151 HHCP-SERV OF PT,EA 15 MIN: HCPCS

## 2020-10-15 PROCEDURE — 3331090002 HH PPS REVENUE DEBIT

## 2020-10-15 PROCEDURE — 3331090001 HH PPS REVENUE CREDIT

## 2020-10-16 VITALS
TEMPERATURE: 98.8 F | DIASTOLIC BLOOD PRESSURE: 84 MMHG | RESPIRATION RATE: 18 BRPM | HEART RATE: 82 BPM | SYSTOLIC BLOOD PRESSURE: 138 MMHG

## 2020-10-16 PROCEDURE — 3331090002 HH PPS REVENUE DEBIT

## 2020-10-16 PROCEDURE — 3331090001 HH PPS REVENUE CREDIT

## 2020-10-17 PROCEDURE — 3331090001 HH PPS REVENUE CREDIT

## 2020-10-17 PROCEDURE — 3331090002 HH PPS REVENUE DEBIT

## 2020-10-18 PROCEDURE — 3331090001 HH PPS REVENUE CREDIT

## 2020-10-18 PROCEDURE — 3331090002 HH PPS REVENUE DEBIT

## 2020-10-19 PROCEDURE — 3331090002 HH PPS REVENUE DEBIT

## 2020-10-19 PROCEDURE — 3331090001 HH PPS REVENUE CREDIT

## 2020-10-20 PROCEDURE — 3331090001 HH PPS REVENUE CREDIT

## 2020-10-20 PROCEDURE — 3331090002 HH PPS REVENUE DEBIT

## 2020-10-21 PROCEDURE — 3331090001 HH PPS REVENUE CREDIT

## 2020-10-21 PROCEDURE — 3331090002 HH PPS REVENUE DEBIT

## 2020-10-22 PROCEDURE — 3331090002 HH PPS REVENUE DEBIT

## 2020-10-22 PROCEDURE — 3331090001 HH PPS REVENUE CREDIT

## 2021-01-06 ENCOUNTER — HOSPITAL ENCOUNTER (OUTPATIENT)
Dept: LAB | Age: 71
Discharge: HOME | End: 2021-01-06

## 2021-01-06 PROCEDURE — 88312 SPECIAL STAINS GROUP 1: CPT

## 2021-01-06 PROCEDURE — 88305 TISSUE EXAM BY PATHOLOGIST: CPT

## 2022-03-18 PROBLEM — M16.12 OSTEOARTHRITIS OF LEFT HIP: Status: ACTIVE | Noted: 2020-07-29

## 2022-03-19 PROBLEM — C18.9 COLON CANCER (HCC): Status: ACTIVE | Noted: 2019-07-31

## 2022-03-19 PROBLEM — Z96.649 STATUS POST REVISION OF TOTAL HIP: Status: ACTIVE | Noted: 2020-09-23

## 2022-03-20 PROBLEM — C18.2 MALIGNANT NEOPLASM OF ASCENDING COLON (HCC): Status: ACTIVE | Noted: 2019-07-04

## 2022-07-07 LAB — PROSTATE SPECIFIC ANTIGEN: 8.39 NG/ML

## 2023-04-26 LAB — PROSTATE SPECIFIC ANTIGEN: 9.92 NG/ML

## 2023-06-28 ENCOUNTER — OFFICE VISIT (OUTPATIENT)
Dept: ORTHOPEDIC SURGERY | Age: 73
End: 2023-06-28
Payer: MEDICARE

## 2023-06-28 DIAGNOSIS — M19.079 ANKLE ARTHRITIS: Primary | ICD-10-CM

## 2023-06-28 PROCEDURE — 1036F TOBACCO NON-USER: CPT | Performed by: ORTHOPAEDIC SURGERY

## 2023-06-28 PROCEDURE — G8417 CALC BMI ABV UP PARAM F/U: HCPCS | Performed by: ORTHOPAEDIC SURGERY

## 2023-06-28 PROCEDURE — 3017F COLORECTAL CA SCREEN DOC REV: CPT | Performed by: ORTHOPAEDIC SURGERY

## 2023-06-28 PROCEDURE — 1123F ACP DISCUSS/DSCN MKR DOCD: CPT | Performed by: ORTHOPAEDIC SURGERY

## 2023-06-28 PROCEDURE — 99214 OFFICE O/P EST MOD 30 MIN: CPT | Performed by: ORTHOPAEDIC SURGERY

## 2023-06-28 PROCEDURE — G8427 DOCREV CUR MEDS BY ELIG CLIN: HCPCS | Performed by: ORTHOPAEDIC SURGERY

## 2023-06-28 RX ORDER — DOCUSATE SODIUM 100 MG/1
1 CAPSULE, LIQUID FILLED ORAL PRN
COMMUNITY

## 2023-06-29 DIAGNOSIS — M19.072 PRIMARY OSTEOARTHRITIS, LEFT ANKLE AND FOOT: Primary | ICD-10-CM

## 2023-06-29 DIAGNOSIS — M19.079 ANKLE ARTHRITIS: ICD-10-CM

## 2023-06-30 ENCOUNTER — HOSPITAL ENCOUNTER (INPATIENT)
Age: 73
LOS: 2 days | Discharge: HOME | DRG: 394 | End: 2023-07-02
Attending: EMERGENCY MEDICINE | Admitting: INTERNAL MEDICINE
Payer: MEDICARE

## 2023-06-30 ENCOUNTER — APPOINTMENT (OUTPATIENT)
Dept: GENERAL RADIOLOGY | Age: 73
DRG: 394 | End: 2023-06-30
Payer: MEDICARE

## 2023-06-30 ENCOUNTER — HOSPITAL ENCOUNTER (EMERGENCY)
Age: 73
Discharge: HOME | DRG: 394 | End: 2023-06-30
Attending: STUDENT IN AN ORGANIZED HEALTH CARE EDUCATION/TRAINING PROGRAM | Admitting: STUDENT IN AN ORGANIZED HEALTH CARE EDUCATION/TRAINING PROGRAM
Payer: MEDICARE

## 2023-06-30 VITALS
OXYGEN SATURATION: 93 % | RESPIRATION RATE: 21 BRPM | TEMPERATURE: 98.1 F | HEIGHT: 68 IN | DIASTOLIC BLOOD PRESSURE: 95 MMHG | HEART RATE: 65 BPM | SYSTOLIC BLOOD PRESSURE: 180 MMHG | BODY MASS INDEX: 33.34 KG/M2 | WEIGHT: 220 LBS

## 2023-06-30 DIAGNOSIS — R11.0 NAUSEA: Primary | ICD-10-CM

## 2023-06-30 DIAGNOSIS — R13.10 DYSPHAGIA, UNSPECIFIED TYPE: ICD-10-CM

## 2023-06-30 DIAGNOSIS — R05.8 COUGH PRODUCTIVE OF CLEAR SPUTUM: ICD-10-CM

## 2023-06-30 DIAGNOSIS — R11.11 VOMITING WITHOUT NAUSEA, UNSPECIFIED VOMITING TYPE: Primary | ICD-10-CM

## 2023-06-30 DIAGNOSIS — G47.33 OBSTRUCTIVE SLEEP APNEA: ICD-10-CM

## 2023-06-30 PROBLEM — K21.9 GERD (GASTROESOPHAGEAL REFLUX DISEASE): Status: ACTIVE | Noted: 2023-06-30

## 2023-06-30 PROBLEM — F32.5 MDD (MAJOR DEPRESSIVE DISORDER), SINGLE EPISODE, IN FULL REMISSION (HCC): Status: ACTIVE | Noted: 2023-06-30

## 2023-06-30 LAB
ALBUMIN SERPL-MCNC: 4 G/DL (ref 3.2–4.6)
ALBUMIN/GLOB SERPL: 1.1 (ref 0.4–1.6)
ALP SERPL-CCNC: 96 U/L (ref 50–136)
ALT SERPL-CCNC: 35 U/L (ref 12–65)
ANION GAP SERPL CALC-SCNC: 6 MMOL/L (ref 2–11)
ANION GAP SERPL CALC-SCNC: 6 MMOL/L (ref 2–11)
AST SERPL-CCNC: 30 U/L (ref 15–37)
BASOPHILS # BLD: 0 K/UL (ref 0–0.2)
BASOPHILS # BLD: 0 K/UL (ref 0–0.2)
BASOPHILS NFR BLD: 0 % (ref 0–2)
BASOPHILS NFR BLD: 0 % (ref 0–2)
BILIRUB SERPL-MCNC: 0.8 MG/DL (ref 0.2–1.1)
BUN SERPL-MCNC: 16 MG/DL (ref 8–23)
BUN SERPL-MCNC: 21 MG/DL (ref 8–23)
CALCIUM SERPL-MCNC: 9 MG/DL (ref 8.3–10.4)
CALCIUM SERPL-MCNC: 9.5 MG/DL (ref 8.3–10.4)
CHLORIDE SERPL-SCNC: 110 MMOL/L (ref 101–110)
CHLORIDE SERPL-SCNC: 110 MMOL/L (ref 101–110)
CO2 SERPL-SCNC: 28 MMOL/L (ref 21–32)
CO2 SERPL-SCNC: 28 MMOL/L (ref 21–32)
CREAT SERPL-MCNC: 0.98 MG/DL (ref 0.8–1.5)
CREAT SERPL-MCNC: 1 MG/DL (ref 0.8–1.5)
DIFFERENTIAL METHOD BLD: ABNORMAL
DIFFERENTIAL METHOD BLD: ABNORMAL
EKG ATRIAL RATE: 60 BPM
EKG DIAGNOSIS: NORMAL
EKG P AXIS: 51 DEGREES
EKG P-R INTERVAL: 164 MS
EKG Q-T INTERVAL: 428 MS
EKG QRS DURATION: 103 MS
EKG QTC CALCULATION (BAZETT): 428 MS
EKG R AXIS: 18 DEGREES
EKG T AXIS: 54 DEGREES
EKG VENTRICULAR RATE: 60 BPM
EOSINOPHIL # BLD: 0 K/UL (ref 0–0.8)
EOSINOPHIL # BLD: 0.1 K/UL (ref 0–0.8)
EOSINOPHIL NFR BLD: 0 % (ref 0.5–7.8)
EOSINOPHIL NFR BLD: 1 % (ref 0.5–7.8)
ERYTHROCYTE [DISTWIDTH] IN BLOOD BY AUTOMATED COUNT: 14.8 % (ref 11.9–14.6)
ERYTHROCYTE [DISTWIDTH] IN BLOOD BY AUTOMATED COUNT: 15.2 % (ref 11.9–14.6)
GLOBULIN SER CALC-MCNC: 3.8 G/DL (ref 2.8–4.5)
GLUCOSE SERPL-MCNC: 132 MG/DL (ref 65–100)
GLUCOSE SERPL-MCNC: 150 MG/DL (ref 65–100)
HCT VFR BLD AUTO: 44.5 % (ref 41.1–50.3)
HCT VFR BLD AUTO: 46.9 % (ref 41.1–50.3)
HGB BLD-MCNC: 14.7 G/DL (ref 13.6–17.2)
HGB BLD-MCNC: 15.3 G/DL (ref 13.6–17.2)
IMM GRANULOCYTES # BLD AUTO: 0 K/UL (ref 0–0.5)
IMM GRANULOCYTES # BLD AUTO: 0 K/UL (ref 0–0.5)
IMM GRANULOCYTES NFR BLD AUTO: 0 % (ref 0–5)
IMM GRANULOCYTES NFR BLD AUTO: 0 % (ref 0–5)
LIPASE SERPL-CCNC: 116 U/L (ref 73–393)
LYMPHOCYTES # BLD: 1.2 K/UL (ref 0.5–4.6)
LYMPHOCYTES # BLD: 1.3 K/UL (ref 0.5–4.6)
LYMPHOCYTES NFR BLD: 13 % (ref 13–44)
LYMPHOCYTES NFR BLD: 19 % (ref 13–44)
MCH RBC QN AUTO: 28 PG (ref 26.1–32.9)
MCH RBC QN AUTO: 28.3 PG (ref 26.1–32.9)
MCHC RBC AUTO-ENTMCNC: 32.6 G/DL (ref 31.4–35)
MCHC RBC AUTO-ENTMCNC: 33 G/DL (ref 31.4–35)
MCV RBC AUTO: 84.8 FL (ref 82–102)
MCV RBC AUTO: 86.7 FL (ref 82–102)
MONOCYTES # BLD: 0.6 K/UL (ref 0.1–1.3)
MONOCYTES # BLD: 0.8 K/UL (ref 0.1–1.3)
MONOCYTES NFR BLD: 9 % (ref 4–12)
MONOCYTES NFR BLD: 9 % (ref 4–12)
NEUTS SEG # BLD: 4.7 K/UL (ref 1.7–8.2)
NEUTS SEG # BLD: 7 K/UL (ref 1.7–8.2)
NEUTS SEG NFR BLD: 71 % (ref 43–78)
NEUTS SEG NFR BLD: 78 % (ref 43–78)
NRBC # BLD: 0 K/UL (ref 0–0.2)
NRBC # BLD: 0 K/UL (ref 0–0.2)
PLATELET # BLD AUTO: 150 K/UL (ref 150–450)
PLATELET # BLD AUTO: 160 K/UL (ref 150–450)
PMV BLD AUTO: 7.8 FL (ref 9.4–12.3)
PMV BLD AUTO: 8.2 FL (ref 9.4–12.3)
POTASSIUM SERPL-SCNC: 3.7 MMOL/L (ref 3.5–5.1)
POTASSIUM SERPL-SCNC: 4 MMOL/L (ref 3.5–5.1)
PROSTATE SPECIFIC ANTIGEN: 10.3 NG/ML
PROT SERPL-MCNC: 7.8 G/DL (ref 6.3–8.2)
RBC # BLD AUTO: 5.25 M/UL (ref 4.23–5.6)
RBC # BLD AUTO: 5.41 M/UL (ref 4.23–5.6)
SODIUM SERPL-SCNC: 144 MMOL/L (ref 133–143)
SODIUM SERPL-SCNC: 144 MMOL/L (ref 133–143)
TROPONIN I SERPL HS-MCNC: 8.8 PG/ML (ref 0–14)
WBC # BLD AUTO: 6.6 K/UL (ref 4.3–11.1)
WBC # BLD AUTO: 9.1 K/UL (ref 4.3–11.1)

## 2023-06-30 PROCEDURE — 2580000003 HC RX 258

## 2023-06-30 PROCEDURE — 93005 ELECTROCARDIOGRAM TRACING: CPT | Performed by: STUDENT IN AN ORGANIZED HEALTH CARE EDUCATION/TRAINING PROGRAM

## 2023-06-30 PROCEDURE — 74018 RADEX ABDOMEN 1 VIEW: CPT

## 2023-06-30 PROCEDURE — 71046 X-RAY EXAM CHEST 2 VIEWS: CPT

## 2023-06-30 PROCEDURE — 6360000002 HC RX W HCPCS: Performed by: STUDENT IN AN ORGANIZED HEALTH CARE EDUCATION/TRAINING PROGRAM

## 2023-06-30 PROCEDURE — 80048 BASIC METABOLIC PNL TOTAL CA: CPT

## 2023-06-30 PROCEDURE — 96361 HYDRATE IV INFUSION ADD-ON: CPT

## 2023-06-30 PROCEDURE — 93010 ELECTROCARDIOGRAM REPORT: CPT | Performed by: INTERNAL MEDICINE

## 2023-06-30 PROCEDURE — 1100000000 HC RM PRIVATE

## 2023-06-30 PROCEDURE — 99285 EMERGENCY DEPT VISIT HI MDM: CPT

## 2023-06-30 PROCEDURE — 84484 ASSAY OF TROPONIN QUANT: CPT

## 2023-06-30 PROCEDURE — 96360 HYDRATION IV INFUSION INIT: CPT

## 2023-06-30 PROCEDURE — 85025 COMPLETE CBC W/AUTO DIFF WBC: CPT

## 2023-06-30 PROCEDURE — 80053 COMPREHEN METABOLIC PANEL: CPT

## 2023-06-30 PROCEDURE — 83690 ASSAY OF LIPASE: CPT

## 2023-06-30 RX ORDER — SODIUM CHLORIDE 9 MG/ML
INJECTION, SOLUTION INTRAVENOUS PRN
Status: DISCONTINUED | OUTPATIENT
Start: 2023-06-30 | End: 2023-07-02 | Stop reason: HOSPADM

## 2023-06-30 RX ORDER — POLYETHYLENE GLYCOL 3350 17 G/17G
17 POWDER, FOR SOLUTION ORAL DAILY PRN
Status: DISCONTINUED | OUTPATIENT
Start: 2023-06-30 | End: 2023-07-02 | Stop reason: HOSPADM

## 2023-06-30 RX ORDER — ONDANSETRON 4 MG/1
4 TABLET, ORALLY DISINTEGRATING ORAL EVERY 8 HOURS PRN
Status: DISCONTINUED | OUTPATIENT
Start: 2023-06-30 | End: 2023-07-02 | Stop reason: HOSPADM

## 2023-06-30 RX ORDER — ACETAMINOPHEN 650 MG/1
650 SUPPOSITORY RECTAL EVERY 6 HOURS PRN
Status: DISCONTINUED | OUTPATIENT
Start: 2023-06-30 | End: 2023-07-02 | Stop reason: HOSPADM

## 2023-06-30 RX ORDER — ACETAMINOPHEN 325 MG/1
650 TABLET ORAL EVERY 6 HOURS PRN
Status: DISCONTINUED | OUTPATIENT
Start: 2023-06-30 | End: 2023-07-02 | Stop reason: HOSPADM

## 2023-06-30 RX ORDER — SODIUM CHLORIDE, SODIUM LACTATE, POTASSIUM CHLORIDE, CALCIUM CHLORIDE 600; 310; 30; 20 MG/100ML; MG/100ML; MG/100ML; MG/100ML
INJECTION, SOLUTION INTRAVENOUS CONTINUOUS
Status: DISCONTINUED | OUTPATIENT
Start: 2023-06-30 | End: 2023-07-01

## 2023-06-30 RX ORDER — 0.9 % SODIUM CHLORIDE 0.9 %
1000 INTRAVENOUS SOLUTION INTRAVENOUS ONCE
Status: COMPLETED | OUTPATIENT
Start: 2023-06-30 | End: 2023-06-30

## 2023-06-30 RX ORDER — ONDANSETRON 4 MG/1
4 TABLET, FILM COATED ORAL 3 TIMES DAILY PRN
Qty: 15 TABLET | Refills: 0 | Status: SHIPPED | OUTPATIENT
Start: 2023-06-30

## 2023-06-30 RX ORDER — ONDANSETRON 2 MG/ML
4 INJECTION INTRAMUSCULAR; INTRAVENOUS EVERY 6 HOURS PRN
Status: DISCONTINUED | OUTPATIENT
Start: 2023-06-30 | End: 2023-07-02 | Stop reason: HOSPADM

## 2023-06-30 RX ORDER — ONDANSETRON 2 MG/ML
4 INJECTION INTRAMUSCULAR; INTRAVENOUS
Status: COMPLETED | OUTPATIENT
Start: 2023-06-30 | End: 2023-06-30

## 2023-06-30 RX ORDER — CETIRIZINE HYDROCHLORIDE 10 MG/1
10 TABLET ORAL DAILY
Qty: 30 TABLET | Refills: 0 | Status: SHIPPED | OUTPATIENT
Start: 2023-06-30 | End: 2023-07-30

## 2023-06-30 RX ORDER — SODIUM CHLORIDE 0.9 % (FLUSH) 0.9 %
5-40 SYRINGE (ML) INJECTION EVERY 12 HOURS SCHEDULED
Status: DISCONTINUED | OUTPATIENT
Start: 2023-06-30 | End: 2023-07-02 | Stop reason: HOSPADM

## 2023-06-30 RX ORDER — SODIUM CHLORIDE 0.9 % (FLUSH) 0.9 %
5-40 SYRINGE (ML) INJECTION PRN
Status: DISCONTINUED | OUTPATIENT
Start: 2023-06-30 | End: 2023-07-02 | Stop reason: HOSPADM

## 2023-06-30 RX ORDER — ENOXAPARIN SODIUM 100 MG/ML
40 INJECTION SUBCUTANEOUS DAILY
Status: DISCONTINUED | OUTPATIENT
Start: 2023-07-01 | End: 2023-07-02 | Stop reason: HOSPADM

## 2023-06-30 RX ADMIN — ONDANSETRON 4 MG: 2 INJECTION INTRAMUSCULAR; INTRAVENOUS at 02:51

## 2023-06-30 RX ADMIN — SODIUM CHLORIDE 1000 ML: 9 INJECTION, SOLUTION INTRAVENOUS at 21:41

## 2023-06-30 ASSESSMENT — PAIN - FUNCTIONAL ASSESSMENT
PAIN_FUNCTIONAL_ASSESSMENT: NONE - DENIES PAIN
PAIN_FUNCTIONAL_ASSESSMENT: NONE - DENIES PAIN

## 2023-06-30 ASSESSMENT — PAIN SCALES - GENERAL: PAINLEVEL_OUTOF10: 0

## 2023-07-01 ENCOUNTER — PREP FOR PROCEDURE (OUTPATIENT)
Dept: GASTROENTEROLOGY | Age: 73
End: 2023-07-01

## 2023-07-01 ENCOUNTER — ANESTHESIA EVENT (OUTPATIENT)
Dept: ENDOSCOPY | Age: 73
End: 2023-07-01
Payer: MEDICARE

## 2023-07-01 ENCOUNTER — ANESTHESIA (OUTPATIENT)
Dept: ENDOSCOPY | Age: 73
End: 2023-07-01
Payer: MEDICARE

## 2023-07-01 ENCOUNTER — APPOINTMENT (OUTPATIENT)
Dept: GENERAL RADIOLOGY | Age: 73
DRG: 394 | End: 2023-07-01
Payer: MEDICARE

## 2023-07-01 PROBLEM — T18.128A FOOD IMPACTION OF ESOPHAGUS: Status: ACTIVE | Noted: 2023-07-01
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
## 2023-07-01 LAB
ANION GAP SERPL CALC-SCNC: 5 MMOL/L (ref 2–11)
BASOPHILS # BLD: 0 K/UL (ref 0–0.2)
BASOPHILS NFR BLD: 0 % (ref 0–2)
BUN SERPL-MCNC: 18 MG/DL (ref 8–23)
CALCIUM SERPL-MCNC: 9 MG/DL (ref 8.3–10.4)
CHLORIDE SERPL-SCNC: 112 MMOL/L (ref 101–110)
CO2 SERPL-SCNC: 28 MMOL/L (ref 21–32)
CREAT SERPL-MCNC: 0.8 MG/DL (ref 0.8–1.5)
DIFFERENTIAL METHOD BLD: ABNORMAL
EOSINOPHIL # BLD: 0.1 K/UL (ref 0–0.8)
EOSINOPHIL NFR BLD: 1 % (ref 0.5–7.8)
ERYTHROCYTE [DISTWIDTH] IN BLOOD BY AUTOMATED COUNT: 15 % (ref 11.9–14.6)
GLUCOSE SERPL-MCNC: 126 MG/DL (ref 65–100)
HCT VFR BLD AUTO: 42.9 % (ref 41.1–50.3)
HGB BLD-MCNC: 13.8 G/DL (ref 13.6–17.2)
IMM GRANULOCYTES # BLD AUTO: 0 K/UL (ref 0–0.5)
IMM GRANULOCYTES NFR BLD AUTO: 0 % (ref 0–5)
LYMPHOCYTES # BLD: 1.2 K/UL (ref 0.5–4.6)
LYMPHOCYTES NFR BLD: 19 % (ref 13–44)
MCH RBC QN AUTO: 28.2 PG (ref 26.1–32.9)
MCHC RBC AUTO-ENTMCNC: 32.2 G/DL (ref 31.4–35)
MCV RBC AUTO: 87.6 FL (ref 82–102)
MONOCYTES # BLD: 0.6 K/UL (ref 0.1–1.3)
MONOCYTES NFR BLD: 9 % (ref 4–12)
NEUTS SEG # BLD: 4.5 K/UL (ref 1.7–8.2)
NEUTS SEG NFR BLD: 71 % (ref 43–78)
NRBC # BLD: 0 K/UL (ref 0–0.2)
PLATELET # BLD AUTO: 136 K/UL (ref 150–450)
PMV BLD AUTO: 8 FL (ref 9.4–12.3)
POTASSIUM SERPL-SCNC: 3.6 MMOL/L (ref 3.5–5.1)
RBC # BLD AUTO: 4.9 M/UL (ref 4.23–5.6)
SODIUM SERPL-SCNC: 145 MMOL/L (ref 133–143)
WBC # BLD AUTO: 6.4 K/UL (ref 4.3–11.1)

## 2023-07-01 PROCEDURE — 2580000003 HC RX 258: Performed by: INTERNAL MEDICINE

## 2023-07-01 PROCEDURE — 3609012900 HC EGD FOREIGN BODY REMOVAL: Performed by: STUDENT IN AN ORGANIZED HEALTH CARE EDUCATION/TRAINING PROGRAM

## 2023-07-01 PROCEDURE — 7100000000 HC PACU RECOVERY - FIRST 15 MIN: Performed by: STUDENT IN AN ORGANIZED HEALTH CARE EDUCATION/TRAINING PROGRAM

## 2023-07-01 PROCEDURE — 3700000001 HC ADD 15 MINUTES (ANESTHESIA): Performed by: STUDENT IN AN ORGANIZED HEALTH CARE EDUCATION/TRAINING PROGRAM

## 2023-07-01 PROCEDURE — 7100000001 HC PACU RECOVERY - ADDTL 15 MIN: Performed by: STUDENT IN AN ORGANIZED HEALTH CARE EDUCATION/TRAINING PROGRAM

## 2023-07-01 PROCEDURE — 36415 COLL VENOUS BLD VENIPUNCTURE: CPT

## 2023-07-01 PROCEDURE — C1726 CATH, BAL DIL, NON-VASCULAR: HCPCS | Performed by: STUDENT IN AN ORGANIZED HEALTH CARE EDUCATION/TRAINING PROGRAM

## 2023-07-01 PROCEDURE — 1100000000 HC RM PRIVATE

## 2023-07-01 PROCEDURE — 6360000002 HC RX W HCPCS: Performed by: NURSE ANESTHETIST, CERTIFIED REGISTERED

## 2023-07-01 PROCEDURE — 0DC38ZZ EXTIRPATION OF MATTER FROM LOWER ESOPHAGUS, VIA NATURAL OR ARTIFICIAL OPENING ENDOSCOPIC: ICD-10-PCS | Performed by: STUDENT IN AN ORGANIZED HEALTH CARE EDUCATION/TRAINING PROGRAM

## 2023-07-01 PROCEDURE — 6370000000 HC RX 637 (ALT 250 FOR IP): Performed by: NURSE ANESTHETIST, CERTIFIED REGISTERED

## 2023-07-01 PROCEDURE — 85025 COMPLETE CBC W/AUTO DIFF WBC: CPT

## 2023-07-01 PROCEDURE — 0DB58ZX EXCISION OF ESOPHAGUS, VIA NATURAL OR ARTIFICIAL OPENING ENDOSCOPIC, DIAGNOSTIC: ICD-10-PCS | Performed by: STUDENT IN AN ORGANIZED HEALTH CARE EDUCATION/TRAINING PROGRAM

## 2023-07-01 PROCEDURE — 0D738ZZ DILATION OF LOWER ESOPHAGUS, VIA NATURAL OR ARTIFICIAL OPENING ENDOSCOPIC: ICD-10-PCS | Performed by: STUDENT IN AN ORGANIZED HEALTH CARE EDUCATION/TRAINING PROGRAM

## 2023-07-01 PROCEDURE — 2709999900 HC NON-CHARGEABLE SUPPLY: Performed by: STUDENT IN AN ORGANIZED HEALTH CARE EDUCATION/TRAINING PROGRAM

## 2023-07-01 PROCEDURE — 2500000003 HC RX 250 WO HCPCS: Performed by: NURSE ANESTHETIST, CERTIFIED REGISTERED

## 2023-07-01 PROCEDURE — 3700000000 HC ANESTHESIA ATTENDED CARE: Performed by: STUDENT IN AN ORGANIZED HEALTH CARE EDUCATION/TRAINING PROGRAM

## 2023-07-01 PROCEDURE — 2700000000 HC OXYGEN THERAPY PER DAY

## 2023-07-01 PROCEDURE — 88305 TISSUE EXAM BY PATHOLOGIST: CPT

## 2023-07-01 PROCEDURE — 94760 N-INVAS EAR/PLS OXIMETRY 1: CPT

## 2023-07-01 PROCEDURE — 80048 BASIC METABOLIC PNL TOTAL CA: CPT

## 2023-07-01 PROCEDURE — 6370000000 HC RX 637 (ALT 250 FOR IP): Performed by: HOSPITALIST

## 2023-07-01 PROCEDURE — 71045 X-RAY EXAM CHEST 1 VIEW: CPT

## 2023-07-01 RX ORDER — PANTOPRAZOLE SODIUM 40 MG/1
40 TABLET, DELAYED RELEASE ORAL
Status: DISCONTINUED | OUTPATIENT
Start: 2023-07-01 | End: 2023-07-02 | Stop reason: HOSPADM

## 2023-07-01 RX ORDER — HYDRALAZINE HYDROCHLORIDE 20 MG/ML
10 INJECTION INTRAMUSCULAR; INTRAVENOUS EVERY 6 HOURS PRN
Status: DISCONTINUED | OUTPATIENT
Start: 2023-07-01 | End: 2023-07-02 | Stop reason: HOSPADM

## 2023-07-01 RX ORDER — EPHEDRINE SULFATE/0.9% NACL/PF 50 MG/5 ML
SYRINGE (ML) INTRAVENOUS PRN
Status: DISCONTINUED | OUTPATIENT
Start: 2023-07-01 | End: 2023-07-01 | Stop reason: SDUPTHER

## 2023-07-01 RX ORDER — ONDANSETRON 2 MG/ML
INJECTION INTRAMUSCULAR; INTRAVENOUS PRN
Status: DISCONTINUED | OUTPATIENT
Start: 2023-07-01 | End: 2023-07-01 | Stop reason: SDUPTHER

## 2023-07-01 RX ORDER — ALBUTEROL SULFATE 90 UG/1
AEROSOL, METERED RESPIRATORY (INHALATION) PRN
Status: DISCONTINUED | OUTPATIENT
Start: 2023-07-01 | End: 2023-07-01 | Stop reason: SDUPTHER

## 2023-07-01 RX ORDER — PROPOFOL 10 MG/ML
INJECTION, EMULSION INTRAVENOUS PRN
Status: DISCONTINUED | OUTPATIENT
Start: 2023-07-01 | End: 2023-07-01 | Stop reason: SDUPTHER

## 2023-07-01 RX ORDER — LIDOCAINE HYDROCHLORIDE 20 MG/ML
INJECTION, SOLUTION EPIDURAL; INFILTRATION; INTRACAUDAL; PERINEURAL PRN
Status: DISCONTINUED | OUTPATIENT
Start: 2023-07-01 | End: 2023-07-01 | Stop reason: SDUPTHER

## 2023-07-01 RX ORDER — ROCURONIUM BROMIDE 10 MG/ML
INJECTION, SOLUTION INTRAVENOUS PRN
Status: DISCONTINUED | OUTPATIENT
Start: 2023-07-01 | End: 2023-07-01 | Stop reason: SDUPTHER

## 2023-07-01 RX ORDER — SUCCINYLCHOLINE CHLORIDE 20 MG/ML
INJECTION INTRAMUSCULAR; INTRAVENOUS PRN
Status: DISCONTINUED | OUTPATIENT
Start: 2023-07-01 | End: 2023-07-01 | Stop reason: SDUPTHER

## 2023-07-01 RX ORDER — SODIUM CHLORIDE 0.9 % (FLUSH) 0.9 %
5-40 SYRINGE (ML) INJECTION EVERY 12 HOURS SCHEDULED
Status: CANCELLED | OUTPATIENT
Start: 2023-07-01

## 2023-07-01 RX ORDER — LABETALOL HYDROCHLORIDE 5 MG/ML
INJECTION, SOLUTION INTRAVENOUS PRN
Status: DISCONTINUED | OUTPATIENT
Start: 2023-07-01 | End: 2023-07-01 | Stop reason: SDUPTHER

## 2023-07-01 RX ORDER — DEXAMETHASONE SODIUM PHOSPHATE 4 MG/ML
INJECTION, SOLUTION INTRA-ARTICULAR; INTRALESIONAL; INTRAMUSCULAR; INTRAVENOUS; SOFT TISSUE PRN
Status: DISCONTINUED | OUTPATIENT
Start: 2023-07-01 | End: 2023-07-01 | Stop reason: SDUPTHER

## 2023-07-01 RX ORDER — SODIUM CHLORIDE 9 MG/ML
INJECTION, SOLUTION INTRAVENOUS PRN
Status: CANCELLED | OUTPATIENT
Start: 2023-07-01

## 2023-07-01 RX ORDER — LIDOCAINE HYDROCHLORIDE 10 MG/ML
1 INJECTION, SOLUTION INFILTRATION; PERINEURAL
Status: CANCELLED | OUTPATIENT
Start: 2023-07-01 | End: 2023-07-02

## 2023-07-01 RX ORDER — SODIUM CHLORIDE, SODIUM LACTATE, POTASSIUM CHLORIDE, CALCIUM CHLORIDE 600; 310; 30; 20 MG/100ML; MG/100ML; MG/100ML; MG/100ML
INJECTION, SOLUTION INTRAVENOUS CONTINUOUS
Status: CANCELLED | OUTPATIENT
Start: 2023-07-01

## 2023-07-01 RX ORDER — SODIUM CHLORIDE 0.9 % (FLUSH) 0.9 %
5-40 SYRINGE (ML) INJECTION PRN
Status: CANCELLED | OUTPATIENT
Start: 2023-07-01

## 2023-07-01 RX ADMIN — SUGAMMADEX 100 MG: 100 INJECTION, SOLUTION INTRAVENOUS at 12:38

## 2023-07-01 RX ADMIN — SODIUM CHLORIDE, PRESERVATIVE FREE 10 ML: 5 INJECTION INTRAVENOUS at 00:44

## 2023-07-01 RX ADMIN — PROPOFOL 200 MG: 10 INJECTION, EMULSION INTRAVENOUS at 12:00

## 2023-07-01 RX ADMIN — ALBUTEROL SULFATE 5 PUFF: 90 AEROSOL, METERED RESPIRATORY (INHALATION) at 12:29

## 2023-07-01 RX ADMIN — ONDANSETRON 4 MG: 2 INJECTION INTRAMUSCULAR; INTRAVENOUS at 11:56

## 2023-07-01 RX ADMIN — ROCURONIUM BROMIDE 10 MG: 50 INJECTION, SOLUTION INTRAVENOUS at 12:00

## 2023-07-01 RX ADMIN — Medication 160 MG: at 12:00

## 2023-07-01 RX ADMIN — LABETALOL HYDROCHLORIDE 10 MG: 5 INJECTION INTRAVENOUS at 12:40

## 2023-07-01 RX ADMIN — LABETALOL HYDROCHLORIDE 10 MG: 5 INJECTION INTRAVENOUS at 12:33

## 2023-07-01 RX ADMIN — LIDOCAINE HYDROCHLORIDE 100 MG: 20 INJECTION, SOLUTION EPIDURAL; INFILTRATION; INTRACAUDAL; PERINEURAL at 11:59

## 2023-07-01 RX ADMIN — DEXAMETHASONE SODIUM PHOSPHATE 10 MG: 4 INJECTION, SOLUTION INTRAMUSCULAR; INTRAVENOUS at 12:06

## 2023-07-01 RX ADMIN — SODIUM CHLORIDE, PRESERVATIVE FREE 10 ML: 5 INJECTION INTRAVENOUS at 19:31

## 2023-07-01 RX ADMIN — PANTOPRAZOLE SODIUM 40 MG: 40 TABLET, DELAYED RELEASE ORAL at 16:24

## 2023-07-01 RX ADMIN — Medication 20 MG: at 12:16

## 2023-07-01 RX ADMIN — SODIUM CHLORIDE, POTASSIUM CHLORIDE, SODIUM LACTATE AND CALCIUM CHLORIDE: 600; 310; 30; 20 INJECTION, SOLUTION INTRAVENOUS at 00:38

## 2023-07-01 ASSESSMENT — PAIN SCALES - GENERAL
PAINLEVEL_OUTOF10: 0
PAINLEVEL_OUTOF10: 0

## 2023-07-02 ENCOUNTER — TELEPHONE (OUTPATIENT)
Dept: INTERNAL MEDICINE | Age: 73
End: 2023-07-02

## 2023-07-02 VITALS
DIASTOLIC BLOOD PRESSURE: 80 MMHG | HEIGHT: 67 IN | RESPIRATION RATE: 20 BRPM | HEART RATE: 61 BPM | TEMPERATURE: 98.6 F | OXYGEN SATURATION: 91 % | SYSTOLIC BLOOD PRESSURE: 128 MMHG | BODY MASS INDEX: 30.17 KG/M2 | WEIGHT: 192.2 LBS

## 2023-07-02 PROCEDURE — 6360000002 HC RX W HCPCS: Performed by: HOSPITALIST

## 2023-07-02 PROCEDURE — 6370000000 HC RX 637 (ALT 250 FOR IP): Performed by: HOSPITALIST

## 2023-07-02 PROCEDURE — 6360000002 HC RX W HCPCS: Performed by: INTERNAL MEDICINE

## 2023-07-02 PROCEDURE — 6370000000 HC RX 637 (ALT 250 FOR IP): Performed by: INTERNAL MEDICINE

## 2023-07-02 PROCEDURE — 94761 N-INVAS EAR/PLS OXIMETRY MLT: CPT

## 2023-07-02 PROCEDURE — 2580000003 HC RX 258: Performed by: INTERNAL MEDICINE

## 2023-07-02 RX ORDER — FUROSEMIDE 10 MG/ML
20 INJECTION INTRAMUSCULAR; INTRAVENOUS ONCE
Status: DISCONTINUED | OUTPATIENT
Start: 2023-07-02 | End: 2023-07-02

## 2023-07-02 RX ORDER — FUROSEMIDE 10 MG/ML
40 INJECTION INTRAMUSCULAR; INTRAVENOUS ONCE
Status: COMPLETED | OUTPATIENT
Start: 2023-07-02 | End: 2023-07-02

## 2023-07-02 RX ORDER — OMEPRAZOLE 40 MG/1
40 CAPSULE, DELAYED RELEASE ORAL 2 TIMES DAILY WITH MEALS
Qty: 60 CAPSULE | Refills: 1 | Status: SHIPPED | OUTPATIENT
Start: 2023-07-02 | End: 2023-08-31

## 2023-07-02 RX ADMIN — FUROSEMIDE 40 MG: 10 INJECTION, SOLUTION INTRAMUSCULAR; INTRAVENOUS at 11:31

## 2023-07-02 RX ADMIN — SERTRALINE 200 MG: 50 TABLET, FILM COATED ORAL at 08:18

## 2023-07-02 RX ADMIN — SODIUM CHLORIDE, PRESERVATIVE FREE 10 ML: 5 INJECTION INTRAVENOUS at 08:20

## 2023-07-02 RX ADMIN — PANTOPRAZOLE SODIUM 40 MG: 40 TABLET, DELAYED RELEASE ORAL at 05:38

## 2023-07-02 RX ADMIN — ENOXAPARIN SODIUM 40 MG: 40 INJECTION SUBCUTANEOUS at 08:17

## 2023-07-03 ENCOUNTER — TELEPHONE (OUTPATIENT)
Dept: ORTHOPEDIC SURGERY | Age: 73
End: 2023-07-03

## 2023-07-05 DIAGNOSIS — M19.072 PRIMARY OSTEOARTHRITIS, LEFT ANKLE AND FOOT: ICD-10-CM

## 2023-07-05 DIAGNOSIS — M19.079 ANKLE ARTHRITIS: ICD-10-CM

## 2023-07-05 NOTE — TELEPHONE ENCOUNTER
Will check with Rep to see when pt can have surgery about 4-6 weeks out.  Will call pt back to discuss surgery dates

## 2023-07-06 DIAGNOSIS — M19.079 ANKLE ARTHRITIS: Primary | ICD-10-CM

## 2023-07-07 ENCOUNTER — TELEPHONE (OUTPATIENT)
Dept: ORTHOPEDIC SURGERY | Age: 73
End: 2023-07-07

## 2023-07-07 NOTE — TELEPHONE ENCOUNTER
Spoke to patient's wife who had questions regarding the patient's TAA surgery. All questions were answered and patient will call if there are any other questions.

## 2023-07-07 NOTE — TELEPHONE ENCOUNTER
She is asking for a return call from [facility]. Please call her at [phone]. She has some questions for you.

## 2023-07-13 NOTE — PROGRESS NOTES
Physician Progress Note      PATIENT:               Demetrio Cranker  CSN #:                  [id number]  :                       1950  ADMIT DATE:       2023 9:10 PM  DISCH DATE:        2023 12:51 PM  RESPONDING  PROVIDER #:        Isabella Sage MD          QUERY TEXT:    Pt admitted with dysphagia and discharge summary notes, \"Postprocedure,   patient had worsening shortness of breath. He was transferred to the ICU   initially on CPAP subsequently switched to nasal cannula. \" If possible, please   document in progress notes and discharge summary further specificity   regarding the type and acuity of respiratory failure: The medical record reflects the following:  Risk Factors: EGD this admission, age  Clinical Indicators: Patient underwent EGD and noted in discharge summary,   \"Postprocedure, patient had worsening shortness of breath. He was transferred   to the ICU initially on CPAP subsequently switched to nasal cannula. \" RR   noted to be up to 33, O2 sat of 88, placed on CPAP and given IV lasix. Treatment: ICU, CPAP, IV lasix  Options provided:  -- Acute respiratory failure with hypoxia  -- Acute respiratory failure with hypercapnia  -- Other - I will add my own diagnosis  -- Disagree - Not applicable / Not valid  -- Disagree - Clinically unable to determine / Unknown  -- Refer to Clinical Documentation Reviewer    PROVIDER RESPONSE TEXT:    This patient is in acute respiratory failure with hypoxia. Query created by:  Marsha Grijalva on 2023 1:23 PM      Electronically signed by:  Isabella Sage MD 2023 11:16 AM

## 2023-07-20 ENCOUNTER — OFFICE VISIT (OUTPATIENT)
Dept: ORTHOPEDIC SURGERY | Age: 73
End: 2023-07-20

## 2023-07-20 DIAGNOSIS — M19.072 PRIMARY OSTEOARTHRITIS, LEFT ANKLE AND FOOT: Primary | ICD-10-CM

## 2023-07-20 NOTE — PROGRESS NOTES
Large wraptors exchanged last week from [employer]L didn't work for the patient. Exchanged to an extra large to wear those.

## 2023-08-01 NOTE — PERIOP NOTE
Patient verified name and . Order for consent not found in EHR and matches case posting; patient verifies procedure. Type 1B surgery, phone assessment complete. Orders not received. Labs per surgeon: none  Labs per anesthesia protocol: none indicated    Patient with recent hospitalization ( 23-23) admitted with dysphagia. EGD (23) showed food impaction with esophageal stricture. Food removed via [employer] net. Post procedure, Dr. Vanessa Fraction charted:  Pt obstructs easily when he falls asleep and develops a respiratory pattern where he is using accessory muscles and his abdomen rocks back and forth. His O2 sat doesn't drop and he is moving some air but he is also on FM. When I arouse him it completely stops. Sumanth Hali He clearly has ZAINAB. I have asked for a RT consult to place him on CPAP. He will need to go to the ICU for the initiation of this. Patient went to ICU CXR showed pulmonary edema. Patient received IV lasix, Home oxygen study showed patient did not need home oxygen. Patient was Dc'd home with referral for sleep study. Study not been done as of yet. Chart sent done for Anesthesia review for any additional orders. Chart flagged for CN f/u. Patient answered medical/surgical history questions at their best of ability. All prior to admission medications documented in EPIC. Patient instructed to take the following medications the day of surgery according to anesthesia guidelines with a small sip of water: Omeprazole (Prilosec),  Zofran (ondansetron) if needed  On the day before surgery please take Acetaminophen 1000mg in the morning and then again before bed. You may substitute for Tylenol 650 mg. Hold all vitamins 7 days prior to surgery and NSAIDS 5 days prior to surgery.  Prescription meds to hold:none  Patient instructed on the following:    > Arrive at [address], time of arrival to be called the day before by 1700  > NPO after midnight, unless otherwise indicated, including gum,

## 2023-08-03 ENCOUNTER — TELEPHONE (OUTPATIENT)
Dept: ORTHOPEDIC SURGERY | Age: 73
End: 2023-08-03

## 2023-08-04 ENCOUNTER — OFFICE VISIT (OUTPATIENT)
Dept: UROLOGY | Age: 73
End: 2023-08-04
Payer: MEDICARE

## 2023-08-04 DIAGNOSIS — R97.20 ELEVATED PSA: Primary | ICD-10-CM

## 2023-08-04 LAB
BILIRUBIN, URINE, POC: NEGATIVE
BLOOD URINE, POC: NORMAL
GLUCOSE URINE, POC: NEGATIVE
KETONES, URINE, POC: NEGATIVE
LEUKOCYTE ESTERASE, URINE, POC: NEGATIVE
NITRITE, URINE, POC: NEGATIVE
PH, URINE, POC: 5.5 (ref 4.6–8)
PROTEIN,URINE, POC: 100
SPECIFIC GRAVITY, URINE, POC: 1.02 (ref 1–1.03)
URINALYSIS CLARITY, POC: NORMAL
URINALYSIS COLOR, POC: NORMAL
UROBILINOGEN, POC: NORMAL

## 2023-08-04 PROCEDURE — G8417 CALC BMI ABV UP PARAM F/U: HCPCS | Performed by: UROLOGY

## 2023-08-04 PROCEDURE — 1123F ACP DISCUSS/DSCN MKR DOCD: CPT | Performed by: UROLOGY

## 2023-08-04 PROCEDURE — G8427 DOCREV CUR MEDS BY ELIG CLIN: HCPCS | Performed by: UROLOGY

## 2023-08-04 PROCEDURE — 99204 OFFICE O/P NEW MOD 45 MIN: CPT | Performed by: UROLOGY

## 2023-08-04 PROCEDURE — 1036F TOBACCO NON-USER: CPT | Performed by: UROLOGY

## 2023-08-04 PROCEDURE — 81003 URINALYSIS AUTO W/O SCOPE: CPT | Performed by: UROLOGY

## 2023-08-04 PROCEDURE — 3017F COLORECTAL CA SCREEN DOC REV: CPT | Performed by: UROLOGY

## 2023-08-04 ASSESSMENT — ENCOUNTER SYMPTOMS
DIARRHEA: 1
RESPIRATORY NEGATIVE: 1
EYES NEGATIVE: 1

## 2023-08-04 NOTE — PROGRESS NOTES
[facility]  [address] [address]  [phone]    Dina Whittington  : 1950      Chief Complaint   Patient presents with    New Patient        HPI   75 y.o., male who is referred by Dr. Rhoderick Halsted for evaluation of an elevated PSA. PSA was 8.39 on 22; 9.92 on 23 and is now 10.3 on 23. No prior bx or FH of CaP. Reports stable nocturia 1-2x per night. Scheduled for ankle replacement next wk with Dr. Julio Burns.       Past Medical History:   Diagnosis Date    Anemia     per patient reports hx of 30 yrs ago    Anxiety     Arthritis     OA    Chronic pain     headaches    Cigar smoker     history     COVID-2020    not hospitalized    Dysphagia     GERD (gastroesophageal reflux disease)     controlled with medication    History of colon cancer     Major depressive disorder     managed with medication    Malignant neoplasm of ascending colon (HCC)     PUD (peptic ulcer disease) 1979    Rectal hemorrhage     Sinus tachycardia     Weakness of right upper extremity      Past Surgical History:   Procedure Laterality Date    CATARACT REMOVAL Bilateral 2017    CERVICAL FUSION      COLONOSCOPY      HEENT  1988    acoustic neuroma    LUMBAR FUSION  1992    TOTAL COLECTOMY Right 2019    TOTAL HIP ARTHROPLASTY Left 2020    UPPER GASTROINTESTINAL ENDOSCOPY N/A 2023    EGD FOREIGN BODY REMOVAL/ BALLOON DILATION/ BIOPSIES performed by Tracie Ly MD at [facility] ENDOSCOPY     Current Outpatient Medications   Medication Sig Dispense Refill    omeprazole (PRILOSEC) 40 MG delayed release capsule Take 1 capsule by mouth with breakfast and with evening meal TAKE 1 CAPSULE BY MOUTH ONCE DAILY 60 capsule 1    ondansetron (ZOFRAN) 4 MG tablet Take 1 tablet by mouth 3 times daily as needed for Nausea or Vomiting 15 tablet 0    docusate sodium (COLACE) 100 MG capsule Take 1 tablet by mouth as needed      Multiple Vitamins-Minerals (MULTIVITAMIN ADULTS PO) Take 1 tablet by mouth

## 2023-08-07 NOTE — PROGRESS NOTES
Preop department called to notify patient of arrival time for scheduled procedure. Instructions given to   - Arrive at [address] [facility]. - Remain NPO after midnight, unless otherwise indicated, including gum, mints, and ice chips. - Have a responsible adult to drive patient to the hospital, stay during surgery, and patient will need supervision 24 hours after anesthesia. - Use antibacterial soap in shower the night before surgery and on the morning of surgery.        Was patient contacted: yes  Voicemail left: yes  Numbers contacted: [phone]   Arrival time: 0600

## 2023-08-08 ENCOUNTER — ANESTHESIA EVENT (OUTPATIENT)
Dept: SURGERY | Age: 73
End: 2023-08-08
Payer: MEDICARE

## 2023-08-08 ENCOUNTER — APPOINTMENT (OUTPATIENT)
Dept: GENERAL RADIOLOGY | Age: 73
End: 2023-08-08
Attending: ORTHOPAEDIC SURGERY
Payer: MEDICARE

## 2023-08-08 ENCOUNTER — ANESTHESIA (OUTPATIENT)
Dept: SURGERY | Age: 73
End: 2023-08-08
Payer: MEDICARE

## 2023-08-08 ENCOUNTER — HOSPITAL ENCOUNTER (OUTPATIENT)
Age: 73
Setting detail: OUTPATIENT SURGERY
Discharge: HOME | End: 2023-08-08
Attending: ORTHOPAEDIC SURGERY | Admitting: ORTHOPAEDIC SURGERY
Payer: MEDICARE

## 2023-08-08 VITALS
TEMPERATURE: 97.8 F | RESPIRATION RATE: 18 BRPM | BODY MASS INDEX: 30.34 KG/M2 | DIASTOLIC BLOOD PRESSURE: 70 MMHG | HEART RATE: 55 BPM | HEIGHT: 68 IN | SYSTOLIC BLOOD PRESSURE: 155 MMHG | WEIGHT: 200.2 LBS | OXYGEN SATURATION: 93 %

## 2023-08-08 DIAGNOSIS — M19.079 ANKLE ARTHRITIS: ICD-10-CM

## 2023-08-08 DIAGNOSIS — G89.18 ACUTE POST-OPERATIVE PAIN: Primary | ICD-10-CM

## 2023-08-08 PROCEDURE — 27687 REVISION OF CALF TENDON: CPT | Performed by: ORTHOPAEDIC SURGERY

## 2023-08-08 PROCEDURE — 2720000010 HC SURG SUPPLY STERILE: Performed by: ORTHOPAEDIC SURGERY

## 2023-08-08 PROCEDURE — 7100000011 HC PHASE II RECOVERY - ADDTL 15 MIN: Performed by: ORTHOPAEDIC SURGERY

## 2023-08-08 PROCEDURE — 7100000010 HC PHASE II RECOVERY - FIRST 15 MIN: Performed by: ORTHOPAEDIC SURGERY

## 2023-08-08 PROCEDURE — 64445 NJX AA&/STRD SCIATIC NRV IMG: CPT | Performed by: ANESTHESIOLOGY

## 2023-08-08 PROCEDURE — 7100000000 HC PACU RECOVERY - FIRST 15 MIN: Performed by: ORTHOPAEDIC SURGERY

## 2023-08-08 PROCEDURE — 64447 NJX AA&/STRD FEMORAL NRV IMG: CPT | Performed by: ANESTHESIOLOGY

## 2023-08-08 PROCEDURE — 3600000014 HC SURGERY LEVEL 4 ADDTL 15MIN: Performed by: ORTHOPAEDIC SURGERY

## 2023-08-08 PROCEDURE — 3700000001 HC ADD 15 MINUTES (ANESTHESIA): Performed by: ORTHOPAEDIC SURGERY

## 2023-08-08 PROCEDURE — 27745 REINFORCE TIBIA: CPT | Performed by: ORTHOPAEDIC SURGERY

## 2023-08-08 PROCEDURE — 3600000004 HC SURGERY LEVEL 4 BASE: Performed by: ORTHOPAEDIC SURGERY

## 2023-08-08 PROCEDURE — 2500000003 HC RX 250 WO HCPCS: Performed by: NURSE ANESTHETIST, CERTIFIED REGISTERED

## 2023-08-08 PROCEDURE — 2500000003 HC RX 250 WO HCPCS: Performed by: ORTHOPAEDIC SURGERY

## 2023-08-08 PROCEDURE — C1776 JOINT DEVICE (IMPLANTABLE): HCPCS | Performed by: ORTHOPAEDIC SURGERY

## 2023-08-08 PROCEDURE — 7100000001 HC PACU RECOVERY - ADDTL 15 MIN: Performed by: ORTHOPAEDIC SURGERY

## 2023-08-08 PROCEDURE — 6360000002 HC RX W HCPCS: Performed by: NURSE PRACTITIONER

## 2023-08-08 PROCEDURE — 6360000002 HC RX W HCPCS: Performed by: ANESTHESIOLOGY

## 2023-08-08 PROCEDURE — 2580000003 HC RX 258: Performed by: NURSE PRACTITIONER

## 2023-08-08 PROCEDURE — 27702 RECONSTRUCT ANKLE JOINT: CPT | Performed by: ORTHOPAEDIC SURGERY

## 2023-08-08 PROCEDURE — 6370000000 HC RX 637 (ALT 250 FOR IP): Performed by: ANESTHESIOLOGY

## 2023-08-08 PROCEDURE — C1713 ANCHOR/SCREW BN/BN,TIS/BN: HCPCS | Performed by: ORTHOPAEDIC SURGERY

## 2023-08-08 PROCEDURE — 3700000000 HC ANESTHESIA ATTENDED CARE: Performed by: ORTHOPAEDIC SURGERY

## 2023-08-08 PROCEDURE — 6360000002 HC RX W HCPCS: Performed by: NURSE ANESTHETIST, CERTIFIED REGISTERED

## 2023-08-08 PROCEDURE — 28261 REVISION OF FOOT TENDON: CPT | Performed by: ORTHOPAEDIC SURGERY

## 2023-08-08 PROCEDURE — 2709999900 HC NON-CHARGEABLE SUPPLY: Performed by: ORTHOPAEDIC SURGERY

## 2023-08-08 DEVICE — IMPLANTABLE DEVICE
Type: IMPLANTABLE DEVICE | Site: ANKLE | Status: FUNCTIONAL
Brand: INBONE™ EVERLAST™

## 2023-08-08 DEVICE — STEINMANN PIN
Type: IMPLANTABLE DEVICE | Site: ANKLE | Status: FUNCTIONAL
Brand: INBONE

## 2023-08-08 DEVICE — IMPLANTABLE DEVICE
Type: IMPLANTABLE DEVICE | Site: ANKLE | Status: FUNCTIONAL
Brand: INBONE

## 2023-08-08 DEVICE — HEADED COMPRESSION SCREW
Type: IMPLANTABLE DEVICE | Site: ANKLE | Status: FUNCTIONAL
Brand: DART-FIRE

## 2023-08-08 DEVICE — IMPLANTABLE DEVICE
Type: IMPLANTABLE DEVICE | Site: ANKLE | Status: FUNCTIONAL
Brand: INFINITY™ BIOFOAM™ 3D

## 2023-08-08 DEVICE — K-WIRE
Type: IMPLANTABLE DEVICE | Site: ANKLE | Status: FUNCTIONAL
Brand: INBONE

## 2023-08-08 DEVICE — INSTRUMENT PACK
Type: IMPLANTABLE DEVICE | Site: ANKLE | Status: FUNCTIONAL
Brand: DARTFIRE EDGE

## 2023-08-08 RX ORDER — MIDAZOLAM HYDROCHLORIDE 2 MG/2ML
2 INJECTION, SOLUTION INTRAMUSCULAR; INTRAVENOUS
Status: COMPLETED | OUTPATIENT
Start: 2023-08-08 | End: 2023-08-08

## 2023-08-08 RX ORDER — SODIUM CHLORIDE 9 MG/ML
INJECTION, SOLUTION INTRAVENOUS PRN
Status: DISCONTINUED | OUTPATIENT
Start: 2023-08-08 | End: 2023-08-08 | Stop reason: HOSPADM

## 2023-08-08 RX ORDER — SODIUM CHLORIDE 0.9 % (FLUSH) 0.9 %
5-40 SYRINGE (ML) INJECTION EVERY 12 HOURS SCHEDULED
Status: DISCONTINUED | OUTPATIENT
Start: 2023-08-08 | End: 2023-08-08 | Stop reason: HOSPADM

## 2023-08-08 RX ORDER — OXYCODONE HYDROCHLORIDE 5 MG/1
5 TABLET ORAL
Status: DISCONTINUED | OUTPATIENT
Start: 2023-08-08 | End: 2023-08-09 | Stop reason: HOSPADM

## 2023-08-08 RX ORDER — CEPHALEXIN 500 MG/1
500 CAPSULE ORAL 4 TIMES DAILY
Qty: 12 CAPSULE | Refills: 0 | Status: SHIPPED | OUTPATIENT
Start: 2023-08-08

## 2023-08-08 RX ORDER — SODIUM CHLORIDE, SODIUM LACTATE, POTASSIUM CHLORIDE, CALCIUM CHLORIDE 600; 310; 30; 20 MG/100ML; MG/100ML; MG/100ML; MG/100ML
INJECTION, SOLUTION INTRAVENOUS CONTINUOUS
Status: DISCONTINUED | OUTPATIENT
Start: 2023-08-08 | End: 2023-08-08 | Stop reason: SDUPTHER

## 2023-08-08 RX ORDER — ACETAMINOPHEN 500 MG
1000 TABLET ORAL ONCE
Status: COMPLETED | OUTPATIENT
Start: 2023-08-08 | End: 2023-08-08

## 2023-08-08 RX ORDER — PROCHLORPERAZINE EDISYLATE 5 MG/ML
5 INJECTION INTRAMUSCULAR; INTRAVENOUS
Status: DISCONTINUED | OUTPATIENT
Start: 2023-08-08 | End: 2023-08-09 | Stop reason: HOSPADM

## 2023-08-08 RX ORDER — IPRATROPIUM BROMIDE AND ALBUTEROL SULFATE 2.5; .5 MG/3ML; MG/3ML
1 SOLUTION RESPIRATORY (INHALATION)
Status: DISCONTINUED | OUTPATIENT
Start: 2023-08-08 | End: 2023-08-09 | Stop reason: HOSPADM

## 2023-08-08 RX ORDER — HALOPERIDOL 5 MG/ML
1 INJECTION INTRAMUSCULAR
Status: DISCONTINUED | OUTPATIENT
Start: 2023-08-08 | End: 2023-08-09 | Stop reason: HOSPADM

## 2023-08-08 RX ORDER — DEXAMETHASONE SODIUM PHOSPHATE 10 MG/ML
INJECTION, SOLUTION INTRAMUSCULAR; INTRAVENOUS
Status: COMPLETED | OUTPATIENT
Start: 2023-08-08 | End: 2023-08-08

## 2023-08-08 RX ORDER — PROPOFOL 10 MG/ML
INJECTION, EMULSION INTRAVENOUS PRN
Status: DISCONTINUED | OUTPATIENT
Start: 2023-08-08 | End: 2023-08-08 | Stop reason: SDUPTHER

## 2023-08-08 RX ORDER — SODIUM CHLORIDE, SODIUM LACTATE, POTASSIUM CHLORIDE, CALCIUM CHLORIDE 600; 310; 30; 20 MG/100ML; MG/100ML; MG/100ML; MG/100ML
INJECTION, SOLUTION INTRAVENOUS CONTINUOUS
Status: DISCONTINUED | OUTPATIENT
Start: 2023-08-08 | End: 2023-08-08 | Stop reason: HOSPADM

## 2023-08-08 RX ORDER — LIDOCAINE HYDROCHLORIDE 20 MG/ML
INJECTION, SOLUTION EPIDURAL; INFILTRATION; INTRACAUDAL; PERINEURAL PRN
Status: DISCONTINUED | OUTPATIENT
Start: 2023-08-08 | End: 2023-08-08 | Stop reason: SDUPTHER

## 2023-08-08 RX ORDER — FENTANYL CITRATE 50 UG/ML
100 INJECTION, SOLUTION INTRAMUSCULAR; INTRAVENOUS
Status: COMPLETED | OUTPATIENT
Start: 2023-08-08 | End: 2023-08-08

## 2023-08-08 RX ORDER — OXYCODONE HYDROCHLORIDE 5 MG/1
5 TABLET ORAL EVERY 6 HOURS PRN
Qty: 20 TABLET | Refills: 0 | Status: SHIPPED | OUTPATIENT
Start: 2023-08-08 | End: 2023-08-11 | Stop reason: ALTCHOICE

## 2023-08-08 RX ORDER — ASPIRIN 81 MG/1
81 TABLET ORAL 2 TIMES DAILY
Qty: 84 TABLET | Refills: 0 | Status: SHIPPED | OUTPATIENT
Start: 2023-08-08

## 2023-08-08 RX ORDER — TRANEXAMIC ACID 100 MG/ML
INJECTION, SOLUTION INTRAVENOUS PRN
Status: DISCONTINUED | OUTPATIENT
Start: 2023-08-08 | End: 2023-08-08 | Stop reason: ALTCHOICE

## 2023-08-08 RX ORDER — SODIUM CHLORIDE 0.9 % (FLUSH) 0.9 %
5-40 SYRINGE (ML) INJECTION PRN
Status: DISCONTINUED | OUTPATIENT
Start: 2023-08-08 | End: 2023-08-08 | Stop reason: HOSPADM

## 2023-08-08 RX ORDER — LIDOCAINE HYDROCHLORIDE 10 MG/ML
1 INJECTION, SOLUTION INFILTRATION; PERINEURAL
Status: DISCONTINUED | OUTPATIENT
Start: 2023-08-08 | End: 2023-08-08 | Stop reason: HOSPADM

## 2023-08-08 RX ORDER — HYDROMORPHONE HYDROCHLORIDE 2 MG/ML
0.5 INJECTION, SOLUTION INTRAMUSCULAR; INTRAVENOUS; SUBCUTANEOUS EVERY 5 MIN PRN
Status: DISCONTINUED | OUTPATIENT
Start: 2023-08-08 | End: 2023-08-09 | Stop reason: HOSPADM

## 2023-08-08 RX ADMIN — DEXAMETHASONE SODIUM PHOSPHATE 4 MG: 10 INJECTION, SOLUTION INTRAMUSCULAR; INTRAVENOUS at 06:48

## 2023-08-08 RX ADMIN — PROPOFOL 100 MCG/KG/MIN: 10 INJECTION, EMULSION INTRAVENOUS at 07:15

## 2023-08-08 RX ADMIN — ROPIVACAINE HYDROCHLORIDE 20 ML: 5 INJECTION, SOLUTION EPIDURAL; INFILTRATION; PERINEURAL at 06:48

## 2023-08-08 RX ADMIN — FENTANYL CITRATE 50 MCG: 50 INJECTION, SOLUTION INTRAMUSCULAR; INTRAVENOUS at 06:48

## 2023-08-08 RX ADMIN — MIDAZOLAM HYDROCHLORIDE 1 MG: 1 INJECTION, SOLUTION INTRAMUSCULAR; INTRAVENOUS at 06:48

## 2023-08-08 RX ADMIN — DEXAMETHASONE SODIUM PHOSPHATE 2 MG: 10 INJECTION, SOLUTION INTRAMUSCULAR; INTRAVENOUS at 06:52

## 2023-08-08 RX ADMIN — CEFAZOLIN SODIUM 2000 MG: 100 INJECTION, POWDER, LYOPHILIZED, FOR SOLUTION INTRAVENOUS at 06:37

## 2023-08-08 RX ADMIN — ROPIVACAINE HYDROCHLORIDE 10 ML: 5 INJECTION, SOLUTION EPIDURAL; INFILTRATION; PERINEURAL at 06:52

## 2023-08-08 RX ADMIN — Medication 2 G: at 07:15

## 2023-08-08 RX ADMIN — PROPOFOL 20 MG: 10 INJECTION, EMULSION INTRAVENOUS at 07:14

## 2023-08-08 RX ADMIN — EPINEPHRINE 5 ML: 1 INJECTION, SOLUTION, CONCENTRATE INTRAVENOUS at 06:52

## 2023-08-08 RX ADMIN — LIDOCAINE HYDROCHLORIDE 20 MG: 20 INJECTION, SOLUTION EPIDURAL; INFILTRATION; INTRACAUDAL; PERINEURAL at 07:14

## 2023-08-08 RX ADMIN — SODIUM CHLORIDE, POTASSIUM CHLORIDE, SODIUM LACTATE AND CALCIUM CHLORIDE: 600; 310; 30; 20 INJECTION, SOLUTION INTRAVENOUS at 06:41

## 2023-08-08 RX ADMIN — ACETAMINOPHEN 1000 MG: 500 TABLET, FILM COATED ORAL at 06:37

## 2023-08-08 RX ADMIN — EPINEPHRINE 10 ML: 1 INJECTION, SOLUTION, CONCENTRATE INTRAVENOUS at 06:48

## 2023-08-08 ASSESSMENT — PAIN - FUNCTIONAL ASSESSMENT: PAIN_FUNCTIONAL_ASSESSMENT: 0-10

## 2023-08-08 NOTE — ANESTHESIA PROCEDURE NOTES
Peripheral Block    Patient location during procedure: pre-op  Reason for block: post-op pain management and at surgeon's request  Start time: 8/8/2023 6:48 AM  End time: 8/8/2023 6:51 AM  Staffing  Performed: anesthesiologist   Anesthesiologist: Bailey Lopez MD  Preanesthetic Checklist  Completed: patient identified, IV checked, site marked, risks and benefits discussed, surgical/procedural consents, equipment checked, pre-op evaluation, timeout performed, anesthesia consent given, oxygen available and monitors applied/VS acknowledged  Peripheral Block   Patient position: right lateral decubitus  Prep: ChloraPrep  Provider prep: mask  Patient monitoring: cardiac monitor, continuous pulse ox, frequent blood pressure checks, IV access and oxygen  Block type: Sciatic  Popliteal  Laterality: left  Injection technique: single-shot  Guidance: ultrasound guided    Needle   Needle type: insulated echogenic nerve stimulator needle   Needle gauge: 21 G  Needle localization: ultrasound guidance  Needle length: 10 cm  Assessment   Injection assessment: negative aspiration for heme, no paresthesia on injection, local visualized surrounding nerve on ultrasound and no intravascular symptoms  Paresthesia pain: none  Slow fractionated injection: yes  Hemodynamics: stable  Real-time US image taken/store: yes  Outcomes: uncomplicated and patient tolerated procedure well    Additional Notes  -Block placed for post op pain at surgeon's request.     -Ultrasound used to identify anatomy of nerve bundle. -Needle placement and local injection at perineural area confirmed with real time ultrasound guidance.     -Local visualized with ultrasound surrounding nerve. -Permanent Image taken and placed on chart.       Medications Administered  ropivacaine 0.5% with epinephrine 1:[id number] injection (ANESTHESIA USE ONLY) (Mixture components: EPINEPHrine PF 1 MG/ML Soln, 0.005 mL; ropivacaine 0.5% Soln, 1 mL) - Perineural   20 mL - 8/8/2023

## 2023-08-08 NOTE — ANESTHESIA PRE PROCEDURE
Department of Anesthesiology  Preprocedure Note       Name:  Priya Montaño   Age:  75 y.o.  :  1950                                          MRN:  [id number]         Date:  2023      Surgeon: Janet Alvarez):  Richard Garner MD    Procedure: Procedure(s):  left achilles lengthening and total ankle arthroplasty    Medications prior to admission:   Prior to Admission medications    Medication Sig Start Date End Date Taking? Authorizing Provider   omeprazole (PRILOSEC) 40 MG delayed release capsule Take 1 capsule by mouth with breakfast and with evening meal TAKE 1 CAPSULE BY MOUTH ONCE DAILY 23  Tabby De La Torre Ala, MD   ondansetron (ZOFRAN) 4 MG tablet Take 1 tablet by mouth 3 times daily as needed for Nausea or Vomiting 23   Abby Koenig DO   docusate sodium (COLACE) 100 MG capsule Take 1 tablet by mouth as needed    Historical Provider, MD   Multiple Vitamins-Minerals (MULTIVITAMIN ADULTS PO) Take 1 tablet by mouth daily    Historical Provider, MD   acetaminophen (TYLENOL) 500 MG tablet Take 2 tablets by mouth every 6 hours as needed 20   Ar Automatic Reconciliation   aspirin 81 MG EC tablet Take 1 tablet by mouth daily 20   Ar Automatic Reconciliation   cyanocobalamin 1000 MCG tablet Take 2.5 tablets by mouth daily    Ar Automatic Reconciliation   cyclobenzaprine (FLEXERIL) 5 MG tablet Take 1 tablet by mouth 3 times daily as needed  Patient not taking: Reported on 2023   Ar Automatic Reconciliation   gabapentin (NEURONTIN) 100 MG capsule Take 1 capsule by mouth 2 times daily.   Patient not taking: Reported on 2023   Ar Automatic Reconciliation   meloxicam (MOBIC) 7.5 MG tablet Take 1 tablet by mouth 2 times daily  Patient not taking: Reported on 2023    Ar Automatic Reconciliation   sertraline (ZOLOFT) 100 MG tablet Take 2 tablets by mouth nightly    Ar Automatic Reconciliation       Current medications:    No current facility-administered medications

## 2023-08-08 NOTE — OP NOTE
Operative Note    Patient:Octavio Johnson  MRN: [id number]    Date Of Surgery: 8/8/2023    Surgeon: Travis Li MD    Assistant Surgeon: None    Pre Op Diagnosis:  Pre-Op Diagnosis Codes:     * Ankle arthritis [M19.079]      Post Op Diagnosis:   same    Procedures Performed:  Left Total ankle arthroplasty, 78366  Left gastrocnemius recession, 65117  Left midfoot capsulotomy with extensor tendon lengthening, 80737  Left prophylactic fixation of tibia, 26962    Implants:   Implant Name Type Inv. Item Serial No.  Lot No. LRB No. Used Action   SCREW DARTFIRE EDGE [id number] - [id number]  SCREW DARTFIRE EDGE [id number]  [address] [facility] [address] [facility]- [id number] Left 1 Implanted   PIN FIX DIA2. 4MM FOR PROPHECY INBONE TOT ANK SYS [facility] - [id number]  PIN FIX DIA2. 4MM FOR PROPHECY INBONE TOT ANK SYS [facility] [employer] [id number] Left 14 Implanted   K WIRE FIX L228MM DIA1. 4MM FOR TOT ANK SYS INBONE II - [id number]  K WIRE FIX L228MM DIA1. 4MM FOR TOT ANK SYS [facility] II  [employer] [id number] Left 2 Implanted   STEM TIB [id number] BILAT ANK PLSM MID ANITA PROPHECY [facility] - [id number]  STEM TIB [id number] BILAT ANK PLSM MID ANITA PROPHECY [facility] [employer]-WD [id number] Left 1 Implanted   STEM TIB [id number] BILAT ANK PLSM MID ANITA PROPHECY INBONE - [id number]  STEM TIB [id number] BILAT ANK PLSM MID ANITA PROPHECY [facility] [employer] [id number] Left 1 Implanted   STEM TIB [id number] BILAT ANK PLSM TOP ANITA PROPHECY INBONE - [id number]  STEM TIB [id number] BILAT ANK PLSM TOP ANITA PROPHECY [facility] [employer] [id number] Left 1 Implanted   STEM TIB [id number] BILAT ANK PLSM BASE ANITA PROPHECY [facility] - [id number]  STEM TIB [id number] BILAT ANK PLSM BASE ANITA PROPHECY [facility] [employer] [id number] Left 1 Implanted   TRAY TIB SZ 3 LNG L DANNA PROPHECY [id number]  TRAY TIB SZ 3 LNG L DANNA [employer]HECY [facility] [employer] [facility]-

## 2023-08-08 NOTE — PERIOP NOTE
PACU DISCHARGE NOTE    Pt and wife verbalized understanding of discharge inst. Pt tolerated  po fluids well. Vital signs stable, pain well controlled, alert and oriented times three or at baseline, follow up per surgeon, no anesthetic complications.

## 2023-08-08 NOTE — ANESTHESIA POSTPROCEDURE EVALUATION
Department of Anesthesiology  Postprocedure Note    Patient: Yina Escalera  MRN: [id number]  YOB: 1950  Date of evaluation: 8/8/2023      Procedure Summary     Date: 08/08/23 Room / Location: [facility] OP OR 03 / [facility]    Anesthesia Start: 0702 Anesthesia Stop: 5500    Procedure: left achilles lengthening and total ankle arthroplasty (Left: Ankle) Diagnosis:       Ankle arthritis      (Ankle arthritis [M19.079])    Surgeons: Philipp Bloch, MD Responsible Provider: Robe Johnson MD    Anesthesia Type: TIVA ASA Status: 2          Anesthesia Type: TIVA    Aleksandr Phase I: Aleksandr Score: 9    Aleksandr Phase II: Aleksandr Score: 10      Anesthesia Post Evaluation    Patient location during evaluation: PACU  Patient participation: complete - patient participated  Level of consciousness: awake  Airway patency: patent  Nausea & Vomiting: no nausea  Complications: no  Cardiovascular status: hemodynamically stable  Respiratory status: acceptable and nonlabored ventilation  Hydration status: stable  Multimodal analgesia pain management approach

## 2023-08-08 NOTE — ANESTHESIA PROCEDURE NOTES
Peripheral Block    Patient location during procedure: pre-op  Reason for block: post-op pain management and at surgeon's request  Start time: 8/8/2023 6:52 AM  End time: 8/8/2023 6:53 AM  Staffing  Performed: anesthesiologist   Anesthesiologist: Emeterio Aschoff, MD  Preanesthetic Checklist  Completed: patient identified, IV checked, site marked, risks and benefits discussed, surgical/procedural consents, equipment checked, pre-op evaluation, timeout performed, anesthesia consent given, oxygen available and monitors applied/VS acknowledged  Peripheral Block   Patient position: supine  Prep: ChloraPrep  Provider prep: mask  Patient monitoring: cardiac monitor, continuous pulse ox, frequent blood pressure checks, IV access and oxygen  Block type: Femoral  Adductor canal  Laterality: left  Injection technique: single-shot  Guidance: ultrasound guided  Local infiltration: ropivacaine  Local infiltration: ropivacaine    Needle   Needle type: insulated echogenic nerve stimulator needle   Needle gauge: 21 G  Needle localization: ultrasound guidance  Needle length: 10 cm  Assessment   Injection assessment: negative aspiration for heme, no paresthesia on injection, local visualized surrounding nerve on ultrasound and no intravascular symptoms  Paresthesia pain: none  Slow fractionated injection: yes  Hemodynamics: stable  Real-time US image taken/store: yes  Outcomes: patient tolerated procedure well and uncomplicated    Additional Notes  -Block placed for post op pain at surgeon's request.     -Ultrasound used to identify anatomy of nerve bundle. -Needle placement and local injection at perineural area confirmed with real time ultrasound guidance.     -Local visualized with ultrasound surrounding nerve. -Permanent Image taken and placed on chart.       Medications Administered  dexamethasone (DECADRON) (PF) 10 mg/mL injection - Other   2 mg - 8/8/2023 6:52:00 AM  mepivacaine 1.5% with epinephrine 1:200,000 injection

## 2023-08-11 ENCOUNTER — TELEPHONE (OUTPATIENT)
Dept: ORTHOPEDIC SURGERY | Age: 73
End: 2023-08-11

## 2023-08-11 DIAGNOSIS — M19.072 PRIMARY OSTEOARTHRITIS, LEFT ANKLE AND FOOT: Primary | ICD-10-CM

## 2023-08-11 RX ORDER — OXYCODONE HYDROCHLORIDE 5 MG/1
5 TABLET ORAL EVERY 6 HOURS PRN
Qty: 20 TABLET | Refills: 0 | Status: SHIPPED | OUTPATIENT
Start: 2023-08-13 | End: 2023-08-18

## 2023-08-11 NOTE — TELEPHONE ENCOUNTER
He is requesting a refill on Oxycodone to the [facility] on file. He will run out over the weekend.

## 2023-08-21 ENCOUNTER — OFFICE VISIT (OUTPATIENT)
Dept: ORTHOPEDIC SURGERY | Age: 73
End: 2023-08-21

## 2023-08-21 DIAGNOSIS — M19.072 PRIMARY OSTEOARTHRITIS, LEFT ANKLE AND FOOT: Primary | ICD-10-CM

## 2023-08-21 PROCEDURE — 99024 POSTOP FOLLOW-UP VISIT: CPT | Performed by: NURSE PRACTITIONER

## 2023-08-21 NOTE — PROGRESS NOTES
3 weeks sooner if needed for cast off x-ray. Studies ordered:  Ankle XR needed @ Next Visit    Weight-bearing status: NWB        Return to work/work restrictions: none  No medications given

## 2023-09-20 ENCOUNTER — OFFICE VISIT (OUTPATIENT)
Dept: ORTHOPEDIC SURGERY | Age: 73
End: 2023-09-20

## 2023-09-20 DIAGNOSIS — M19.072 PRIMARY OSTEOARTHRITIS, LEFT ANKLE AND FOOT: Primary | ICD-10-CM

## 2023-09-20 DIAGNOSIS — M19.079 ANKLE ARTHRITIS: ICD-10-CM

## 2023-09-20 PROCEDURE — 99024 POSTOP FOLLOW-UP VISIT: CPT | Performed by: NURSE PRACTITIONER

## 2023-09-20 NOTE — PROGRESS NOTES
The patient was prescribed a walker boot for the patient's left foot. The patient wears a size 10.5 shoe and I fitted them with a L size boot. The patient was fitted and instructed on the use of prescribed walker boot. I explained how to fit themselves and that the plastic flexible piece should always be on the front of the boot and secured by the Velcro straps on top. The air bladder in the boot was adjusted according to proper fit and comfort. The patient walked a short distance and acknowledged satisfaction with current fit. I also explained that they need a heel lift or a higher heeled shoe for the uninvolved LE to help normalize gait and avoid excessive low back stress/strain due to leg length inequality created from walker boot. Patient read and signed documenting they understand and agree to [facility]'s current DME return policy.
visit.  Questions and concerns were addressed, he verbalized understanding of today's conversation. Plan:   3 This is stable chronic illness/condition  Treatment at this time: Cast was removed, patient will be placed into a cam walker boot as a matter medical necessity where he may begin to bear weight as the patient can tolerate and swelling allows. The affected extremity may now get wet including showering and soaking, recommendation of Epsom salts was given to help with additional incisional healing as well as swelling purposes. He Was given information regarding even up sole today to help with gait and imbalance issues while wearing the boot. The patient will refrain from high-impact activities at this time. He Was encouraged to continue elevating the affected extremity for swelling. Daily aspirin therapy for DVT prophylaxis may now be discontinued. The patient will follow-up in approximately 5 weeks or sooner if needed with x-ray. Studies ordered:  Ankle XR needed @ Next Visit    Weight-bearing status: WBAT in Boot/hardsole shoe        Return to work/work restrictions: none  No medications given

## 2023-10-25 ENCOUNTER — OFFICE VISIT (OUTPATIENT)
Dept: ORTHOPEDIC SURGERY | Age: 73
End: 2023-10-25

## 2023-10-25 DIAGNOSIS — M19.079 ANKLE ARTHRITIS: Primary | ICD-10-CM

## 2023-10-25 DIAGNOSIS — M19.072 PRIMARY OSTEOARTHRITIS, LEFT ANKLE AND FOOT: ICD-10-CM

## 2023-10-25 NOTE — PROGRESS NOTES
mobility of the affected foot and ankle. Physical activities may be resumed as tolerated excluding high-impact at this time. The patient will follow-up in approximately 3 months or sooner if needed with x-ray. Studies ordered:  Ankle XR needed @ Next Visit    Weight-bearing status: WBAT        Return to work/work restrictions: none  No medications given

## 2023-11-10 ENCOUNTER — HOSPITAL ENCOUNTER (OUTPATIENT)
Dept: MRI IMAGING | Age: 73
End: 2023-11-10
Attending: UROLOGY
Payer: MEDICARE

## 2023-11-10 DIAGNOSIS — R97.20 ELEVATED PSA: ICD-10-CM

## 2023-11-10 PROCEDURE — A9579 GAD-BASE MR CONTRAST NOS,1ML: HCPCS | Performed by: UROLOGY

## 2023-11-10 PROCEDURE — 6360000004 HC RX CONTRAST MEDICATION: Performed by: UROLOGY

## 2023-11-10 PROCEDURE — 72197 MRI PELVIS W/O & W/DYE: CPT

## 2023-11-10 RX ADMIN — GADOTERIDOL 20 ML: 279.3 INJECTION, SOLUTION INTRAVENOUS at 07:49

## 2023-11-15 DIAGNOSIS — R97.20 ELEVATED PSA: Primary | ICD-10-CM

## 2023-11-17 NOTE — PROGRESS NOTES
awakenings. He falls to sleep early in the evening and was told to avoid napping that day. An inlab study will be best to determine the severity of his apnea given his poor sleep quality and dozing off/on. I think a home sleep would underestimate the severity of his apnea. The pathophysiology of obstructive sleep apnea was reviewed with the patient. It's potential to promote severe neurologic, cardiac, pulmonary, and gastrointestinal problems was discussed. Specifically, the increased incidence of hypertension, coronary artery disease, congestive heart failure, pulmonary hypertension, gastroesophageal reflux, pathologic hypersomnolence, memory loss, and glucose intolerance was related to the consequences of hypoxemia, hypercapnia, airway obstruction, and sympathetic overdrive. We also discussed the ability of nasal CPAP to correct these abnormalities through maintenance of a patent airway. Therapeutic options including surgery, oral appliances, and weight loss were also reviewed. 2. Non-restorative sleep  Ambulatory Referral to Sleep Studies   With very poor sleep quality      3. Persistent disorder of initiating or maintaining sleep  Ambulatory Referral to Sleep Studies   He falls to sleep easily but wakes in the night unable to fall back to sleep. Consider ferritin level in the future If limb movements are persistent after evaluation and treatment of sleep disordered breathing            PLAN:  Split night PSG  Use nasal mask night for study   No napping the day of the study. he will drink his morning coffee and avoid caffeine the rest of the day. No alcohol day of study.        Follow up with the [facility] Center will be after sleep study or sooner if needed       Orders Placed This Encounter   Procedures    Ambulatory Referral to Sleep Studies     Referral Priority:   Routine     Referral Type:   Consult for Advice and Opinion     Referral Reason:   Specialty Services Required     Number of Visits

## 2023-11-20 ENCOUNTER — TELEMEDICINE (OUTPATIENT)
Dept: SLEEP MEDICINE | Age: 73
End: 2023-11-20
Payer: MEDICARE

## 2023-11-20 DIAGNOSIS — G47.8 NON-RESTORATIVE SLEEP: ICD-10-CM

## 2023-11-20 DIAGNOSIS — R06.83 SNORING: Primary | ICD-10-CM

## 2023-11-20 DIAGNOSIS — G47.00 PERSISTENT DISORDER OF INITIATING OR MAINTAINING SLEEP: ICD-10-CM

## 2023-11-20 PROCEDURE — 1123F ACP DISCUSS/DSCN MKR DOCD: CPT | Performed by: NURSE PRACTITIONER

## 2023-11-20 PROCEDURE — 99203 OFFICE O/P NEW LOW 30 MIN: CPT | Performed by: NURSE PRACTITIONER

## 2023-11-20 PROCEDURE — 3017F COLORECTAL CA SCREEN DOC REV: CPT | Performed by: NURSE PRACTITIONER

## 2023-11-20 PROCEDURE — G8427 DOCREV CUR MEDS BY ELIG CLIN: HCPCS | Performed by: NURSE PRACTITIONER

## 2023-11-20 ASSESSMENT — SLEEP AND FATIGUE QUESTIONNAIRES
HOW LIKELY ARE YOU TO NOD OFF OR FALL ASLEEP WHILE WATCHING TV: 0
HOW LIKELY ARE YOU TO NOD OFF OR FALL ASLEEP IN A CAR, WHILE STOPPED FOR A FEW MINUTES IN TRAFFIC: 0
HOW LIKELY ARE YOU TO NOD OFF OR FALL ASLEEP WHILE SITTING AND READING: 0
HOW LIKELY ARE YOU TO NOD OFF OR FALL ASLEEP WHILE SITTING QUIETLY AFTER LUNCH WITHOUT ALCOHOL: 0
HOW LIKELY ARE YOU TO NOD OFF OR FALL ASLEEP WHILE SITTING INACTIVE IN A PUBLIC PLACE: 0
ESS TOTAL SCORE: 0
HOW LIKELY ARE YOU TO NOD OFF OR FALL ASLEEP WHEN YOU ARE A PASSENGER IN A CAR FOR AN HOUR WITHOUT A BREAK: 0
HOW LIKELY ARE YOU TO NOD OFF OR FALL ASLEEP WHILE SITTING AND TALKING TO SOMEONE: 0
HOW LIKELY ARE YOU TO NOD OFF OR FALL ASLEEP WHILE LYING DOWN TO REST IN THE AFTERNOON WHEN CIRCUMSTANCES PERMIT: 0

## 2023-11-20 NOTE — PATIENT INSTRUCTIONS
You will get a call from [facility] to schedule your in-lab PSG. Once the test is completed, a physician will read the study. You will be contacted from our office to discuss results, get started on PAP therapy, or schedule a follow up visit to discuss treatment options.

## 2023-12-05 ENCOUNTER — NURSE ONLY (OUTPATIENT)
Dept: UROLOGY | Age: 73
End: 2023-12-05

## 2023-12-05 DIAGNOSIS — R97.20 ELEVATED PSA: ICD-10-CM

## 2023-12-05 LAB — PSA SERPL-MCNC: 9.7 NG/ML

## 2023-12-12 ENCOUNTER — OFFICE VISIT (OUTPATIENT)
Dept: UROLOGY | Age: 73
End: 2023-12-12
Payer: MEDICARE

## 2023-12-12 DIAGNOSIS — R97.20 ELEVATED PSA: Primary | ICD-10-CM

## 2023-12-12 LAB
BILIRUBIN, URINE, POC: NEGATIVE
BLOOD URINE, POC: NORMAL
GLUCOSE URINE, POC: NEGATIVE
KETONES, URINE, POC: NEGATIVE
LEUKOCYTE ESTERASE, URINE, POC: NEGATIVE
NITRITE, URINE, POC: NEGATIVE
PH, URINE, POC: 5.5 (ref 4.6–8)
PROTEIN,URINE, POC: NEGATIVE
SPECIFIC GRAVITY, URINE, POC: 1.02 (ref 1–1.03)
URINALYSIS CLARITY, POC: NORMAL
URINALYSIS COLOR, POC: NORMAL
UROBILINOGEN, POC: NORMAL

## 2023-12-12 PROCEDURE — 3017F COLORECTAL CA SCREEN DOC REV: CPT | Performed by: NURSE PRACTITIONER

## 2023-12-12 PROCEDURE — 99213 OFFICE O/P EST LOW 20 MIN: CPT | Performed by: NURSE PRACTITIONER

## 2023-12-12 PROCEDURE — G8417 CALC BMI ABV UP PARAM F/U: HCPCS | Performed by: NURSE PRACTITIONER

## 2023-12-12 PROCEDURE — G8427 DOCREV CUR MEDS BY ELIG CLIN: HCPCS | Performed by: NURSE PRACTITIONER

## 2023-12-12 PROCEDURE — G8484 FLU IMMUNIZE NO ADMIN: HCPCS | Performed by: NURSE PRACTITIONER

## 2023-12-12 PROCEDURE — 1036F TOBACCO NON-USER: CPT | Performed by: NURSE PRACTITIONER

## 2023-12-12 PROCEDURE — 1123F ACP DISCUSS/DSCN MKR DOCD: CPT | Performed by: NURSE PRACTITIONER

## 2023-12-12 PROCEDURE — 81003 URINALYSIS AUTO W/O SCOPE: CPT | Performed by: NURSE PRACTITIONER

## 2023-12-12 ASSESSMENT — ENCOUNTER SYMPTOMS: BACK PAIN: 0

## 2023-12-12 NOTE — PROGRESS NOTES
Housing Stability: Not on file     Family History   Problem Relation Age of Onset    Cancer Mother         breast    Diabetes Mother     Hypertension Mother     Heart Attack Father     Hypertension Brother        Review of Systems  Constitutional:   Negative for fever. Genitourinary:  Negative for hematuria. Musculoskeletal:  Negative for back pain. Urinalysis  UA - Dipstick  Results for orders placed or performed in visit on 12/12/23   AMB POC URINALYSIS DIP STICK AUTO W/O MICRO   Result Value Ref Range    Color (UA POC)      Clarity (UA POC)      Glucose, Urine, POC Negative     Bilirubin, Urine, POC Negative     KETONES, Urine, POC Negative     Specific Gravity, Urine, POC 1.025 1.001 - 1.035    Blood (UA POC) Trace-intact     pH, Urine, POC 5.5 4.6 - 8.0    Protein, Urine, POC Negative     Urobilinogen, POC 0.2 mg/dL     Nitrite, Urine, POC Negative     Leukocyte Esterase, Urine, POC Negative        PHYSICAL EXAM    General appearance - well appearing and in no distress  Mental status - alert, oriented to person, place, and time  Neck - supple, no significant adenopathy  Chest/Lung-  Quiet, even and easy respiratory effort without use of accessory muscles  Skin - normal coloration and turgor, no rashes        Assessment and Plan    ICD-10-CM    1. Elevated PSA  R97.20 AMB POC URINALYSIS DIP STICK AUTO W/O MICRO     PSA, Diagnostic      PSA trending down. We discussed options for repeat PSA in 3 months vs bx. He opts to repeat level. Lab visit will be arranged. Will call results. To call sooner if needed. Desmond Marks is supervising physician today and he approves plan of care.

## 2023-12-13 ENCOUNTER — TELEPHONE (OUTPATIENT)
Dept: UROLOGY | Age: 73
End: 2023-12-13

## 2023-12-13 ENCOUNTER — PREP FOR PROCEDURE (OUTPATIENT)
Dept: UROLOGY | Age: 73
End: 2023-12-13

## 2023-12-13 DIAGNOSIS — R97.20 ELEVATED PROSTATE SPECIFIC ANTIGEN (PSA): ICD-10-CM

## 2023-12-13 DIAGNOSIS — C61 PROSTATE CANCER (HCC): Primary | ICD-10-CM

## 2023-12-13 NOTE — TELEPHONE ENCOUNTER
The patient is scheduled for a prostate biopsy under anesthesia with Dr Garg on 1/22.  Ok to hold ASA 81 mg 5 days prior?

## 2023-12-13 NOTE — TELEPHONE ENCOUNTER
----- Message from Jose Manuel Garg DO sent at 12/13/2023  1:27 PM EST -----  Regarding: FW: Biopsy, change of plans  Contact: [phone]  MRI fusion prostate biopsies  30min  Ua day of  Rocephin 1g IV preop  Outpt    ----- Message -----  From: Kalyani George RN  Sent: 12/13/2023   9:43 AM EST  To: Jose Manuel Garg DO  Subject: FW: Biopsy, change of plans                      Patient has decided to have biopsy.  You last saw him in August of this year, does he need another ov to discuss?  ----- Message -----  From: Natalya Guerra APRN - CNP  Sent: 12/13/2023   8:25 AM EST  To: Kalyani George RN  Subject: RE: Biopsy, change of plans                      Can you work this pt in with Dr. Garg to discuss bx?     ----- Message -----  From: Manfred Sol MA  Sent: 12/13/2023   8:10 AM EST  To: RAJEEV Santana CNP  Subject: FW: Biopsy, change of plans                      Set up discussion with ?  ----- Message -----  From: Octavio Garcia  Sent: 12/13/2023   1:22 AM EST  To: #  Subject: Biopsy, change of plans                          My wife wants me to have the biopsy, so please set me up. Thanks

## 2024-01-02 ENCOUNTER — HOSPITAL ENCOUNTER (OUTPATIENT)
Dept: SLEEP MEDICINE | Age: 74
Discharge: HOME | End: 2024-01-05

## 2024-01-10 ENCOUNTER — TELEPHONE (OUTPATIENT)
Dept: SLEEP MEDICINE | Age: 74
End: 2024-01-10

## 2024-01-10 NOTE — TELEPHONE ENCOUNTER
Pt's Split Study, done on 1/2/24, showed AHI 22.4  Sao2 71%. Please schedule a New Pt Psg appointment.

## 2024-01-12 ENCOUNTER — OFFICE VISIT (OUTPATIENT)
Dept: SLEEP MEDICINE | Age: 74
End: 2024-01-12
Payer: MEDICARE

## 2024-01-12 VITALS
SYSTOLIC BLOOD PRESSURE: 140 MMHG | HEART RATE: 73 BPM | HEIGHT: 67 IN | RESPIRATION RATE: 16 BRPM | TEMPERATURE: 97.4 F | BODY MASS INDEX: 32.49 KG/M2 | OXYGEN SATURATION: 93 % | DIASTOLIC BLOOD PRESSURE: 78 MMHG | WEIGHT: 207 LBS

## 2024-01-12 DIAGNOSIS — G47.00 ORGANIC INSOMNIA: ICD-10-CM

## 2024-01-12 DIAGNOSIS — G47.33 OSA (OBSTRUCTIVE SLEEP APNEA): Primary | ICD-10-CM

## 2024-01-12 PROCEDURE — 1036F TOBACCO NON-USER: CPT | Performed by: INTERNAL MEDICINE

## 2024-01-12 PROCEDURE — 1123F ACP DISCUSS/DSCN MKR DOCD: CPT | Performed by: INTERNAL MEDICINE

## 2024-01-12 PROCEDURE — 99214 OFFICE O/P EST MOD 30 MIN: CPT | Performed by: INTERNAL MEDICINE

## 2024-01-12 PROCEDURE — G8427 DOCREV CUR MEDS BY ELIG CLIN: HCPCS | Performed by: INTERNAL MEDICINE

## 2024-01-12 PROCEDURE — G8484 FLU IMMUNIZE NO ADMIN: HCPCS | Performed by: INTERNAL MEDICINE

## 2024-01-12 PROCEDURE — 3017F COLORECTAL CA SCREEN DOC REV: CPT | Performed by: INTERNAL MEDICINE

## 2024-01-12 PROCEDURE — G8417 CALC BMI ABV UP PARAM F/U: HCPCS | Performed by: INTERNAL MEDICINE

## 2024-01-12 ASSESSMENT — SLEEP AND FATIGUE QUESTIONNAIRES
HOW LIKELY ARE YOU TO NOD OFF OR FALL ASLEEP WHILE WATCHING TV: 1
HOW LIKELY ARE YOU TO NOD OFF OR FALL ASLEEP WHILE SITTING AND TALKING TO SOMEONE: 0
HOW LIKELY ARE YOU TO NOD OFF OR FALL ASLEEP IN A CAR, WHILE STOPPED FOR A FEW MINUTES IN TRAFFIC: 0
HOW LIKELY ARE YOU TO NOD OFF OR FALL ASLEEP WHEN YOU ARE A PASSENGER IN A CAR FOR AN HOUR WITHOUT A BREAK: 0
HOW LIKELY ARE YOU TO NOD OFF OR FALL ASLEEP WHILE SITTING AND READING: 1
NECK CIRCUMFERENCE (INCHES): 17
HOW LIKELY ARE YOU TO NOD OFF OR FALL ASLEEP WHILE SITTING QUIETLY AFTER LUNCH WITHOUT ALCOHOL: 0
ESS TOTAL SCORE: 2
HOW LIKELY ARE YOU TO NOD OFF OR FALL ASLEEP WHILE LYING DOWN TO REST IN THE AFTERNOON WHEN CIRCUMSTANCES PERMIT: 0
HOW LIKELY ARE YOU TO NOD OFF OR FALL ASLEEP WHILE SITTING INACTIVE IN A PUBLIC PLACE: 0

## 2024-01-12 NOTE — ASSESSMENT & PLAN NOTE
Patient has moderate OBSTRUCTIVE SLEEP APNEA with an AHI of 22, severe in room with a REM AHI 74.  We discussed treatment options including oral appliance therapy, CPAP, weight loss, ENT surgery.  Patient has elected to start CPAP.  We will order APAP 12-14 cm H2O today.  Sleep study reviewed in detail, all questions answered.   Educational handout given on ZAINAB/CPAP.  We will see patient back in 2 to 3 months to assess compliance and efficacy of therapy.  Insurance compliance criteria discussed.

## 2024-01-12 NOTE — PATIENT INSTRUCTIONS
It was a pleasure meeting you today.  Here are some items that we discussed:    1.   We will start cpap to treat your moderate obstructive sleep apnea.  Your settings will be 12-14 cm H20.    2.   [facility] will be your suplier, they should call by midweek next week    Address: [address] [place], [place] [address] [address]  Phone: [phone]  Fax: [phone]    3.   If insomnia is of concern, keep a sleep log for my review and bring to followup appointment.  Have at least 4 weeks worth of data.  -avoid caffeine after 1pm  -avoid naps like the plague, this can make it hard to fall asleep at night      Dieter Allison MD  [phone]  Sleep Apnea: Care Instructions  Overview     Sleep apnea means that you frequently stop breathing for 10 seconds or longer during sleep. It can be mild to severe, based on the number of times an hour that you stop breathing.  Blocked or narrowed airways in your nose, mouth, or throat can cause sleep apnea. Your airway can become blocked when your throat muscles and tongue relax during sleep.  You can help treat sleep apnea at home by making lifestyle changes. You also can use a CPAP breathing machine that keeps tissues in the throat from blocking your airway. Or your doctor may suggest that you use a breathing device while you sleep. It helps keep your airway open. This could be a device that you put in your mouth. In some cases, surgery may be needed to remove enlarged tissues in the throat.  Follow-up care is a key part of your treatment and safety. Be sure to make and go to all appointments, and call your doctor if you are having problems. It's also a good idea to know your test results and keep a list of the medicines you take.  How can you care for yourself at home?  Lose weight, if needed.  Sleep on your side. It may help mild apnea.  Avoid alcohol and medicines such as sleeping pills, opioids, or sedatives before bed.  Don't smoke. If you need help quitting, talk to your doctor.  Prop

## 2024-01-12 NOTE — PROGRESS NOTES
[facility] Sleep [facility] note    Octavio Garcia is a 73 y.o. male with a chief complaint of New Patient, Sleep Study, and Sleep Apnea       His primary provider is Kemmerlin, Richard W, MD           Subjective:  Octavio Ludwig is here for followup on sleep lead testing.  He was last seen by Amina on 11/20/2023.  He has a past medical history notable for obesity with a BMI 31, gastroesophageal reflux disease, depression, left hip osteoarthritis, history of colon cancer, history of acoustic neuroma.  No new medication changes or medical problems since last visit.  He would like to review the results of his study, he also notes his sleep patterns are unchanged for he will typically fall asleep in the late afternoon early evening and then when he gets in bed has a hard time staying asleep all night from around midnight to 530, he denies any daytime sleepiness and notes this is \"how it is always been \".  He does have some family members who did not have a good experience with CPAP but he felt like he actually slept well on the night of the study when they used in a Eson to nasal mask size medium.  He is a bit hesitant to try a bigger mask as he does not want to have a lot on his face.  He is expecting to get some partials for his teeth in the near future.  He does wake up with headaches in the morning even when he has not been drinking.     In terms of his insomnia, he will have caffeine in the form of Coke as late as dinnertime, napping unintentionally in the late afternoon early evening.  No sleep log for my review.  He does not take any sleep aids.        1/12/2024     8:08 AM 11/20/2023     8:31 AM   Sleep Medicine   Sitting and reading 1 0   Watching TV 1 0   Sitting, inactive in a public place (e.g. a theatre or a meeting) 0 0   As a passenger in a car for an hour without a break 0 0   Lying down to rest in the afternoon when circumstances permit 0 0   Sitting and talking to someone 0 0   Sitting quietly after a

## 2024-01-12 NOTE — ASSESSMENT & PLAN NOTE
Patient has chronic insomnia, etiology unclear.  A 4-week sleep log will be completed to evaluate sleep patterns over time and guide therapy.  We did discuss sleep hygiene as well as hypnotic therapy in general.  Specific behavioral measures given today that patient can work on between now and next visit.     I do suspect combination of psychophysiologic insomnia with poor sleep hygiene as well as insomnia with comorbid ZAINAB.

## 2024-01-18 NOTE — PROGRESS NOTES
Patient verified name and .    Order for consent found in EHR and matches case posting; patient verifies procedure.     Type 1A surgery, phone assessment complete.  Orders received.  Labs per surgeon: NONE  Labs per anesthesia protocol: NONE    Patient answered medical/surgical history questions at their best of ability. All prior to admission medications documented in EPIC.    Patient instructed to take the following medications the day of surgery according to anesthesia guidelines with a small sip of water: Gabapentin, Omeprazole.Hold all vitamins 7 days prior to surgery and NSAIDS 5 days prior to surgery. Prescription meds to hold: Aspirin 81 mg (preventative only-pt denies heart attack, stroke, blood clots, stents)    Patient instructed on the following:    > Arrive at [facility] MAIN Entrance, time of arrival to be called the day before by 1700  > NPO after midnight, unless otherwise indicated, including gum, mints, and ice chips  > Responsible adult must drive patient to the hospital, stay during surgery, and patient will need supervision 24 hours after anesthesia  > Use non moisturizing soap in shower the night before surgery and on the morning of surgery  > All piercings must be removed prior to arrival.    > Leave all valuables (money and jewelry) at home but bring insurance card and ID on DOS.   > You may be required to pay a deductible or co-pay on the day of your procedure. You can pre-pay by calling [phone] if your surgery is at the [facility] or [phone] if your surgery is at the [facility].  > Do not wear make-up, nail polish, lotions, cologne, perfumes, powders, or oil on skin. Artificial nails are not permitted.

## 2024-01-21 ENCOUNTER — TELEPHONE (OUTPATIENT)
Dept: UROLOGY | Age: 74
End: 2024-01-21

## 2024-01-22 ENCOUNTER — ANESTHESIA (OUTPATIENT)
Dept: SURGERY | Age: 74
End: 2024-01-22
Payer: MEDICARE

## 2024-01-22 ENCOUNTER — ANESTHESIA EVENT (OUTPATIENT)
Dept: SURGERY | Age: 74
End: 2024-01-22
Payer: MEDICARE

## 2024-01-22 ENCOUNTER — HOSPITAL ENCOUNTER (OUTPATIENT)
Age: 74
Setting detail: OUTPATIENT SURGERY
Discharge: HOME | End: 2024-01-22
Attending: UROLOGY | Admitting: UROLOGY
Payer: MEDICARE

## 2024-01-22 VITALS
BODY MASS INDEX: 30.77 KG/M2 | DIASTOLIC BLOOD PRESSURE: 59 MMHG | HEIGHT: 68 IN | WEIGHT: 203 LBS | TEMPERATURE: 97 F | SYSTOLIC BLOOD PRESSURE: 124 MMHG | HEART RATE: 74 BPM | OXYGEN SATURATION: 93 % | RESPIRATION RATE: 18 BRPM

## 2024-01-22 LAB
APPEARANCE UR: CLEAR
BILIRUB UR QL: NEGATIVE
COLOR UR: NORMAL
GLUCOSE UR STRIP.AUTO-MCNC: NEGATIVE MG/DL
HGB UR QL STRIP: NEGATIVE
KETONES UR QL STRIP.AUTO: NEGATIVE MG/DL
LEUKOCYTE ESTERASE UR QL STRIP.AUTO: NEGATIVE
NITRITE UR QL STRIP.AUTO: NEGATIVE
PH UR STRIP: 5 (ref 5–9)
PROT UR STRIP-MCNC: NEGATIVE MG/DL
SP GR UR REFRACTOMETRY: 1.02 (ref 1–1.02)
UROBILINOGEN UR QL STRIP.AUTO: 1 EU/DL (ref 0.2–1)

## 2024-01-22 PROCEDURE — 2580000003 HC RX 258: Performed by: ANESTHESIOLOGY

## 2024-01-22 PROCEDURE — 6360000002 HC RX W HCPCS: Performed by: UROLOGY

## 2024-01-22 PROCEDURE — 55700 PR PROSTATE NEEDLE BIOPSY ANY APPROACH: CPT | Performed by: UROLOGY

## 2024-01-22 PROCEDURE — 7100000011 HC PHASE II RECOVERY - ADDTL 15 MIN: Performed by: UROLOGY

## 2024-01-22 PROCEDURE — 3600000012 HC SURGERY LEVEL 2 ADDTL 15MIN: Performed by: UROLOGY

## 2024-01-22 PROCEDURE — 6360000002 HC RX W HCPCS: Performed by: NURSE ANESTHETIST, CERTIFIED REGISTERED

## 2024-01-22 PROCEDURE — 3700000001 HC ADD 15 MINUTES (ANESTHESIA): Performed by: UROLOGY

## 2024-01-22 PROCEDURE — 7100000000 HC PACU RECOVERY - FIRST 15 MIN: Performed by: UROLOGY

## 2024-01-22 PROCEDURE — 3700000000 HC ANESTHESIA ATTENDED CARE: Performed by: UROLOGY

## 2024-01-22 PROCEDURE — 7100000001 HC PACU RECOVERY - ADDTL 15 MIN: Performed by: UROLOGY

## 2024-01-22 PROCEDURE — 88305 TISSUE EXAM BY PATHOLOGIST: CPT

## 2024-01-22 PROCEDURE — 76872 US TRANSRECTAL: CPT | Performed by: UROLOGY

## 2024-01-22 PROCEDURE — 81003 URINALYSIS AUTO W/O SCOPE: CPT

## 2024-01-22 PROCEDURE — 7100000010 HC PHASE II RECOVERY - FIRST 15 MIN: Performed by: UROLOGY

## 2024-01-22 PROCEDURE — 2709999900 HC NON-CHARGEABLE SUPPLY: Performed by: UROLOGY

## 2024-01-22 PROCEDURE — 3600000002 HC SURGERY LEVEL 2 BASE: Performed by: UROLOGY

## 2024-01-22 PROCEDURE — 2500000003 HC RX 250 WO HCPCS: Performed by: NURSE ANESTHETIST, CERTIFIED REGISTERED

## 2024-01-22 PROCEDURE — 2580000003 HC RX 258: Performed by: UROLOGY

## 2024-01-22 RX ORDER — SODIUM CHLORIDE, SODIUM LACTATE, POTASSIUM CHLORIDE, CALCIUM CHLORIDE 600; 310; 30; 20 MG/100ML; MG/100ML; MG/100ML; MG/100ML
INJECTION, SOLUTION INTRAVENOUS CONTINUOUS
Status: DISCONTINUED | OUTPATIENT
Start: 2024-01-22 | End: 2024-01-22 | Stop reason: HOSPADM

## 2024-01-22 RX ORDER — OXYCODONE HYDROCHLORIDE 5 MG/1
5 TABLET ORAL
Status: DISCONTINUED | OUTPATIENT
Start: 2024-01-22 | End: 2024-01-22 | Stop reason: HOSPADM

## 2024-01-22 RX ORDER — LIDOCAINE HYDROCHLORIDE 20 MG/ML
INJECTION, SOLUTION EPIDURAL; INFILTRATION; INTRACAUDAL; PERINEURAL PRN
Status: DISCONTINUED | OUTPATIENT
Start: 2024-01-22 | End: 2024-01-22 | Stop reason: SDUPTHER

## 2024-01-22 RX ORDER — FENTANYL CITRATE 50 UG/ML
50 INJECTION, SOLUTION INTRAMUSCULAR; INTRAVENOUS PRN
Status: DISCONTINUED | OUTPATIENT
Start: 2024-01-22 | End: 2024-01-22 | Stop reason: HOSPADM

## 2024-01-22 RX ORDER — HYDROMORPHONE HYDROCHLORIDE 2 MG/ML
0.5 INJECTION, SOLUTION INTRAMUSCULAR; INTRAVENOUS; SUBCUTANEOUS EVERY 5 MIN PRN
Status: DISCONTINUED | OUTPATIENT
Start: 2024-01-22 | End: 2024-01-22 | Stop reason: HOSPADM

## 2024-01-22 RX ORDER — FENTANYL CITRATE 50 UG/ML
100 INJECTION, SOLUTION INTRAMUSCULAR; INTRAVENOUS PRN
Status: DISCONTINUED | OUTPATIENT
Start: 2024-01-22 | End: 2024-01-22 | Stop reason: HOSPADM

## 2024-01-22 RX ORDER — MIDAZOLAM HYDROCHLORIDE 2 MG/2ML
2 INJECTION, SOLUTION INTRAMUSCULAR; INTRAVENOUS
Status: DISCONTINUED | OUTPATIENT
Start: 2024-01-22 | End: 2024-01-22 | Stop reason: HOSPADM

## 2024-01-22 RX ORDER — PROPOFOL 10 MG/ML
INJECTION, EMULSION INTRAVENOUS PRN
Status: DISCONTINUED | OUTPATIENT
Start: 2024-01-22 | End: 2024-01-22 | Stop reason: SDUPTHER

## 2024-01-22 RX ORDER — LIDOCAINE HYDROCHLORIDE 10 MG/ML
1 INJECTION, SOLUTION INFILTRATION; PERINEURAL
Status: DISCONTINUED | OUTPATIENT
Start: 2024-01-22 | End: 2024-01-22 | Stop reason: HOSPADM

## 2024-01-22 RX ORDER — ONDANSETRON 2 MG/ML
4 INJECTION INTRAMUSCULAR; INTRAVENOUS
Status: DISCONTINUED | OUTPATIENT
Start: 2024-01-22 | End: 2024-01-22 | Stop reason: HOSPADM

## 2024-01-22 RX ADMIN — PROPOFOL 50 MG: 10 INJECTION, EMULSION INTRAVENOUS at 10:28

## 2024-01-22 RX ADMIN — SODIUM CHLORIDE, POTASSIUM CHLORIDE, SODIUM LACTATE AND CALCIUM CHLORIDE: 600; 310; 30; 20 INJECTION, SOLUTION INTRAVENOUS at 09:41

## 2024-01-22 RX ADMIN — PROPOFOL 50 MG: 10 INJECTION, EMULSION INTRAVENOUS at 10:37

## 2024-01-22 RX ADMIN — PROPOFOL 50 MG: 10 INJECTION, EMULSION INTRAVENOUS at 10:17

## 2024-01-22 RX ADMIN — LIDOCAINE HYDROCHLORIDE 50 MG: 20 INJECTION, SOLUTION EPIDURAL; INFILTRATION; INTRACAUDAL; PERINEURAL at 10:17

## 2024-01-22 RX ADMIN — PROPOFOL 50 MG: 10 INJECTION, EMULSION INTRAVENOUS at 10:19

## 2024-01-22 RX ADMIN — WATER 1000 MG: 1 INJECTION INTRAMUSCULAR; INTRAVENOUS; SUBCUTANEOUS at 10:22

## 2024-01-22 RX ADMIN — PROPOFOL 50 MG: 10 INJECTION, EMULSION INTRAVENOUS at 10:22

## 2024-01-22 ASSESSMENT — PAIN - FUNCTIONAL ASSESSMENT: PAIN_FUNCTIONAL_ASSESSMENT: 0-10

## 2024-01-22 NOTE — DISCHARGE INSTRUCTIONS
Tylenol 500mg po q4h prn discomfort.  Finish Cipro course.  RTO in 2 wks.       If you have had surgery in the past 7-10 days by one of our providers and are having fever, bleeding, or drainage from an incision, have an opening in an incision, or having issues urinating properly, please call [phone].    Prostate Biopsy and Ultrasound:   A prostate biopsy is a type of test. Your doctor takes small tissue samples from your prostate gland. Then another doctor looks at the tissue under a microscope to see if there are cancer cells.  This test is done by a doctor who specializes in men's genital and urinary problems (urologist). It can be done in your doctor's office, a day surgery clinic, or a hospital operating room. To get the tissue samples from the prostate, the doctor inserts a thin needle through the rectum, the urethra, or the area between the anus and scrotum (perineum). The most common method is through the rectum. Your doctor may use ultrasound to help guide the needle.  What else should you know about this test?  A prostate biopsy has a slight risk of causing problems such as infection or bleeding.  If the biopsy went through your rectum, you may have a small amount of bleeding from your rectum for 2 to 3 days after the biopsy.  You may have a little pain in your pelvic area. You may also have a little blood in your urine for 1 to 5 days.  You may have some blood in your semen for a week or longer.  Do not do heavy work or exercise for 4 hours after the test.  Your doctor will tell you how long it may take to get your results back.  Follow-up care is a key part of your treatment and safety. Be sure to make and go to all appointments, and call your doctor if you are having problems. It's also a good idea to keep a list of the medicines you take. Ask your doctor when you can expect to have your test results.    After general anesthesia or intravenous sedation, for 24 hours or while taking prescription

## 2024-01-22 NOTE — ANESTHESIA PRE PROCEDURE
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    Malignant neoplasm of ascending colon (HCC) C18.2    Dysphagia R13.10    GERD (gastroesophageal reflux disease) K21.9    MDD (major depressive disorder), single episode, in full remission ([facility]) F32.5    Food impaction of esophagus T18.128A, W44.F3XA    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    Elevated prostate specific antigen (PSA) R97.20    ZAINAB (obstructive sleep apnea) G47.33    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       Past Medical History:        Diagnosis Date    Anemia     per patient reports hx of 30 yrs ago    Anxiety     Arthritis     OA    Chronic pain     headaches    Cigar smoker     history     COVID-19 2020    not hospitalized    Dysphagia     GERD (gastroesophageal reflux disease)     controlled with medication    History of colon cancer     Major depressive disorder     managed with medication    Malignant neoplasm of ascending colon (HCC)     PUD (peptic ulcer disease) 1979    Rectal hemorrhage     Sinus tachycardia     Weakness of right upper extremity        Past Surgical History:        Procedure Laterality Date    ACHILLES TENDON SURGERY Left 8/8/2023    left achilles lengthening and total ankle arthroplasty performed by Lauro Oneil III, MD at [facility]    CATARACT REMOVAL Bilateral 2017    CERVICAL FUSION  2013    COLONOSCOPY      HEENT  1988    acoustic neuroma    LUMBAR FUSION  1992    TOTAL COLECTOMY Right 07/2019    TOTAL HIP ARTHROPLASTY Left 07/2020

## 2024-01-22 NOTE — H&P
[facility]  [address] [place] [address]  [phone]    Octavio Garcia  : 1950     HPI   73 y.o., male returns in follow up for an elevated PSA.  PSA was 9.7 on 23.  MRI on 11/10/23 shows a small ind LB lesion.        Past Medical History:   Diagnosis Date    Anemia     per patient reports hx of 30 yrs ago    Anxiety     Arthritis     OA    Chronic pain     headaches    Cigar smoker     history     COVID-2020    not hospitalized    Dysphagia     GERD (gastroesophageal reflux disease)     controlled with medication    History of colon cancer     Major depressive disorder     managed with medication    Malignant neoplasm of ascending colon (HCC)     PUD (peptic ulcer disease) 1979    Rectal hemorrhage     Sinus tachycardia     Weakness of right upper extremity      Past Surgical History:   Procedure Laterality Date    ACHILLES TENDON SURGERY Left 2023    left achilles lengthening and total ankle arthroplasty performed by Lauro Oneil III, MD at [facility]    CATARACT REMOVAL Bilateral 2017    CERVICAL FUSION      COLONOSCOPY      HEENT  1988    acoustic neuroma    LUMBAR FUSION  1992    TOTAL COLECTOMY Right 2019    TOTAL HIP ARTHROPLASTY Left 2020    UPPER GASTROINTESTINAL ENDOSCOPY N/A 2023    EGD FOREIGN BODY REMOVAL/ BALLOON DILATION/ BIOPSIES performed by Selvin Ba MD at [facility] ENDOSCOPY     Current Facility-Administered Medications   Medication Dose Route Frequency Provider Last Rate Last Admin    lidocaine 1 % injection 1 mL  1 mL IntraDERmal Once PRN Rolando Fernandez Jr., MD        fentaNYL (SUBLIMAZE) injection 100 mcg  100 mcg IntraVENous PRN Rolando Fernandez Jr., MD        Or    fentaNYL (SUBLIMAZE) injection 50 mcg  50 mcg IntraVENous PRN Rolando Fernandez Jr., MD        lactated ringers IV soln infusion   IntraVENous Continuous Rolando Fernandez Jr.,  mL/hr at 24 0941 New Bag at 24 0941    midazolam PF (VERSED) injection

## 2024-01-22 NOTE — ANESTHESIA POSTPROCEDURE EVALUATION
Department of Anesthesiology  Postprocedure Note    Patient: Octavio Garcia  MRN: [id number]  YOB: 1950  Date of evaluation: 1/22/2024    Procedure Summary       Date: 01/22/24 Room / Location: [facility] MAIN OR  / [facility] MAIN OR    Anesthesia Start: 1012 Anesthesia Stop: 1047    Procedure: PROSTATE BIOPSY FUSION (Rectum) Diagnosis:       Elevated prostate specific antigen (PSA)      (Elevated prostate specific antigen (PSA) [R97.20])    Providers: Jose Manuel Garg DO Responsible Provider: Rolando Fernandez Jr., MD    Anesthesia Type: MAC ASA Status: 2            Anesthesia Type: MAC    Aleksandr Phase I: Aleksandr Score: 7    Aleksandr Phase II: Aleksandr Score: 10    Anesthesia Post Evaluation    No notable events documented.

## 2024-01-22 NOTE — OP NOTE
tolerated the procedure well.  He will follow up in the office in two weeks to review his pathology results.      ROGELIO COMBS DO      [id number]  D:  01/22/2024 11:05  T:  01/22/2024 13:01  JOB #:  [id number]

## 2024-01-22 NOTE — BRIEF OP NOTE
Brief Postoperative Note      Patient: Octavio Garcia  YOB: 1950  MRN: [id number]    Date of Procedure: 1/22/2024    Pre-Op Diagnosis Codes:     * Elevated prostate specific antigen (PSA) [R97.20]    Post-Op Diagnosis: Same       Procedure(s):  PROSTATE BIOPSY FUSION    Surgeon(s):  Rogelio Combs DO    Assistant:  * No surgical staff found *    Anesthesia: Monitor Anesthesia Care    Estimated Blood Loss (mL): <5cc    Complications: none immediate    Specimens:   ID Type Source Tests Collected by Time Destination   A : LLB Tissue Prostate SURGICAL PATHOLOGY [place], Rogelio P, DO 1/22/2024 0959    B : LLM Tissue Prostate SURGICAL PATHOLOGY [place], Rogelio P, DO 1/22/2024 0959    C : LLA Tissue Prostate SURGICAL PATHOLOGY [place], Rogelio P, DO 1/22/2024 0959    D : LB Tissue Prostate SURGICAL PATHOLOGY [place], Rogelio P, DO 1/22/2024 0959    E : LM Tissue Prostate SURGICAL PATHOLOGY [place] Rogelio P, DO 1/22/2024 1000    F : LA Tissue Prostate SURGICAL PATHOLOGY [place], Rogelio P, DO 1/22/2024 1000    G : RB Tissue Prostate SURGICAL PATHOLOGY [place], Rogelio P, DO 1/22/2024 1000    H : RM Tissue Prostate SURGICAL PATHOLOGY [place], Rogelio P, DO 1/22/2024 1000    I : RA Tissue Prostate SURGICAL PATHOLOGY [place], Rogelio P, DO 1/22/2024 1000    J : RLB Tissue Prostate SURGICAL PATHOLOGY [place], Rogelio P, DO 1/22/2024 1000    K : RLM Tissue Prostate SURGICAL PATHOLOGY [place], Rogelio P, DO 1/22/2024 1000    L : RLA Tissue Prostate SURGICAL PATHOLOGY [place], Rogelio P, DO 1/22/2024 1000    M : LOUIE - LA Tissue Prostate SURGICAL PATHOLOGY [place] Rogelio P, DO 1/22/2024 1031        Implants:  * No implants in log *      Drains: * No LDAs found *    Findings: see op  note      Electronically signed by ROGELIO COMBS DO on 1/22/2024 at 10:59 AM

## 2024-01-31 ENCOUNTER — OFFICE VISIT (OUTPATIENT)
Dept: ORTHOPEDIC SURGERY | Age: 74
End: 2024-01-31
Payer: MEDICARE

## 2024-01-31 DIAGNOSIS — M19.072 PRIMARY OSTEOARTHRITIS, LEFT ANKLE AND FOOT: Primary | ICD-10-CM

## 2024-01-31 PROCEDURE — G8428 CUR MEDS NOT DOCUMENT: HCPCS | Performed by: NURSE PRACTITIONER

## 2024-01-31 PROCEDURE — G8484 FLU IMMUNIZE NO ADMIN: HCPCS | Performed by: NURSE PRACTITIONER

## 2024-01-31 PROCEDURE — 1123F ACP DISCUSS/DSCN MKR DOCD: CPT | Performed by: NURSE PRACTITIONER

## 2024-01-31 PROCEDURE — G8417 CALC BMI ABV UP PARAM F/U: HCPCS | Performed by: NURSE PRACTITIONER

## 2024-01-31 PROCEDURE — 99213 OFFICE O/P EST LOW 20 MIN: CPT | Performed by: NURSE PRACTITIONER

## 2024-01-31 PROCEDURE — 1036F TOBACCO NON-USER: CPT | Performed by: NURSE PRACTITIONER

## 2024-01-31 PROCEDURE — 3017F COLORECTAL CA SCREEN DOC REV: CPT | Performed by: NURSE PRACTITIONER

## 2024-01-31 NOTE — PROGRESS NOTES
Name: Octavio Garcia  YOB: 1950  Gender: male  MRN: [id number]    Procedure Performed:  Left Total ankle arthroplasty  Left gastrocnemius recession  Left midfoot capsulotomy with extensor tendon lengthening  Left prophylactic fixation of tibia           Date of Procedure: 08/08/2023      Subjective: Patient reports he is done really well since his last visit.  He seems pleased overall with the progress that he is made and the range of motion that he has to the ankle joint.  He does note that about a month ago he slept wrong and woke up with left hip and ankle pain but it is since has resolved.      Physical Examination: Anterior ankle incision is well-healed.  Swelling is minimal to none today.  He has palpable pulses and intact sensation to the foot.  There is still some mild discoloration to the foot today.  Patient is actively able to perform range of motion to the ankle joint without difficulty or pain.        Imaging:   Interpretation of imaging  Left ankle XR: AP, Lateral, Oblique views     ICD-10-CM    1. Primary osteoarthritis, left ankle and foot  M19.072 XR ANKLE LEFT (MIN 3 VIEWS)         Report: AP, lateral, oblique x-ray of the left ankle demonstrates well-seated implants without hardware failure    Impression: Well-seated implants without hardware failure   Destini Cortés, APRN - CNP           Assessment:   Status post left total ankle arthroplasty with prophylactic fixation of the tibia.      Plan:   3 This is stable chronic illness/condition  Treatment at this time: Time with no intervention he will follow-up for an annual visit in August with ankle x-rays and at this time he would like to discuss potentially the same surgery to the right ankle.  Studies ordered: Ankle XR needed @ Next Visit    Weight-bearing status: WBAT        Return to work/work restrictions: none  No medications given

## 2024-02-05 ENCOUNTER — OFFICE VISIT (OUTPATIENT)
Dept: UROLOGY | Age: 74
End: 2024-02-05
Payer: MEDICARE

## 2024-02-05 ENCOUNTER — TELEPHONE (OUTPATIENT)
Dept: UROLOGY | Age: 74
End: 2024-02-05

## 2024-02-05 DIAGNOSIS — C61 PROSTATE CANCER (HCC): Primary | ICD-10-CM

## 2024-02-05 DIAGNOSIS — C61 MALIGNANT NEOPLASM OF PROSTATE (HCC): Primary | ICD-10-CM

## 2024-02-05 PROCEDURE — G8484 FLU IMMUNIZE NO ADMIN: HCPCS | Performed by: UROLOGY

## 2024-02-05 PROCEDURE — G8427 DOCREV CUR MEDS BY ELIG CLIN: HCPCS | Performed by: UROLOGY

## 2024-02-05 PROCEDURE — 3017F COLORECTAL CA SCREEN DOC REV: CPT | Performed by: UROLOGY

## 2024-02-05 PROCEDURE — 1036F TOBACCO NON-USER: CPT | Performed by: UROLOGY

## 2024-02-05 PROCEDURE — 1123F ACP DISCUSS/DSCN MKR DOCD: CPT | Performed by: UROLOGY

## 2024-02-05 PROCEDURE — 99214 OFFICE O/P EST MOD 30 MIN: CPT | Performed by: UROLOGY

## 2024-02-05 PROCEDURE — G8417 CALC BMI ABV UP PARAM F/U: HCPCS | Performed by: UROLOGY

## 2024-02-05 NOTE — PROGRESS NOTES
[facility]  [address] [place] [address]  [phone]    Octavio Garcia  : 1950     HPI   73 y.o., male returns in follow up for CaP.  PSA was 9.7 on 23. MRI on 11/10/23 shows a small ind LB lesion.   Fusion biopsies completed on 24.  Path showed Tony 6 in 11/13 areas.  Reports ED.  Prior R robotic hemicolectomy with Dr. Che in 2019.  Reports recent episodes of diarrhea.  No other prior abd surgery.      Past Medical History:   Diagnosis Date    Anemia     per patient reports hx of 30 yrs ago    Anxiety     Arthritis     OA    Chronic pain     headaches    Cigar smoker     history     COVID-2020    not hospitalized    Dysphagia     GERD (gastroesophageal reflux disease)     controlled with medication    History of colon cancer     Major depressive disorder     managed with medication    Malignant neoplasm of ascending colon (HCC)     PUD (peptic ulcer disease) 1979    Rectal hemorrhage     Sinus tachycardia     Weakness of right upper extremity      Past Surgical History:   Procedure Laterality Date    ACHILLES TENDON SURGERY Left 2023    left achilles lengthening and total ankle arthroplasty performed by Lauro Oneil III, MD at [facility]    CATARACT REMOVAL Bilateral 2017    CERVICAL FUSION  2013    COLONOSCOPY      HEENT      acoustic neuroma    LUMBAR FUSION      PROSTATE BIOPSY N/A 2024    PROSTATE BIOPSY FUSION performed by Jose Manuel Garg DO at [facility] MAIN OR    TOTAL COLECTOMY Right 2019    TOTAL HIP ARTHROPLASTY Left 2020    UPPER GASTROINTESTINAL ENDOSCOPY N/A 2023    EGD FOREIGN BODY REMOVAL/ BALLOON DILATION/ BIOPSIES performed by Selvin Ba MD at [facility] ENDOSCOPY     Current Outpatient Medications   Medication Sig Dispense Refill    aspirin 81 MG EC tablet Take 1 tablet by mouth in the morning and at bedtime (Patient taking differently: Take 1 tablet by mouth daily preventative only-pt denies heart attack, stroke,

## 2024-02-05 NOTE — TELEPHONE ENCOUNTER
----- Message from Jose Manuel Garg DO sent at 2/5/2024  1:10 PM EST -----  Regarding: surg  RALP  Not before March.

## 2024-02-06 PROBLEM — C61 MALIGNANT NEOPLASM OF PROSTATE (HCC): Status: ACTIVE | Noted: 2024-02-05

## 2024-02-06 NOTE — TELEPHONE ENCOUNTER
Procedures: Procedure(s):   PROSTATECTOMY LAPAROSCOPIC ROBOTIC/POSSIBLE BILATERAL LYMPH NODE DISSECTION   Date: 3/21/2024   Time: 1027   Location: [facility] MAIN OR 11

## 2024-02-27 NOTE — PROGRESS NOTES
Octavio Oziel Garcia  : 1950  Primary: Medicare Part A And B (Medicare)  Secondary: [facility] [employer] MED[employer]  [address]  [address]  [facility] [address]  Phone: [phone]  Fax: [phone] Plan Frequency: one time a week for 90 days 3 weeks following surgery  Plan of Care/Certification Expiration Date: 24        Plan of Care/Certification Expiration Date:  Plan of Care/Certification Expiration Date: 24    Frequency/Duration: Plan Frequency: one time a week for 90 days 3 weeks following surgery      Time In/Out:   Time In: 1000  Time Out: 1045      PT Visit Info:         Visit Count:  1    OUTPATIENT PHYSICAL THERAPY:   Treatment Note 2024       Episode  (Prostate Cancer)               Treatment Diagnosis:     Other lack of coordination  Muscle weakness (generalized)  Contributing Diagnosis:  Malignant neoplasm of prostate (C61) and Nocturia (R35.1)  Medical/Referring Diagnosis:    Malignant neoplasm of prostate (HCC) [C61]    Referring Physician:  Jose Manuel Garg DO MD Orders:  PT Eval and Treat   Return MD Appt:  -   Date of Onset:  Onset Date: 24     Allergies:   Patient has no known allergies.  Restrictions/Precautions:   None      Interventions Planned (Treatment may consist of any combination of the following):     See Assessment Note    Subjective Comments:   See evaluation  Initial Pain Level::     0/10  Post Session Pain Level:        /10  Medications Last Reviewed:  2024  Updated Objective Findings:  See Evaluation Note from today  Treatment   THERAPEUTIC EXERCISE: ( minutes):    Exercises per grid below to improve mobility, strength, and coordination.  Required minimal visual, verbal, manual, and tactile cues to promote proper body alignment, promote proper body posture, promote proper body mechanics, and promote proper body breathing techniques.  Progressed resistance, range, repetitions, and complexity of movement as

## 2024-02-27 NOTE — THERAPY EVALUATION
have any barriers to learning?: No barriers      Fall Risk Scale:   Cantrell Total Score: 30          OBJECTIVE   External Observation:  Voluntary contraction:  [] absent     [x] present  Voluntary relaxation:  [] absent     [x] present  Involuntary contraction: [] absent     [x] present  Involuntary relaxation: [] absent     [x] present  Postural assessment: Forward Head Posture, Rounded Shoulders, and Trunk Shift  Gait: Impaired - uses SPC        Contraction Ability:  Voluntary contraction: [] absent     [x] weak     [] moderate      [] strong  Voluntary relaxation: [] absent     [] partial   [x] complete   [] delayed    [] incomplete    Overflow: [] absent     [] min     [x] mod     [] severe / Compensatory mm groups include: abdominals     ASSESSMENT   Initial Assessment: Octavio Garcia presents decreased coordination, strength and endurance of pelvic floor muscle pre operatively. Today he was educated on bladder health, kegel exercises, pelvic floor anatomy and role of musculature and precautions with catheter post operatively. He required verbal and tactile cuing for proper activation and isolation of pelvic floor muscles.  He was given kegel exercises to do at home prior to surgery and once catheter is removed. He was educated on pain after surgery and precautions with kegel exercises. He was able to demonstrate improved coordination by the end of the session today. He will continue to benefit from skilled physical therapy to address above mentioned deficits and restore normal PFM function post operatively to minimize urinary incontinence.    Therapy Problem List: (Impacting functional limitations):    Decreased Strength, Decreased ROM, Decreased Functional Mobility, Decreased [place] with Home Exercise Program, Decreased Posture, Decreased Body Mechanics, and Decreased Activity Tolerance/Endurance*   Therapy Prognosis:   Good     Initial Assessment Complexity:   Low Complexity     PLAN   Effective  Dates: 2/29/2024 TO Plan of Care/Certification Expiration Date: 05/29/24     Frequency/Duration: Plan Frequency: one time a week for 90 days 3 weeks following surgery     Interventions Planned (Treatment may consist of any combination of the following):    Home Exercise Program (HEP), Manual Therapy, Neuromuscular Re-education/Strengthening, Therapeutic Activites, and Therapeutic Exercise/Strengthening     Goals: (Goals have been discussed and agreed upon with patient.)  Short-Term Functional Goals: Time Frame: 4 weeks  Patient will demonstrate I with basic PFM HEP to improve awareness, coordination, and timing of PFM.  Patient will verbalize an understanding of pelvic anatomy and causes of post op incontinence.  Patient will demonstrate understanding of and ability to teach back appropriate water intake, bladder irritants, toileting frequency, and positioning for improved self-management of symptoms.        Discharge Goals:   DISCHARGE GOALS TO BE DETERMINED POST OPERATIVELY            Outcome Measure:   Expanded Prostate Cancer Index Composite for Clinical Practice (EPIC-CP)  Score:  Initial:   Urinary Incontinence symptom score: 0/12  Urinary Irritation/Obstruction symptoms score: 3/12  Bowel symptoms score: 9/12  Sexual symptoms score: 3/12  Vitality/hormonal symptoms score: 4/12  Overall Prostate Cancer QOL score: 19/60 Most Recent: X (Date: -- )   Interpretation of Score:  This survey asks questions concerning certain urinary, bowel and sexual symptoms. Each question is scored on a 0-4 scale, 4 representing the greatest disability. There are 5 sub-scores, each out of 12 and a total overall symptom score out of 60. The higher the score, suggests a higher disability.    Medical Necessity:   > Patient demonstrates good rehab potential due to higher previous functional level.  Reason For Services/Other Comments:  > Patient continues to require skilled intervention due to above mentioned deficits.    Regarding

## 2024-02-29 ENCOUNTER — HOSPITAL ENCOUNTER (OUTPATIENT)
Dept: PHYSICAL THERAPY | Age: 74
Setting detail: RECURRING SERIES
End: 2024-02-29
Attending: UROLOGY
Payer: MEDICARE

## 2024-02-29 DIAGNOSIS — R27.8 OTHER LACK OF COORDINATION: Primary | ICD-10-CM

## 2024-02-29 DIAGNOSIS — M62.81 MUSCLE WEAKNESS (GENERALIZED): ICD-10-CM

## 2024-02-29 PROCEDURE — 97161 PT EVAL LOW COMPLEX 20 MIN: CPT

## 2024-02-29 PROCEDURE — 97530 THERAPEUTIC ACTIVITIES: CPT

## 2024-02-29 ASSESSMENT — PAIN SCALES - GENERAL: PAINLEVEL_OUTOF10: 0

## 2024-03-14 ENCOUNTER — HOSPITAL ENCOUNTER (OUTPATIENT)
Dept: SURGERY | Age: 74
Discharge: HOME | End: 2024-03-14
Payer: MEDICARE

## 2024-03-14 ENCOUNTER — OFFICE VISIT (OUTPATIENT)
Dept: UROLOGY | Age: 74
End: 2024-03-14
Payer: MEDICARE

## 2024-03-14 ENCOUNTER — HOSPITAL ENCOUNTER (OUTPATIENT)
Dept: GENERAL RADIOLOGY | Age: 74
End: 2024-03-14
Payer: MEDICARE

## 2024-03-14 VITALS
BODY MASS INDEX: 31.19 KG/M2 | DIASTOLIC BLOOD PRESSURE: 83 MMHG | TEMPERATURE: 97.7 F | OXYGEN SATURATION: 93 % | HEART RATE: 66 BPM | WEIGHT: 198.7 LBS | HEIGHT: 67 IN | RESPIRATION RATE: 18 BRPM | SYSTOLIC BLOOD PRESSURE: 172 MMHG

## 2024-03-14 DIAGNOSIS — C61 PROSTATE CANCER (HCC): ICD-10-CM

## 2024-03-14 DIAGNOSIS — C61 PROSTATE CANCER (HCC): Primary | ICD-10-CM

## 2024-03-14 LAB
ANION GAP SERPL CALC-SCNC: 3 MMOL/L (ref 2–11)
APPEARANCE UR: CLEAR
APTT PPP: 25.6 SEC (ref 23.3–37.4)
BILIRUB UR QL: NEGATIVE
BUN SERPL-MCNC: 16 MG/DL (ref 8–23)
CALCIUM SERPL-MCNC: 8.8 MG/DL (ref 8.3–10.4)
CHLORIDE SERPL-SCNC: 109 MMOL/L (ref 103–113)
CO2 SERPL-SCNC: 29 MMOL/L (ref 21–32)
COLOR UR: NORMAL
CREAT SERPL-MCNC: 1.1 MG/DL (ref 0.8–1.5)
EKG ATRIAL RATE: 59 BPM
EKG DIAGNOSIS: NORMAL
EKG P AXIS: 22 DEGREES
EKG P-R INTERVAL: 170 MS
EKG Q-T INTERVAL: 416 MS
EKG QRS DURATION: 90 MS
EKG QTC CALCULATION (BAZETT): 411 MS
EKG R AXIS: -12 DEGREES
EKG T AXIS: 17 DEGREES
EKG VENTRICULAR RATE: 59 BPM
ERYTHROCYTE [DISTWIDTH] IN BLOOD BY AUTOMATED COUNT: 15.2 % (ref 11.9–14.6)
GLUCOSE SERPL-MCNC: 123 MG/DL (ref 65–100)
GLUCOSE UR STRIP.AUTO-MCNC: NEGATIVE MG/DL
HCT VFR BLD AUTO: 40 % (ref 41.1–50.3)
HGB BLD-MCNC: 13.3 G/DL (ref 13.6–17.2)
HGB UR QL STRIP: NEGATIVE
INR PPP: 1
KETONES UR QL STRIP.AUTO: NEGATIVE MG/DL
LEUKOCYTE ESTERASE UR QL STRIP.AUTO: NEGATIVE
MCH RBC QN AUTO: 28 PG (ref 26.1–32.9)
MCHC RBC AUTO-ENTMCNC: 33.3 G/DL (ref 31.4–35)
MCV RBC AUTO: 84.2 FL (ref 82–102)
NITRITE UR QL STRIP.AUTO: NEGATIVE
NRBC # BLD: 0 K/UL (ref 0–0.2)
PH UR STRIP: 6.5 (ref 5–9)
PLATELET # BLD AUTO: 124 K/UL (ref 150–450)
PMV BLD AUTO: 8.5 FL (ref 9.4–12.3)
POTASSIUM SERPL-SCNC: 4.2 MMOL/L (ref 3.5–5.1)
PROT UR STRIP-MCNC: NEGATIVE MG/DL
PROTHROMBIN TIME: 13.6 SEC (ref 11.3–14.9)
RBC # BLD AUTO: 4.75 M/UL (ref 4.23–5.6)
SODIUM SERPL-SCNC: 141 MMOL/L (ref 136–146)
SP GR UR REFRACTOMETRY: 1.02 (ref 1–1.02)
UROBILINOGEN UR QL STRIP.AUTO: 1 EU/DL (ref 0.2–1)
WBC # BLD AUTO: 4.8 K/UL (ref 4.3–11.1)

## 2024-03-14 PROCEDURE — 1036F TOBACCO NON-USER: CPT | Performed by: UROLOGY

## 2024-03-14 PROCEDURE — 87086 URINE CULTURE/COLONY COUNT: CPT

## 2024-03-14 PROCEDURE — 93010 ELECTROCARDIOGRAM REPORT: CPT | Performed by: INTERNAL MEDICINE

## 2024-03-14 PROCEDURE — 80048 BASIC METABOLIC PNL TOTAL CA: CPT

## 2024-03-14 PROCEDURE — 99214 OFFICE O/P EST MOD 30 MIN: CPT | Performed by: UROLOGY

## 2024-03-14 PROCEDURE — G8427 DOCREV CUR MEDS BY ELIG CLIN: HCPCS | Performed by: UROLOGY

## 2024-03-14 PROCEDURE — 1123F ACP DISCUSS/DSCN MKR DOCD: CPT | Performed by: UROLOGY

## 2024-03-14 PROCEDURE — G8484 FLU IMMUNIZE NO ADMIN: HCPCS | Performed by: UROLOGY

## 2024-03-14 PROCEDURE — 81003 URINALYSIS AUTO W/O SCOPE: CPT

## 2024-03-14 PROCEDURE — 3017F COLORECTAL CA SCREEN DOC REV: CPT | Performed by: UROLOGY

## 2024-03-14 PROCEDURE — 85610 PROTHROMBIN TIME: CPT

## 2024-03-14 PROCEDURE — 93005 ELECTROCARDIOGRAM TRACING: CPT

## 2024-03-14 PROCEDURE — G8417 CALC BMI ABV UP PARAM F/U: HCPCS | Performed by: UROLOGY

## 2024-03-14 PROCEDURE — 71046 X-RAY EXAM CHEST 2 VIEWS: CPT

## 2024-03-14 PROCEDURE — 85730 THROMBOPLASTIN TIME PARTIAL: CPT

## 2024-03-14 PROCEDURE — 85027 COMPLETE CBC AUTOMATED: CPT

## 2024-03-14 ASSESSMENT — PAIN DESCRIPTION - LOCATION: LOCATION: GENERALIZED

## 2024-03-14 ASSESSMENT — PAIN SCALES - GENERAL: PAINLEVEL_OUTOF10: 4

## 2024-03-14 NOTE — PROGRESS NOTES
Hypertension Mother     Heart Attack Father     Hypertension Brother        Review of Systems  All systems reviewed and are negative at this time.    Physical Exam  There were no vitals taken for this visit.  General appearance - alert, well appearing, and in no distress  Mental status - alert and oriented  Eyes - extraocular eye movements intact, sclera anicteric  Nose - normal and patent, no erythema or discharge  Mouth - mucous membranes moist  Chest - clear to auscultation bilaterally  Heart - normal rate, regular rhythm  Abdomen - soft, nontender, nondistended, no masses or organomegaly  Neurological - normal speech, no focal findings or movement disorder noted  Skin - normal coloration and turgor      Assessment/Plan    ICD-10-CM    1. Prostate cancer (HCC)  C61         He has elected to proceed with a RALP.  We discussed pros/cons of PLND and he elected to proceed without PLND.  All risks, benefits and alternatives to the above mentioned procedure were discussed and the patient is willing to proceed at this time.    ROGELIO COMBS DO    Total time for today's encounter including chart review, result review, documentation and face to face encounter was 31 minutes.

## 2024-03-14 NOTE — PERIOP NOTE
PLEASE CONTINUE TAKING ALL PRESCRIPTION MEDICATIONS UP TO THE DAY OF SURGERY UNLESS OTHERWISE DIRECTED BELOW. You may take Tylenol, allergy,  and/or indigestion medications.     TAKE ONLY THESE MEDICATIONS ON THE DAY OF SURGERY    gabapentin (Neurontin)     Omeprazole (Prilosec)          DISCONTINUE all vitamins and supplements 7 days prior to surgery. DISCONTINUE Non-Steroidal Anti-Inflammatory (NSAIDS) such as Advil and Aleve 5 days prior to surgery.     Home Medications to Hold- please continue all other medications except these.    Hold Aspirin 81 mg  for 5 days prior to surgery        Comments      On the day before surgery please take 2 Tylenol in the morning and then again before bed. You may use either regular or extra strength.           Please do not bring home medications with you on the day of surgery unless otherwise directed by your nurse.  If you are instructed to bring home medications, please give them to your nurse as they will be administered by the nursing staff.    If you have any questions, please call [facility] [phone].    A copy of this note was provided to the patient for reference.

## 2024-03-16 LAB
BACTERIA SPEC CULT: NORMAL
SERVICE CMNT-IMP: NORMAL

## 2024-03-21 ENCOUNTER — ANESTHESIA (OUTPATIENT)
Dept: SURGERY | Age: 74
End: 2024-03-21
Payer: MEDICARE

## 2024-03-21 ENCOUNTER — HOSPITAL ENCOUNTER (OUTPATIENT)
Age: 74
Setting detail: OBSERVATION
Discharge: HOME | End: 2024-03-22
Attending: UROLOGY | Admitting: UROLOGY
Payer: MEDICARE

## 2024-03-21 ENCOUNTER — ANESTHESIA EVENT (OUTPATIENT)
Dept: SURGERY | Age: 74
End: 2024-03-21
Payer: MEDICARE

## 2024-03-21 ENCOUNTER — APPOINTMENT (OUTPATIENT)
Dept: GENERAL RADIOLOGY | Age: 74
End: 2024-03-21
Attending: UROLOGY
Payer: MEDICARE

## 2024-03-21 DIAGNOSIS — C61 PROSTATE CANCER (HCC): Primary | ICD-10-CM

## 2024-03-21 LAB
ABO + RH BLD: NORMAL
ANION GAP BLD CALC-SCNC: ABNORMAL MMOL/L
ARTERIAL PATENCY WRIST A: POSITIVE
BASE DEFICIT BLD-SCNC: 0.9 MMOL/L
BDY SITE: ABNORMAL
BLOOD GROUP ANTIBODIES SERPL: NORMAL
CA-I BLD-MCNC: 1.17 MMOL/L (ref 1.12–1.32)
CO2 BLD-SCNC: 25 MMOL/L (ref 13–23)
GAS FLOW.O2 O2 DELIVERY SYS: ABNORMAL
GLUCOSE BLD STRIP.AUTO-MCNC: 177 MG/DL (ref 65–100)
HCO3 BLD-SCNC: 25.2 MMOL/L (ref 22–26)
HCT VFR BLD AUTO: 41.3 % (ref 41.1–50.3)
HGB BLD-MCNC: 13.6 G/DL (ref 13.6–17.2)
PCO2 BLD: 46.4 MMHG (ref 35–45)
PH BLD: 7.34 (ref 7.35–7.45)
PO2 BLD: 65 MMHG (ref 75–100)
POC FIO2: 5
POTASSIUM BLD-SCNC: 4.7 MMOL/L (ref 3.5–5.1)
SAO2 % BLD: 91 %
SERVICE CMNT-IMP: ABNORMAL
SODIUM BLD-SCNC: 138 MMOL/L (ref 136–145)
SPECIMEN EXP DATE BLD: NORMAL
SPECIMEN SITE: ABNORMAL

## 2024-03-21 PROCEDURE — 7100000001 HC PACU RECOVERY - ADDTL 15 MIN: Performed by: UROLOGY

## 2024-03-21 PROCEDURE — 86900 BLOOD TYPING SEROLOGIC ABO: CPT

## 2024-03-21 PROCEDURE — 2580000003 HC RX 258: Performed by: UROLOGY

## 2024-03-21 PROCEDURE — 2580000003 HC RX 258

## 2024-03-21 PROCEDURE — 6370000000 HC RX 637 (ALT 250 FOR IP): Performed by: ANESTHESIOLOGY

## 2024-03-21 PROCEDURE — 2500000003 HC RX 250 WO HCPCS

## 2024-03-21 PROCEDURE — 3600000009 HC SURGERY ROBOT BASE: Performed by: UROLOGY

## 2024-03-21 PROCEDURE — 82330 ASSAY OF CALCIUM: CPT

## 2024-03-21 PROCEDURE — 6360000002 HC RX W HCPCS: Performed by: UROLOGY

## 2024-03-21 PROCEDURE — 2580000003 HC RX 258: Performed by: ANESTHESIOLOGY

## 2024-03-21 PROCEDURE — 82947 ASSAY GLUCOSE BLOOD QUANT: CPT

## 2024-03-21 PROCEDURE — S2900 ROBOTIC SURGICAL SYSTEM: HCPCS | Performed by: UROLOGY

## 2024-03-21 PROCEDURE — G0378 HOSPITAL OBSERVATION PER HR: HCPCS

## 2024-03-21 PROCEDURE — 86850 RBC ANTIBODY SCREEN: CPT

## 2024-03-21 PROCEDURE — 6370000000 HC RX 637 (ALT 250 FOR IP): Performed by: UROLOGY

## 2024-03-21 PROCEDURE — 86901 BLOOD TYPING SEROLOGIC RH(D): CPT

## 2024-03-21 PROCEDURE — 2700000000 HC OXYGEN THERAPY PER DAY

## 2024-03-21 PROCEDURE — 6360000002 HC RX W HCPCS

## 2024-03-21 PROCEDURE — 3600000019 HC SURGERY ROBOT ADDTL 15MIN: Performed by: UROLOGY

## 2024-03-21 PROCEDURE — 71045 X-RAY EXAM CHEST 1 VIEW: CPT

## 2024-03-21 PROCEDURE — 85014 HEMATOCRIT: CPT

## 2024-03-21 PROCEDURE — 85018 HEMOGLOBIN: CPT

## 2024-03-21 PROCEDURE — 88309 TISSUE EXAM BY PATHOLOGIST: CPT

## 2024-03-21 PROCEDURE — 3700000000 HC ANESTHESIA ATTENDED CARE: Performed by: UROLOGY

## 2024-03-21 PROCEDURE — 6360000002 HC RX W HCPCS: Performed by: ANESTHESIOLOGY

## 2024-03-21 PROCEDURE — 84295 ASSAY OF SERUM SODIUM: CPT

## 2024-03-21 PROCEDURE — 55866 LAPS SURG PRST8ECT RPBIC RAD: CPT | Performed by: UROLOGY

## 2024-03-21 PROCEDURE — 84132 ASSAY OF SERUM POTASSIUM: CPT

## 2024-03-21 PROCEDURE — 7100000000 HC PACU RECOVERY - FIRST 15 MIN: Performed by: UROLOGY

## 2024-03-21 PROCEDURE — 2709999900 HC NON-CHARGEABLE SUPPLY: Performed by: UROLOGY

## 2024-03-21 PROCEDURE — 82803 BLOOD GASES ANY COMBINATION: CPT

## 2024-03-21 PROCEDURE — 94760 N-INVAS EAR/PLS OXIMETRY 1: CPT

## 2024-03-21 PROCEDURE — 3700000001 HC ADD 15 MINUTES (ANESTHESIA): Performed by: UROLOGY

## 2024-03-21 RX ORDER — OXYCODONE HYDROCHLORIDE 5 MG/1
5 TABLET ORAL PRN
Status: DISCONTINUED | OUTPATIENT
Start: 2024-03-21 | End: 2024-03-21 | Stop reason: HOSPADM

## 2024-03-21 RX ORDER — IPRATROPIUM BROMIDE AND ALBUTEROL SULFATE 2.5; .5 MG/3ML; MG/3ML
1 SOLUTION RESPIRATORY (INHALATION) ONCE
Status: COMPLETED | OUTPATIENT
Start: 2024-03-21 | End: 2024-03-21

## 2024-03-21 RX ORDER — SODIUM CHLORIDE, SODIUM LACTATE, POTASSIUM CHLORIDE, CALCIUM CHLORIDE 600; 310; 30; 20 MG/100ML; MG/100ML; MG/100ML; MG/100ML
INJECTION, SOLUTION INTRAVENOUS CONTINUOUS
Status: DISCONTINUED | OUTPATIENT
Start: 2024-03-21 | End: 2024-03-21 | Stop reason: HOSPADM

## 2024-03-21 RX ORDER — ACETAMINOPHEN 500 MG
1000 TABLET ORAL ONCE
Status: COMPLETED | OUTPATIENT
Start: 2024-03-21 | End: 2024-03-21

## 2024-03-21 RX ORDER — HYDROMORPHONE HYDROCHLORIDE 2 MG/ML
INJECTION, SOLUTION INTRAMUSCULAR; INTRAVENOUS; SUBCUTANEOUS PRN
Status: DISCONTINUED | OUTPATIENT
Start: 2024-03-21 | End: 2024-03-21 | Stop reason: SDUPTHER

## 2024-03-21 RX ORDER — OXYCODONE HYDROCHLORIDE 5 MG/1
10 TABLET ORAL PRN
Status: DISCONTINUED | OUTPATIENT
Start: 2024-03-21 | End: 2024-03-21 | Stop reason: HOSPADM

## 2024-03-21 RX ORDER — SODIUM CHLORIDE 0.9 % (FLUSH) 0.9 %
5-40 SYRINGE (ML) INJECTION PRN
Status: DISCONTINUED | OUTPATIENT
Start: 2024-03-21 | End: 2024-03-21 | Stop reason: HOSPADM

## 2024-03-21 RX ORDER — MIDAZOLAM HYDROCHLORIDE 2 MG/2ML
2 INJECTION, SOLUTION INTRAMUSCULAR; INTRAVENOUS
Status: DISCONTINUED | OUTPATIENT
Start: 2024-03-21 | End: 2024-03-21 | Stop reason: HOSPADM

## 2024-03-21 RX ORDER — LIDOCAINE HYDROCHLORIDE 20 MG/ML
INJECTION, SOLUTION EPIDURAL; INFILTRATION; INTRACAUDAL; PERINEURAL PRN
Status: DISCONTINUED | OUTPATIENT
Start: 2024-03-21 | End: 2024-03-21 | Stop reason: SDUPTHER

## 2024-03-21 RX ORDER — SUCCINYLCHOLINE CHLORIDE 20 MG/ML
INJECTION INTRAMUSCULAR; INTRAVENOUS PRN
Status: DISCONTINUED | OUTPATIENT
Start: 2024-03-21 | End: 2024-03-21 | Stop reason: SDUPTHER

## 2024-03-21 RX ORDER — PROPOFOL 10 MG/ML
INJECTION, EMULSION INTRAVENOUS PRN
Status: DISCONTINUED | OUTPATIENT
Start: 2024-03-21 | End: 2024-03-21 | Stop reason: SDUPTHER

## 2024-03-21 RX ORDER — KETAMINE HYDROCHLORIDE 50 MG/ML
INJECTION, SOLUTION INTRAMUSCULAR; INTRAVENOUS PRN
Status: DISCONTINUED | OUTPATIENT
Start: 2024-03-21 | End: 2024-03-21 | Stop reason: SDUPTHER

## 2024-03-21 RX ORDER — DEXAMETHASONE SODIUM PHOSPHATE 4 MG/ML
INJECTION, SOLUTION INTRA-ARTICULAR; INTRALESIONAL; INTRAMUSCULAR; INTRAVENOUS; SOFT TISSUE PRN
Status: DISCONTINUED | OUTPATIENT
Start: 2024-03-21 | End: 2024-03-21 | Stop reason: SDUPTHER

## 2024-03-21 RX ORDER — ONDANSETRON 2 MG/ML
4 INJECTION INTRAMUSCULAR; INTRAVENOUS
Status: DISCONTINUED | OUTPATIENT
Start: 2024-03-21 | End: 2024-03-21 | Stop reason: HOSPADM

## 2024-03-21 RX ORDER — ONDANSETRON 2 MG/ML
INJECTION INTRAMUSCULAR; INTRAVENOUS PRN
Status: DISCONTINUED | OUTPATIENT
Start: 2024-03-21 | End: 2024-03-21 | Stop reason: SDUPTHER

## 2024-03-21 RX ORDER — GABAPENTIN 100 MG/1
100 CAPSULE ORAL DAILY
Status: DISCONTINUED | OUTPATIENT
Start: 2024-03-21 | End: 2024-03-21

## 2024-03-21 RX ORDER — DEXTROSE AND SODIUM CHLORIDE 5; .45 G/100ML; G/100ML
INJECTION, SOLUTION INTRAVENOUS CONTINUOUS
Status: DISCONTINUED | OUTPATIENT
Start: 2024-03-21 | End: 2024-03-22 | Stop reason: HOSPADM

## 2024-03-21 RX ORDER — SERTRALINE HYDROCHLORIDE 100 MG/1
200 TABLET, FILM COATED ORAL
Status: DISCONTINUED | OUTPATIENT
Start: 2024-03-21 | End: 2024-03-22 | Stop reason: HOSPADM

## 2024-03-21 RX ORDER — ROCURONIUM BROMIDE 10 MG/ML
INJECTION, SOLUTION INTRAVENOUS PRN
Status: DISCONTINUED | OUTPATIENT
Start: 2024-03-21 | End: 2024-03-21 | Stop reason: SDUPTHER

## 2024-03-21 RX ORDER — FENTANYL CITRATE 50 UG/ML
100 INJECTION, SOLUTION INTRAMUSCULAR; INTRAVENOUS
Status: DISCONTINUED | OUTPATIENT
Start: 2024-03-21 | End: 2024-03-21 | Stop reason: HOSPADM

## 2024-03-21 RX ORDER — BUPIVACAINE HYDROCHLORIDE 2.5 MG/ML
INJECTION, SOLUTION EPIDURAL; INFILTRATION; INTRACAUDAL PRN
Status: DISCONTINUED | OUTPATIENT
Start: 2024-03-21 | End: 2024-03-21 | Stop reason: ALTCHOICE

## 2024-03-21 RX ORDER — NEOSTIGMINE METHYLSULFATE 1 MG/ML
INJECTION, SOLUTION INTRAVENOUS PRN
Status: DISCONTINUED | OUTPATIENT
Start: 2024-03-21 | End: 2024-03-21 | Stop reason: SDUPTHER

## 2024-03-21 RX ORDER — NALOXONE HYDROCHLORIDE 0.4 MG/ML
INJECTION, SOLUTION INTRAMUSCULAR; INTRAVENOUS; SUBCUTANEOUS PRN
Status: DISCONTINUED | OUTPATIENT
Start: 2024-03-21 | End: 2024-03-21 | Stop reason: HOSPADM

## 2024-03-21 RX ORDER — HYDROMORPHONE HYDROCHLORIDE 2 MG/ML
0.5 INJECTION, SOLUTION INTRAMUSCULAR; INTRAVENOUS; SUBCUTANEOUS EVERY 10 MIN PRN
Status: DISCONTINUED | OUTPATIENT
Start: 2024-03-21 | End: 2024-03-21 | Stop reason: HOSPADM

## 2024-03-21 RX ORDER — HYDROMORPHONE HYDROCHLORIDE 2 MG/ML
0.25 INJECTION, SOLUTION INTRAMUSCULAR; INTRAVENOUS; SUBCUTANEOUS EVERY 5 MIN PRN
Status: DISCONTINUED | OUTPATIENT
Start: 2024-03-21 | End: 2024-03-21 | Stop reason: HOSPADM

## 2024-03-21 RX ORDER — ONDANSETRON 2 MG/ML
4 INJECTION INTRAMUSCULAR; INTRAVENOUS EVERY 6 HOURS PRN
Status: DISCONTINUED | OUTPATIENT
Start: 2024-03-21 | End: 2024-03-22 | Stop reason: HOSPADM

## 2024-03-21 RX ORDER — PANTOPRAZOLE SODIUM 40 MG/1
40 TABLET, DELAYED RELEASE ORAL
Status: DISCONTINUED | OUTPATIENT
Start: 2024-03-22 | End: 2024-03-22 | Stop reason: HOSPADM

## 2024-03-21 RX ORDER — LIDOCAINE HYDROCHLORIDE 10 MG/ML
1 INJECTION, SOLUTION INFILTRATION; PERINEURAL
Status: DISCONTINUED | OUTPATIENT
Start: 2024-03-21 | End: 2024-03-21 | Stop reason: HOSPADM

## 2024-03-21 RX ORDER — DOCUSATE SODIUM 100 MG/1
100 CAPSULE, LIQUID FILLED ORAL 2 TIMES DAILY
Status: DISCONTINUED | OUTPATIENT
Start: 2024-03-21 | End: 2024-03-22 | Stop reason: HOSPADM

## 2024-03-21 RX ORDER — EPHEDRINE SULFATE 5 MG/ML
INJECTION INTRAVENOUS PRN
Status: DISCONTINUED | OUTPATIENT
Start: 2024-03-21 | End: 2024-03-21 | Stop reason: SDUPTHER

## 2024-03-21 RX ORDER — DIPHENHYDRAMINE HYDROCHLORIDE 50 MG/ML
12.5 INJECTION INTRAMUSCULAR; INTRAVENOUS
Status: DISCONTINUED | OUTPATIENT
Start: 2024-03-21 | End: 2024-03-21 | Stop reason: HOSPADM

## 2024-03-21 RX ORDER — SODIUM CHLORIDE 9 MG/ML
INJECTION, SOLUTION INTRAVENOUS PRN
Status: DISCONTINUED | OUTPATIENT
Start: 2024-03-21 | End: 2024-03-21 | Stop reason: HOSPADM

## 2024-03-21 RX ORDER — HYDROCODONE BITARTRATE AND ACETAMINOPHEN 5; 325 MG/1; MG/1
1 TABLET ORAL EVERY 4 HOURS PRN
Status: DISCONTINUED | OUTPATIENT
Start: 2024-03-21 | End: 2024-03-22 | Stop reason: HOSPADM

## 2024-03-21 RX ORDER — MORPHINE SULFATE 2 MG/ML
2 INJECTION, SOLUTION INTRAMUSCULAR; INTRAVENOUS
Status: DISCONTINUED | OUTPATIENT
Start: 2024-03-21 | End: 2024-03-22 | Stop reason: HOSPADM

## 2024-03-21 RX ORDER — GLYCOPYRROLATE 0.2 MG/ML
INJECTION INTRAMUSCULAR; INTRAVENOUS PRN
Status: DISCONTINUED | OUTPATIENT
Start: 2024-03-21 | End: 2024-03-21 | Stop reason: SDUPTHER

## 2024-03-21 RX ORDER — ACETAMINOPHEN 325 MG/1
650 TABLET ORAL EVERY 4 HOURS PRN
Status: DISCONTINUED | OUTPATIENT
Start: 2024-03-21 | End: 2024-03-22 | Stop reason: HOSPADM

## 2024-03-21 RX ORDER — FAMOTIDINE 20 MG/1
20 TABLET, FILM COATED ORAL ONCE
Status: COMPLETED | OUTPATIENT
Start: 2024-03-21 | End: 2024-03-21

## 2024-03-21 RX ORDER — SODIUM CHLORIDE 9 MG/ML
INJECTION, SOLUTION INTRAVENOUS CONTINUOUS
Status: DISCONTINUED | OUTPATIENT
Start: 2024-03-21 | End: 2024-03-21 | Stop reason: HOSPADM

## 2024-03-21 RX ORDER — OXYBUTYNIN CHLORIDE 5 MG/1
5 TABLET ORAL 3 TIMES DAILY PRN
Status: DISCONTINUED | OUTPATIENT
Start: 2024-03-21 | End: 2024-03-22 | Stop reason: HOSPADM

## 2024-03-21 RX ORDER — SODIUM CHLORIDE 0.9 % (FLUSH) 0.9 %
5-40 SYRINGE (ML) INJECTION EVERY 12 HOURS SCHEDULED
Status: DISCONTINUED | OUTPATIENT
Start: 2024-03-21 | End: 2024-03-21 | Stop reason: HOSPADM

## 2024-03-21 RX ADMIN — Medication 200 MG: at 09:37

## 2024-03-21 RX ADMIN — KETAMINE HYDROCHLORIDE 10 MG: 50 INJECTION, SOLUTION INTRAMUSCULAR; INTRAVENOUS at 10:31

## 2024-03-21 RX ADMIN — HYDROCODONE BITARTRATE AND ACETAMINOPHEN 1 TABLET: 5; 325 TABLET ORAL at 20:46

## 2024-03-21 RX ADMIN — SODIUM CHLORIDE, POTASSIUM CHLORIDE, SODIUM LACTATE AND CALCIUM CHLORIDE: 600; 310; 30; 20 INJECTION, SOLUTION INTRAVENOUS at 09:16

## 2024-03-21 RX ADMIN — PHENYLEPHRINE HYDROCHLORIDE 100 MCG: 10 INJECTION INTRAVENOUS at 10:16

## 2024-03-21 RX ADMIN — PHENYLEPHRINE HYDROCHLORIDE 150 MCG: 10 INJECTION INTRAVENOUS at 09:50

## 2024-03-21 RX ADMIN — EPHEDRINE SULFATE 5 MG: 5 INJECTION INTRAVENOUS at 10:38

## 2024-03-21 RX ADMIN — KETAMINE HYDROCHLORIDE 20 MG: 50 INJECTION, SOLUTION INTRAMUSCULAR; INTRAVENOUS at 09:33

## 2024-03-21 RX ADMIN — ONDANSETRON 4 MG: 2 INJECTION INTRAMUSCULAR; INTRAVENOUS at 11:04

## 2024-03-21 RX ADMIN — Medication 2000 MG: at 09:52

## 2024-03-21 RX ADMIN — ROCURONIUM BROMIDE 10 MG: 50 INJECTION, SOLUTION INTRAVENOUS at 10:08

## 2024-03-21 RX ADMIN — FAMOTIDINE 20 MG: 20 TABLET, FILM COATED ORAL at 09:16

## 2024-03-21 RX ADMIN — GLYCOPYRROLATE 0.8 MG: 0.2 INJECTION INTRAMUSCULAR; INTRAVENOUS at 11:10

## 2024-03-21 RX ADMIN — EPHEDRINE SULFATE 5 MG: 5 INJECTION INTRAVENOUS at 10:16

## 2024-03-21 RX ADMIN — HYDROMORPHONE HYDROCHLORIDE 0.2 MG: 2 INJECTION INTRAMUSCULAR; INTRAVENOUS; SUBCUTANEOUS at 10:31

## 2024-03-21 RX ADMIN — LIDOCAINE HYDROCHLORIDE 60 MG: 20 INJECTION, SOLUTION EPIDURAL; INFILTRATION; INTRACAUDAL; PERINEURAL at 09:33

## 2024-03-21 RX ADMIN — Medication 5 MG: at 11:10

## 2024-03-21 RX ADMIN — DOCUSATE SODIUM 100 MG: 100 CAPSULE, LIQUID FILLED ORAL at 20:46

## 2024-03-21 RX ADMIN — DEXAMETHASONE SODIUM PHOSPHATE 4 MG: 4 INJECTION, SOLUTION INTRAMUSCULAR; INTRAVENOUS at 09:53

## 2024-03-21 RX ADMIN — ROCURONIUM BROMIDE 40 MG: 50 INJECTION, SOLUTION INTRAVENOUS at 09:45

## 2024-03-21 RX ADMIN — ACETAMINOPHEN 1000 MG: 500 TABLET, FILM COATED ORAL at 09:16

## 2024-03-21 RX ADMIN — DEXTROSE AND SODIUM CHLORIDE: 5; 450 INJECTION, SOLUTION INTRAVENOUS at 20:46

## 2024-03-21 RX ADMIN — HYDROMORPHONE HYDROCHLORIDE 0.5 MG: 2 INJECTION, SOLUTION INTRAMUSCULAR; INTRAVENOUS; SUBCUTANEOUS at 14:02

## 2024-03-21 RX ADMIN — CEFAZOLIN SODIUM 2000 MG: 100 INJECTION, POWDER, LYOPHILIZED, FOR SOLUTION INTRAVENOUS at 20:48

## 2024-03-21 RX ADMIN — HYDROMORPHONE HYDROCHLORIDE 0.4 MG: 2 INJECTION INTRAMUSCULAR; INTRAVENOUS; SUBCUTANEOUS at 09:22

## 2024-03-21 RX ADMIN — IPRATROPIUM BROMIDE AND ALBUTEROL SULFATE 1 DOSE: .5; 3 SOLUTION RESPIRATORY (INHALATION) at 17:20

## 2024-03-21 RX ADMIN — SERTRALINE 200 MG: 100 TABLET, FILM COATED ORAL at 22:51

## 2024-03-21 RX ADMIN — HYDROMORPHONE HYDROCHLORIDE 0.5 MG: 2 INJECTION, SOLUTION INTRAMUSCULAR; INTRAVENOUS; SUBCUTANEOUS at 12:20

## 2024-03-21 RX ADMIN — PHENYLEPHRINE HYDROCHLORIDE 100 MCG: 10 INJECTION INTRAVENOUS at 11:02

## 2024-03-21 RX ADMIN — PROPOFOL 50 MG: 10 INJECTION, EMULSION INTRAVENOUS at 09:36

## 2024-03-21 RX ADMIN — KETAMINE HYDROCHLORIDE 10 MG: 50 INJECTION, SOLUTION INTRAMUSCULAR; INTRAVENOUS at 11:06

## 2024-03-21 RX ADMIN — EPHEDRINE SULFATE 5 MG: 5 INJECTION INTRAVENOUS at 10:14

## 2024-03-21 RX ADMIN — HYDROMORPHONE HYDROCHLORIDE 0.2 MG: 2 INJECTION INTRAMUSCULAR; INTRAVENOUS; SUBCUTANEOUS at 11:13

## 2024-03-21 RX ADMIN — EPHEDRINE SULFATE 10 MG: 5 INJECTION INTRAVENOUS at 09:48

## 2024-03-21 RX ADMIN — PROPOFOL 200 MG: 10 INJECTION, EMULSION INTRAVENOUS at 09:33

## 2024-03-21 ASSESSMENT — PAIN SCALES - GENERAL
PAINLEVEL_OUTOF10: 7
PAINLEVEL_OUTOF10: 0
PAINLEVEL_OUTOF10: 5

## 2024-03-21 ASSESSMENT — PAIN DESCRIPTION - LOCATION
LOCATION: FLANK
LOCATION: ABDOMEN
LOCATION: PENIS

## 2024-03-21 ASSESSMENT — PAIN DESCRIPTION - DESCRIPTORS
DESCRIPTORS: ACHING
DESCRIPTORS: SHARP

## 2024-03-21 ASSESSMENT — LIFESTYLE VARIABLES: SMOKING_STATUS: 1

## 2024-03-21 ASSESSMENT — PAIN - FUNCTIONAL ASSESSMENT: PAIN_FUNCTIONAL_ASSESSMENT: 0-10

## 2024-03-21 ASSESSMENT — PAIN DESCRIPTION - ORIENTATION: ORIENTATION: RIGHT

## 2024-03-21 NOTE — PROGRESS NOTES
4 Eyes Skin Assessment     NAME:  Octavio Garcia  YOB: 1950  MEDICAL RECORD NUMBER:  [id number]    The patient is being assessed for  Admission    I agree that at least one RN has performed a thorough Head to Toe Skin Assessment on the patient. ALL assessment sites listed below have been assessed.      Areas assessed by both nurses:    Sacrum. Buttock, Coccyx, Ischium        Does the Patient have a Wound? No noted wound(s)       Michael Prevention initiated by RN: Yes  Wound Care Orders initiated by RN: No    Pressure Injury (Stage 3,4, Unstageable, DTI, NWPT, and Complex wounds) if present, place Wound referral order by RN under : No    New Ostomies, if present place, Ostomy referral order under : No     Nurse 1 eSignature: Electronically signed by Shelbie Buenrostro RN on 3/21/24 at 7:09 PM EDT    **SHARE this note so that the co-signing nurse can place an eSignature**    Nurse 2 eSignature: Electronically signed by Elisa Odonnell RN on 3/21/24 at 7:30 PM EDT

## 2024-03-21 NOTE — BRIEF OP NOTE
Brief Postoperative Note      Patient: Octavio Garcia  YOB: 1950  MRN: [id number]    Date of Procedure: 3/21/2024    Pre-Op Diagnosis Codes:     * Malignant neoplasm of prostate (HCC) [C61]    Post-Op Diagnosis: Same       Procedure:  RALP    Surgeon(s):  Rogelio Combs DO    Assistant:  * No surgical staff found *    Anesthesia: General    Estimated Blood Loss (mL): 75cc    Complications: none immediate    Specimens:   ID Type Source Tests Collected by Time Destination   A : prostate Tissue Prostate SURGICAL PATHOLOGY Rogelio Combs DO 3/21/2024 1111        Implants:  * No implants in log *      Drains:   Closed/Suction Drain Right;Medial Bulb (Active)       Urinary Catheter 03/21/24 2 Way (Active)       Findings: see op note      Electronically signed by ROGELIO COMBS DO on 3/21/2024 at 11:35 AM

## 2024-03-21 NOTE — ANESTHESIA PRE PROCEDURE
(peptic ulcer disease) 1979   • Rectal hemorrhage    • Sinus tachycardia    • Sleep apnea     still waiting on supplies   • Weakness of right upper extremity        Past Surgical History:        Procedure Laterality Date   • ACHILLES TENDON SURGERY Left 8/8/2023    left achilles lengthening and total ankle arthroplasty performed by Lauro Oneil III, MD at [facility] OPC   • CATARACT REMOVAL Bilateral 2017   • CERVICAL FUSION  2013   • COLONOSCOPY     • HEENT  1988    acoustic neuroma   • LUMBAR FUSION  1992   • PROSTATE BIOPSY N/A 1/22/2024    PROSTATE BIOPSY FUSION performed by Jose Manuel Garg DO at [facility] MAIN OR   • TOTAL COLECTOMY Right 07/2019   • TOTAL HIP ARTHROPLASTY Left 07/2020   • UPPER GASTROINTESTINAL ENDOSCOPY N/A 7/1/2023    EGD FOREIGN BODY REMOVAL/ BALLOON DILATION/ BIOPSIES performed by Selvin Ba MD at [facility] ENDOSCOPY       Social History:    Social History     Tobacco Use   • Smoking status: Former   • Smokeless tobacco: Former     Quit date: 1/1/2018   • Tobacco comments:     Quit smoking: occ. cigar   Substance Use Topics   • Alcohol use: Yes     Alcohol/week: 6.0 standard drinks of alcohol     Types: 6 Cans of beer per week     Comment: occ                                Counseling given: Not Answered  Tobacco comments: Quit smoking: occ. cigar      Vital Signs (Current):   Vitals:    03/21/24 0843   BP: (!) 197/92   Pulse: 70   Resp: 16   Temp: 97.2 °F (36.2 °C)   TempSrc: Oral   SpO2: 96%   Weight: 89.8 kg (198 lb)   Height: 1.702 m (5' 7\")                                              BP Readings from Last 3 Encounters:   03/21/24 (!) 197/92   03/14/24 (!) 172/83   01/22/24 (!) 124/59       NPO Status:                                                                                 BMI:   Wt Readings from Last 3 Encounters:   03/21/24 89.8 kg (198 lb)   03/14/24 90.1 kg (198 lb 11.2 oz)   01/22/24 92.1 kg (203 lb)     Body mass index is 31.01 kg/m².    CBC:   Lab Results   Component Value

## 2024-03-21 NOTE — PERIOP NOTE
TRANSFER - OUT REPORT:    Verbal report given to MERY Morfin on Octavio Garcia  being transferred to [facility] for routine progression of patient care       Report consisted of patient’s Situation, Background, Assessment and   Recommendations(SBAR).     Information from the following report(s) Nurse Handoff Report, Surgery Report, MAR, Cardiac Rhythm sinus rhythm, and Neuro Assessment was reviewed with the receiving nurse.    Lines:   Peripheral IV 03/21/24 Left;Posterior Hand (Active)   Site Assessment Clean, dry & intact 03/21/24 1138   Line Status Infusing 03/21/24 1138   Line Care Connections checked and tightened 03/21/24 1138   Phlebitis Assessment No symptoms 03/21/24 1138   Infiltration Assessment 0 03/21/24 1138   Dressing Status Clean, dry & intact 03/21/24 1138   Dressing Type Transparent 03/21/24 1138        Opportunity for questions and clarification was provided.      Patient transported with:   O2 @ 5 liters    VTE prophylaxis orders have been written for Octavio Garcia.    Patient and family given floor number and nurses name.  Family updated re: pt status after security code verified.

## 2024-03-21 NOTE — ANESTHESIA POSTPROCEDURE EVALUATION
Department of Anesthesiology  Postprocedure Note    Patient: Octavio Garcia  MRN: [id number]  YOB: 1950  Date of evaluation: 3/21/2024    Procedure Summary       Date: 03/21/24 Room / Location: [facility] MAIN OR  / [facility] MAIN OR    Anesthesia Start: 0922 Anesthesia Stop: 1137    Procedure: PROSTATECTOMY LAPAROSCOPIC ROBOTIC (Abdomen) Diagnosis:       Malignant neoplasm of prostate (HCC)      (Malignant neoplasm of prostate (HCC) [C61])    Providers: Jose Manuel Garg DO Responsible Provider: Liliana Kumar MD    Anesthesia Type: general ASA Status: 3            Anesthesia Type: No value filed.    Aleksandr Phase I: Aleksandr Score: 7    Aleksandr Phase II:      Anesthesia Post Evaluation    Patient location during evaluation: PACU  Patient participation: complete - patient participated  Level of consciousness: awake and alert  Airway patency: patent  Nausea & Vomiting: no nausea  Cardiovascular status: hemodynamically stable  Respiratory status: acceptable  Hydration status: euvolemic  Pain management: adequate and satisfactory to patient        No notable events documented.

## 2024-03-21 NOTE — PERIOP NOTE
Patient noted to be maintaining oxygen saturations from 88-92% on 5 L NC. Patient able to bring oxygen to 92% with focus but desaturates with rest. Anesthesiologist called to notify. Patient instructed on incentive spirometer use. Lung sounds clear but diminished in lower bases.

## 2024-03-21 NOTE — OP NOTE
[address]  [address]                            OPERATIVE REPORT      PATIENT NAME: KATARZYNA MAGAÑA           : 1950  MED REC NO: [id number]                       ROOM: [facility] NO: [id number]                       ADMIT DATE: 2024  PROVIDER: Jose Manuel Garg DO    DATE OF SERVICE:  2024    PREOPERATIVE DIAGNOSES:  Prostate cancer.    POSTOPERATIVE DIAGNOSES:  Prostate cancer.    PROCEDURES PERFORMED:  Robotic-assisted laparoscopic radical prostatectomy.    SURGEON:  Jose Manuel Garg DO    ASSISTANT:  None.    ANESTHESIA:  General.    ESTIMATED BLOOD LOSS:  75 mL.    SPECIMENS REMOVED:  Prostate.    INTRAOPERATIVE FINDINGS:  Please see dictated operative note.     COMPLICATIONS:  None immediate.    IMPLANTS:  None.    INDICATIONS:  This 73-year-old gentleman recently found to have an elevated PSA of 9.7 on 2023.  MRI fusion biopsies on 2024 confirmed a Tony 6 adenocarcinoma of the prostate.  All risks, benefits, and alternatives to the above-mentioned procedure have been reviewed and he is willing to proceed at this time.    DESCRIPTION OF PROCEDURE:  The patient's consent was obtained.  The patient was brought back to the operating room, at which time he was placed in the supine position.  After the uneventful induction of general anesthesia, he was then placed in the low lithotomy position.  His genital and abdominal areas were prepped and draped and a sterile field applied.  An 18-Czech Méndez catheter was placed to dependent drainage.  A small periumbilical incision was made and a Veress needle was inserted into the abdominal cavity without event.  The abdomen was then insufflated with CO2.  Ports were then placed in the standard robotic prostatectomy fashion under direct vision.  The patient was then placed in a steep Trendelenburg position and the robot was docked.  There were some mild

## 2024-03-22 VITALS
RESPIRATION RATE: 20 BRPM | BODY MASS INDEX: 31.08 KG/M2 | HEIGHT: 67 IN | OXYGEN SATURATION: 92 % | HEART RATE: 87 BPM | WEIGHT: 198 LBS | TEMPERATURE: 97.5 F | SYSTOLIC BLOOD PRESSURE: 153 MMHG | DIASTOLIC BLOOD PRESSURE: 94 MMHG

## 2024-03-22 LAB
ANION GAP SERPL CALC-SCNC: 4 MMOL/L (ref 2–11)
BUN SERPL-MCNC: 13 MG/DL (ref 8–23)
CALCIUM SERPL-MCNC: 8.4 MG/DL (ref 8.3–10.4)
CHLORIDE SERPL-SCNC: 103 MMOL/L (ref 103–113)
CO2 SERPL-SCNC: 31 MMOL/L (ref 21–32)
CREAT SERPL-MCNC: 1.2 MG/DL (ref 0.8–1.5)
GLUCOSE SERPL-MCNC: 159 MG/DL (ref 65–100)
HCT VFR BLD AUTO: 34.5 % (ref 41.1–50.3)
HGB BLD-MCNC: 11.4 G/DL (ref 13.6–17.2)
POTASSIUM SERPL-SCNC: 4.3 MMOL/L (ref 3.5–5.1)
SODIUM SERPL-SCNC: 138 MMOL/L (ref 136–146)

## 2024-03-22 PROCEDURE — 2580000003 HC RX 258: Performed by: UROLOGY

## 2024-03-22 PROCEDURE — 36415 COLL VENOUS BLD VENIPUNCTURE: CPT

## 2024-03-22 PROCEDURE — G0378 HOSPITAL OBSERVATION PER HR: HCPCS

## 2024-03-22 PROCEDURE — 80048 BASIC METABOLIC PNL TOTAL CA: CPT

## 2024-03-22 PROCEDURE — 6360000002 HC RX W HCPCS: Performed by: UROLOGY

## 2024-03-22 PROCEDURE — 99024 POSTOP FOLLOW-UP VISIT: CPT | Performed by: UROLOGY

## 2024-03-22 PROCEDURE — 6370000000 HC RX 637 (ALT 250 FOR IP): Performed by: UROLOGY

## 2024-03-22 PROCEDURE — 85018 HEMOGLOBIN: CPT

## 2024-03-22 PROCEDURE — 85014 HEMATOCRIT: CPT

## 2024-03-22 RX ORDER — PSEUDOEPHEDRINE HCL 30 MG
100 TABLET ORAL 2 TIMES DAILY
Qty: 60 CAPSULE | Refills: 0 | Status: SHIPPED | OUTPATIENT
Start: 2024-03-22

## 2024-03-22 RX ORDER — CIPROFLOXACIN 500 MG/1
500 TABLET, FILM COATED ORAL 2 TIMES DAILY
Qty: 14 TABLET | Refills: 0 | Status: SHIPPED | OUTPATIENT
Start: 2024-03-22 | End: 2024-03-29

## 2024-03-22 RX ORDER — HYDROCODONE BITARTRATE AND ACETAMINOPHEN 5; 325 MG/1; MG/1
1 TABLET ORAL EVERY 4 HOURS PRN
Qty: 12 TABLET | Refills: 0 | Status: SHIPPED | OUTPATIENT
Start: 2024-03-22 | End: 2024-03-25

## 2024-03-22 RX ADMIN — CEFAZOLIN SODIUM 2000 MG: 100 INJECTION, POWDER, LYOPHILIZED, FOR SOLUTION INTRAVENOUS at 05:32

## 2024-03-22 RX ADMIN — DOCUSATE SODIUM 100 MG: 100 CAPSULE, LIQUID FILLED ORAL at 08:15

## 2024-03-22 RX ADMIN — PANTOPRAZOLE SODIUM 40 MG: 40 TABLET, DELAYED RELEASE ORAL at 05:32

## 2024-03-22 RX ADMIN — DEXTROSE AND SODIUM CHLORIDE: 5; 450 INJECTION, SOLUTION INTRAVENOUS at 10:11

## 2024-03-22 RX ADMIN — HYDROCODONE BITARTRATE AND ACETAMINOPHEN 1 TABLET: 5; 325 TABLET ORAL at 01:21

## 2024-03-22 RX ADMIN — CEFAZOLIN SODIUM 2000 MG: 100 INJECTION, POWDER, LYOPHILIZED, FOR SOLUTION INTRAVENOUS at 13:20

## 2024-03-22 ASSESSMENT — PAIN DESCRIPTION - DESCRIPTORS: DESCRIPTORS: ACHING

## 2024-03-22 ASSESSMENT — PAIN SCALES - GENERAL
PAINLEVEL_OUTOF10: 0
PAINLEVEL_OUTOF10: 5

## 2024-03-22 ASSESSMENT — PAIN DESCRIPTION - LOCATION: LOCATION: FLANK

## 2024-03-22 ASSESSMENT — PAIN DESCRIPTION - ORIENTATION: ORIENTATION: RIGHT

## 2024-03-22 NOTE — DISCHARGE SUMMARY
Discharge Summary    Date: 3/22/2024  Patient Name: Octavio Garcia    YOB: 1950     Age: 73 y.o.    Admit Date: 3/21/2024  Discharge Date: 3/22/2024  Discharge Condition: Stable    Admission Diagnosis  Malignant neoplasm of prostate (HCC) [C61];Prostate cancer (HCC) [C61]      Discharge Diagnosis  Principal Problem:    Malignant neoplasm of prostate (HCC)  Active Problems:    Prostate cancer (HCC)  Resolved Problems:    * No resolved hospital problems. *      Hospital Stay  Narrative of Hospital Course:  73 y.o., male returns in follow up for CaP.  PSA was 9.7 on 12/5/23. MRI on 11/10/23 shows a small ind LB lesion.   Fusion biopsies completed on 1/22/24.  Path showed [employer] in 11/13 areas.  Reports ED.  Prior R robotic hemicolectomy with Dr. Che in 2019.  Reports recent episodes of diarrhea.  No other prior abd surgery.      3/21/24 underwent  Robotic-assisted laparoscopic radical prostatectomy with Dr. Garg.    Uneventful hospital course.    3/22/24 discharged home in stable condition.         Consultants:  None    Surgeries/procedures Performed:   Robotic-assisted laparoscopic radical prostatectomy.          Treatments:    Analgesia, IV Hydration and Surgery    Acetaminophen w/ Codeine    Discharge Plan/Disposition:  Home    Hospital/Incidental Findings Requiring Follow Up:    Patient Instructions:    Diet: Regular Diet    Activity:No Lifting, Driving or Strenuous Excercise and No Heavy Lifting  For number of days (if applicable):      Other Instructions:    Provider Follow-Up:   No follow-ups on file.     Significant Diagnostic Studies:    Recent Labs:  Admission on 03/21/2024  Crossmatch expiration date                    Date: 03/21/2024  Value: 03/24/2024,2359                       Status: Final  ABO/Rh                                        Date: 03/21/2024  Value: A POSITIVE    Status: Final  Antibody Screen                               Date: 03/21/2024  Value: NEG

## 2024-03-22 NOTE — PLAN OF CARE
Problem: Pain  Goal: Verbalizes/displays adequate comfort level or baseline comfort level  Outcome: Progressing  Flowsheets (Taken 3/21/2024 1950)  Verbalizes/displays adequate comfort level or baseline comfort level:   Encourage patient to monitor pain and request assistance   Assess pain using appropriate pain scale   Administer analgesics based on type and severity of pain and evaluate response     Problem: Safety - Adult  Goal: Free from fall injury  Outcome: Progressing     Problem: Discharge Planning  Goal: Discharge to home or other facility with appropriate resources  Outcome: Progressing

## 2024-03-22 NOTE — PROGRESS NOTES
Admit Date: 3/21/2024      Subjective:     Octavio Garcia is POD 1 Procedure(s):  PROSTATECTOMY LAPAROSCOPIC ROBOTIC    No new complaints.    Objective:     Patient Vitals for the past 8 hrs:   BP Temp Temp src Pulse Resp SpO2   03/22/24 0739 (!) 153/94 97.5 °F (36.4 °C) Oral 87 20 92 %   03/22/24 0356 (!) 161/79 98.2 °F (36.8 °C) Oral 74 20 97 %     03/22 0701 - 03/22 1900  In: 240 [P.O.:240]  Out: 405 [Urine:375; Drains:30]  03/20 1901 - 03/22 0700  In: 500 [I.V.:500]  Out: 1120 [Urine:800; Drains:245]    Physical Exam:  GENERAL ASSESSMENT: alert, oriented to person, place and time, no acute distress and no anxiety, depression or agitation  Chest: normal work of breathing  CVS exam: normal rate, regular rhythm, normal S1, S2, no murmurs, rubs, clicks or gallops.  ABDOMEN: not done  Neurological exam reveals alert, oriented, normal speech, no focal findings or movement disorder noted.  FEMALE GENITOURINARY EXAM: not done  MALE GENITAL EXAM: not done    Data Review   Recent Results (from the past 24 hour(s))   Hemoglobin and Hematocrit    Collection Time: 03/21/24 11:54 AM   Result Value Ref Range    Hemoglobin 13.6 13.6 - 17.2 g/dL    Hematocrit 41.3 41.1 - 50.3 %   POCT Blood Gas & Electrolytes    Collection Time: 03/21/24  4:45 PM   Result Value Ref Range    POC pH 7.34 (L) 7.35 - 7.45      POC pCO2 46.4 (H) 35 - 45 MMHG    POC PO2 65 (L) 75 - 100 MMHG    POC Sodium 138 136 - 145 MMOL/L    POC Potassium 4.7 3.5 - 5.1 MMOL/L    POC Ionized Calcium 1.17 1.12 - 1.32 mmol/L    POC Glucose 177 (H) 65 - 100 MG/DL    Base Deficit (POC) 0.9 mmol/L    POC HCO3 25.2 22 - 26 MMOL/L    POC TCO2 25 (H) 13 - 23 MMOL/L    POC O2 SAT 91 %    Source ARTERIAL      Site LEFT RADIAL      Danial Test Positive      DEVICE NASAL CANNULA      FIO2 5      Performed by: Kiersten     Anion Gap, POC Cannot be calculated  Cannot be calculated   mmol/L    eGFR, POC Cannot be calculated >60 ml/min/1.73m2   Basic Metabolic Panel

## 2024-03-22 NOTE — CARE COORDINATION
CM spoke to Mr. Garcia and his wife in room 617 about discharge planning.  He is inpatient status POD #1 RALP for prostate CA.  He has a BRIGITTE drain and is on supplemental oxygen at 2 lpm via NC.      Prior to admit, he was living with his wife and was independent with ADLs.  At discharge, plan is home, no additional discharge needs unless he needs supplemental oxygen (he does not have it at home).       03/22/24 0851   Service Assessment   Patient Orientation Alert and Oriented   Cognition Alert   History Provided By Patient   Primary Caregiver Self   Accompanied By/Relationship wife   Support Systems Spouse/Significant Other   PCP Verified by CM Yes   Prior Functional Level Independent in ADLs/IADLs   Current Functional Level Independent in ADLs/IADLs   Can patient return to prior living arrangement Yes   Ability to make needs known: Good   Family able to assist with home care needs: Yes   Would you like for me to discuss the discharge plan with any other family members/significant others, and if so, who? No   Condition of Participation: Discharge Planning   The Plan for Transition of Care is related to the following treatment goals: home when stable

## 2024-03-22 NOTE — PROGRESS NOTES
Pt  bed in lowest position, wheels locked, and call light within reach. Hourly rounds completed. IV is patent and dressing is clean, dry, and intact. Pain treated per MAR. BRIGITTE drain dressing with old drainage noted.

## 2024-03-28 ENCOUNTER — OFFICE VISIT (OUTPATIENT)
Dept: UROLOGY | Age: 74
End: 2024-03-28

## 2024-03-28 DIAGNOSIS — C61 PROSTATE CANCER (HCC): Primary | ICD-10-CM

## 2024-03-28 PROCEDURE — 99024 POSTOP FOLLOW-UP VISIT: CPT | Performed by: UROLOGY

## 2024-03-28 RX ORDER — TADALAFIL 20 MG/1
20 TABLET ORAL DAILY PRN
Qty: 10 TABLET | Refills: 5 | Status: SHIPPED | OUTPATIENT
Start: 2024-03-28

## 2024-03-28 NOTE — PROGRESS NOTES
Not on file     Social Determinants of Health     Financial Resource Strain: Not on file   Food Insecurity: No Food Insecurity (3/22/2024)    Hunger Vital Sign     Worried About Running Out of Food in the Last Year: Never true     Ran Out of Food in the Last Year: Never true   Transportation Needs: No Transportation Needs (3/22/2024)    PRAPARE - Transportation     Lack of Transportation (Medical): No     Lack of Transportation (Non-Medical): No   Physical Activity: Not on file   Stress: Not on file   Social Connections: Not on file   Intimate Partner Violence: Not on file   Housing Stability: Low Risk  (3/22/2024)    Housing Stability Vital Sign     Unable to Pay for Housing in the Last Year: No     Number of Places Lived in the Last Year: 1     Unstable Housing in the Last Year: No     Family History   Problem Relation Age of Onset    Cancer Mother         breast    Diabetes Mother     Hypertension Mother     Heart Attack Father     Hypertension Brother        Review of Systems  All systems reviewed and are negative at this time.    Physical Exam  There were no vitals taken for this visit.  General appearance - alert, well appearing, and in no distress  Mental status - alert, oriented to person, place, and time  Eyes - extraocular eye movements intact, sclera anicteric  Abdomen - soft, nontender, nondistended, no masses or organomegaly.  Ports healing well.  Mild periumb ecchymosis.  Neurological -  normal speech, no focal findings or movement disorder noted  Skin - normal coloration and turgor      Assessment/Plan    ICD-10-CM    1. Prostate cancer (HCC)  C61 PSA, ultrasensitive        Méndez removed.  Path reviewed.  RTO in 3 mo with PSA prior.  Pt started on Cialis for penile rehab.  He denies nitrate use.    ROGELIO COMBS, DO

## 2024-04-16 ENCOUNTER — HOSPITAL ENCOUNTER (OUTPATIENT)
Dept: PHYSICAL THERAPY | Age: 74
Setting detail: RECURRING SERIES
Discharge: HOME | End: 2024-04-19
Attending: UROLOGY
Payer: MEDICARE

## 2024-04-16 DIAGNOSIS — N39.3 SUI (STRESS URINARY INCONTINENCE, FEMALE): Primary | ICD-10-CM

## 2024-04-16 PROCEDURE — 97110 THERAPEUTIC EXERCISES: CPT

## 2024-04-16 ASSESSMENT — PAIN SCALES - GENERAL: PAINLEVEL_OUTOF10: 0

## 2024-04-16 NOTE — THERAPY RECERTIFICATION
questions concerning certain urinary, bowel and sexual symptoms. Each question is scored on a 0-4 scale, 4 representing the greatest disability. There are 5 sub-scores, each out of 12 and a total overall symptom score out of 60. The higher the score, suggests a higher disability.    Medical Necessity:   > Patient demonstrates good rehab potential due to higher previous functional level.  Reason For Services/Other Comments:  > Patient continues to require skilled intervention due to above mentioned deficits.    Regarding Octavio Ludwig Jose's therapy, I certify that the treatment plan above will be carried out by a therapist or under their direction.  Thank you for this referral,  Vesta Shea, PT     Referring Physician Signature: Jose Manuel Garg, DO _______________________________ Date _____________        Charge Capture  Appt Desk     Future Appointments   Date Time Provider Department Center   4/25/2024 10:00 AM Vesta Shea PT [facility]   4/30/2024  8:00 AM Vesta Shea PT [facility]   5/1/2024 10:20 AM Lauro Oneil III, MD [facility]   5/7/2024  8:00 AM Vesta Shea PT [facility]   5/14/2024  8:00 AM Vesta Shea PT [facility]   5/21/2024  8:00 AM Vesta Shea PT [facility]   6/5/2024 11:00 AM [facility]

## 2024-04-16 NOTE — PROGRESS NOTES
kegel and breathing in order improve coordination, awareness and to minimize symptoms.    MANUAL THERAPY: ( minutes): to improve soft tissue tone    Date Type Location Comments   4/16/2024 Internal assessment/treatment                                         (Used abbreviations: MET - muscle energy technique; SCS- Strain counter strain; CTM-Connective tissue mobilizations; CR- Contract/Relax; SP- Sustained pressure; PIT- Positional inhibition techniques; STM Soft -tissue mobilization; MM- Myofascial mobilization; TrP-Trigger point release; IASTM- Instrument assisted soft tissue mobilizations, TDN-Trigger point dry needling)      NEURO REEDUCATION: () to improve control and coordination of pelvic floor     Date:   Date:   Date:     Activity/Exercise Parameters Parameters Parameters   Biofeedback With sEMG was utilized for coordination of PFM.                                                THERAPEUTIC ACTIVITY: ( minutes):     TA Educational Topic Notes Date Completed   Pathology/Anatomy/PFM Function     Bladder health education     Urinary urge suppression     The knack     Voiding strategies     Nocturia tips     Bowel/Bladder log     Bowel health education     Constipation care     Diarrhea/Fecal leakage     Colonic massage     Toilet positioning     Defecation dynamics     Sources of fiber     Return to intercourse/Dilator training     Sexual positioning     Lubricants/vaginal moisturizers     Vaginal irritants     Body mechanics     Posture/ergonomics     Diaphragmatic breathing     Resources and technology        Access Code: [email or web address]  URL: [email or web address]  Date: 04/16/2024  Prepared by: Vesta Shea    Exercises  - Supine Hip Adduction with Ball and Pelvic Contraction  - 1 x daily - 7 x weekly - 2 sets - 10 reps  - Hooklying Clamshells with band and pelvic muscle contraction  - 1 x daily - 7 x weekly - 2 sets - 10 reps  - Supine Bridge with Pelvic Floor Contraction  - 1 x daily - 7 x

## 2024-04-25 ENCOUNTER — APPOINTMENT (OUTPATIENT)
Dept: PHYSICAL THERAPY | Age: 74
End: 2024-04-25
Attending: UROLOGY
Payer: MEDICARE

## 2024-04-30 ENCOUNTER — HOSPITAL ENCOUNTER (OUTPATIENT)
Dept: PHYSICAL THERAPY | Age: 74
Setting detail: RECURRING SERIES
Discharge: HOME | End: 2024-05-03
Attending: UROLOGY
Payer: MEDICARE

## 2024-05-01 ENCOUNTER — OFFICE VISIT (OUTPATIENT)
Dept: ORTHOPEDIC SURGERY | Age: 74
End: 2024-05-01
Payer: MEDICARE

## 2024-05-01 DIAGNOSIS — Z96.662 HISTORY OF TOTAL ANKLE REPLACEMENT, LEFT: Primary | ICD-10-CM

## 2024-05-01 DIAGNOSIS — M19.071 ARTHRITIS OF RIGHT ANKLE: ICD-10-CM

## 2024-05-01 PROCEDURE — 1123F ACP DISCUSS/DSCN MKR DOCD: CPT | Performed by: ORTHOPAEDIC SURGERY

## 2024-05-01 PROCEDURE — 1036F TOBACCO NON-USER: CPT | Performed by: ORTHOPAEDIC SURGERY

## 2024-05-01 PROCEDURE — 3017F COLORECTAL CA SCREEN DOC REV: CPT | Performed by: ORTHOPAEDIC SURGERY

## 2024-05-01 PROCEDURE — G8417 CALC BMI ABV UP PARAM F/U: HCPCS | Performed by: ORTHOPAEDIC SURGERY

## 2024-05-01 PROCEDURE — 99214 OFFICE O/P EST MOD 30 MIN: CPT | Performed by: ORTHOPAEDIC SURGERY

## 2024-05-01 PROCEDURE — G8428 CUR MEDS NOT DOCUMENT: HCPCS | Performed by: ORTHOPAEDIC SURGERY

## 2024-05-01 NOTE — PROGRESS NOTES
Name: Octavio Garcia  YOB: 1950  Gender: male  MRN: [id number]    Summary:   Left total ankle arthroplasty, right end-stage ankle arthritis       CC: Follow-up (1. Left Total ankle arthroplasty/2. Left gastrocnemius recession/3. Left midfoot capsulotomy with extensor tendon lengthening/4. Left prophylactic fixation of tibia/DOS: 8/8/2023//Wants to discuss replacing the right ankle this winter/XR obtained in office)       HPI: Octavio Garcia is a 73 y.o. male who presents with Follow-up (1. Left Total ankle arthroplasty/2. Left gastrocnemius recession/3. Left midfoot capsulotomy with extensor tendon lengthening/4. Left prophylactic fixation of tibia/DOS: 8/8/2023//Wants to discuss replacing the right ankle this winter/XR obtained in office)  .  This patient presents to the office today for long-term follow-up of his left ankle total ankle arthroplasty.  He is very pleased with his progress on this.  The right ankle is getting much worse in terms of pain and debility.    History was obtained by Patient     ROS/Meds/PSH/PMH/FH/SH: I personally reviewed the patients standard intake form.  Below are the pertinents    Tobacco:  reports that he has quit smoking. He quit smokeless tobacco use about 6 years ago.  Diabetes: None      Physical Examination:    Exam of the left foot and ankle shows well-healed surgical incision with good range of motion and no pain or crepitus.  The right ankle shows limited tibiotalar joint range of motion with palpable pulses and intact sensation.    Imaging:   Interpretation of imaging  Bilateral ankles XR: AP, Lateral, Oblique views     ICD-10-CM    1. History of total ankle replacement, left  Z96.662 XR ANKLE STANDARD BILATERAL      2. Arthritis of right ankle  M19.071          Report: AP, lateral, oblique x-ray of the bilateral ankles demonstrates stable left total ankle arthroplasty, right end-stage ankle arthritis    Impression: Stable left total ankle

## 2024-05-07 ENCOUNTER — HOSPITAL ENCOUNTER (OUTPATIENT)
Dept: PHYSICAL THERAPY | Age: 74
Setting detail: RECURRING SERIES
Discharge: HOME | End: 2024-05-10
Attending: UROLOGY
Payer: MEDICARE

## 2024-05-07 PROCEDURE — 97110 THERAPEUTIC EXERCISES: CPT

## 2024-05-07 ASSESSMENT — PAIN SCALES - GENERAL: PAINLEVEL_OUTOF10: 0

## 2024-05-07 NOTE — PROGRESS NOTES
Octaviotimothy Garcia  : 1950  Primary: Medicare Part A And B (Medicare)  Secondary: [facility] [employer] MEDICARE SU[employer]  [address]  [address]  [facility] [address]  Phone: [phone]  Fax: [phone] Plan Frequency: one time a week for 90 days 3 weeks following surgery  Plan of Care/Certification Expiration Date: 24        Plan of Care/Certification Expiration Date:  Plan of Care/Certification Expiration Date: 24    Frequency/Duration: Plan Frequency: one time a week for 90 days 3 weeks following surgery      Time In/Out:   Time In: 1035  Time Out: 1120      PT Visit Info:    Total # of Visits to Date: 3       OUTPATIENT PHYSICAL THERAPY:   Treatment Note 2024       Episode  (PFPT-Prostate Cancer)               Treatment Diagnosis:     Other lack of coordination  Muscle weakness (generalized)  Stress Incontinence   Contributing Diagnosis:  Malignant neoplasm of prostate (C61) and Nocturia (R35.1)  Medical/Referring Diagnosis:    Malignant neoplasm of prostate (HCC) [C61]    Referring Physician:  Jose Manuel Garg DO MD Orders:  PT Eval and Treat   Return MD Appt:  no scheduled  Date of Onset:  Onset Date: 24  Surgery date: 3/21/24   Allergies:   Patient has no known allergies.  Restrictions/Precautions:   None      Interventions Planned (Treatment may consist of any combination of the following):     See Assessment Note    Subjective Comments:   Pt is demonstrating improvements in urinary control since his last appointment. Patient saw Dr. Jaimes last week for 3 month f/u.  reports surgery went well. At first he wasn't able to control the urine. He was going through a lot of depends. Continues to have some leakage throughout the day. Has no pelvic pain.   Initial Pain Level::     0/10  Post Session Pain Level:       0/10  Medications Last Reviewed:  2024  Updated Objective Findings:    Urinary: Frequency 3 x/day, 1-2 x/night.  Positive for stress

## 2024-05-13 ENCOUNTER — APPOINTMENT (OUTPATIENT)
Dept: PHYSICAL THERAPY | Age: 74
End: 2024-05-13
Attending: UROLOGY
Payer: MEDICARE

## 2024-06-03 ENCOUNTER — HOSPITAL ENCOUNTER (OUTPATIENT)
Dept: PHYSICAL THERAPY | Age: 74
Setting detail: RECURRING SERIES
Discharge: HOME | End: 2024-06-06
Attending: UROLOGY
Payer: MEDICARE

## 2024-06-03 PROCEDURE — 97110 THERAPEUTIC EXERCISES: CPT

## 2024-06-03 ASSESSMENT — PAIN SCALES - GENERAL: PAINLEVEL_OUTOF10: 0

## 2024-06-03 NOTE — THERAPY DISCHARGE
Octavio Garcia  : 1950  Primary: Medicare Part A And B (Medicare)  Secondary: [facility] [employer] MED[employer]  [address]  [address]  [place] [address]  Phone: [phone]  Fax: [phone] Plan Frequency: one time a week for 90 days 3 weeks following surgery    Plan of Care/Certification Expiration Date: 24        Plan of Care/Certification Expiration Date:  Plan of Care/Certification Expiration Date: 24    Frequency/Duration: Plan Frequency: one time a week for 90 days 3 weeks following surgery      Time In/Out:   Time In: 1005  Time Out: 1020      PT Visit Info:    Plan Frequency: one time a week for 90 days 3 weeks following surgery  Total # of Visits to Date: 3      Visit Count:  3                OUTPATIENT PHYSICAL THERAPY: Discharge Summary 6/3/2024               Episode (PFPT-Prostate Cancer)         Treatment Diagnosis:     Other lack of coordination  Muscle weakness (generalized)  Stress Incontinence  Contributing Diagnosis:  Malignant neoplasm of prostate (C61) and Nocturia (R35.1)  Medical/Referring Diagnosis:    Malignant neoplasm of prostate (HCC) [C61]      Referring Physician:  Jose Manuel Garg DO MD Orders:  PT Eval and Treat   Return MD Appt:  not scheduled, pt to reach out the MD office to schedule  Date of Onset:  Onset Date: 24     Allergies:  Patient has no known allergies.  Restrictions/Precautions:    None      Medications Last Reviewed:  6/3/2024     SUBJECTIVE   History of Injury/Illness (Reason for Referral):  Mr. Garcia is 72 yo male referred to pelvic floor physical therapy (PFPT) 2/ prostate cancer by Jose Manuel Garg DO. Findings were found in routine screening. He will be undergoing RALP on 3-21-24. He decided to have surgery after his levels were up. No symptoms so far. He does urinate a lot. History of colon cancer.     6/3/24: Pt has done very well post operatively. He has some occasional mild YAMIL with

## 2024-06-03 NOTE — PROGRESS NOTES
Octaviotimothy Ludwig Jose  : 1950  Primary: Medicare Part A And B (Medicare)  Secondary: [facility] [employer] MED[employer]  [address]  [address]  [facility] [address]  Phone: [phone]  Fax: [phone] Plan Frequency: one time a week for 90 days 3 weeks following surgery  Plan of Care/Certification Expiration Date: 24        Plan of Care/Certification Expiration Date:  Plan of Care/Certification Expiration Date: 24    Frequency/Duration: Plan Frequency: one time a week for 90 days 3 weeks following surgery      Time In/Out:   Time In: 1005  Time Out: 1020      PT Visit Info:    Total # of Visits to Date: 3       OUTPATIENT PHYSICAL THERAPY:   Treatment Note 6/3/2024       Episode  (PFPT-Prostate Cancer)               Treatment Diagnosis:     Other lack of coordination  Muscle weakness (generalized)  Stress Incontinence   Contributing Diagnosis:  Malignant neoplasm of prostate (C61) and Nocturia (R35.1)  Medical/Referring Diagnosis:    Malignant neoplasm of prostate (HCC) [C61]    Referring Physician:  Jose Manuel Garg DO MD Orders:  PT Eval and Treat   Return MD Appt:  no scheduled  Date of Onset:  Onset Date: 24  Surgery date: 3/21/24   Allergies:   Patient has no known allergies.  Restrictions/Precautions:   None      Interventions Planned (Treatment may consist of any combination of the following):     See Assessment Note    Subjective Comments:   Doing very well. 2 occasions of mild UI with quick movements.  Initial Pain Level::     0/10  Post Session Pain Level:       0/10  Medications Last Reviewed:  6/3/2024  Updated Objective Findings:  See discharge assessment  Treatment   THERAPEUTIC EXERCISE: (12 minutes): Exercises per grid below to improve mobility, strength, and coordination.  Required minimal visual, verbal, manual, and tactile cues to promote proper body alignment, promote proper body posture, promote proper body mechanics, and promote proper

## 2024-07-11 ENCOUNTER — TELEPHONE (OUTPATIENT)
Dept: ORTHOPEDIC SURGERY | Age: 74
End: 2024-07-11

## 2024-07-11 ENCOUNTER — PATIENT MESSAGE (OUTPATIENT)
Dept: ORTHOPEDIC SURGERY | Age: 74
End: 2024-07-11

## 2024-07-11 NOTE — TELEPHONE ENCOUNTER
From: Octavio Garcia  To: Dr. Neo Oneil  Sent: 7/11/2024 2:39 AM EDT  Subject: Appointment Request    Appointment Request From: Octavio Garcia    With Provider: NEO ONEIL III, MD [[facility]]    Preferred Date Range: 7/11/2024 – 7/18/2024    Preferred Times: Any Time    Reason for visit: Request an Appointment    Comments:  I'm still waiting on my MRI appt.

## 2024-07-11 NOTE — TELEPHONE ENCOUNTER
Left voicemail for patient to reach out to [facility] to schedule the CT scan for his total ankle so that we would not have to postpone surgery. Told him to call back if he had questions or concerns.

## 2024-07-11 NOTE — TELEPHONE ENCOUNTER
Appointment Request  (Newest Message First)  Octavio Garcia  [facility]  Staff12 hours ago (2:39 AM)       Appointment Request From: Octavio Garcia     With Provider: NEO REDDY III, MD [[facility]]     Preferred Date Range: 7/11/2024 - 7/18/2024     Preferred Times: Any Time     Reason for visit: Request an Appointment     Comments:  I'm still waiting on my MRI  appt.

## 2024-07-12 NOTE — TELEPHONE ENCOUNTER
Called patient again today and let him know I faxed his CT order to [facility] and asked that they schedule him ASAP. I told him if he hasn't heard from them by Monday he should reach out to them at [phone].

## 2024-09-06 DIAGNOSIS — M19.071 ARTHRITIS OF RIGHT ANKLE: Primary | ICD-10-CM

## 2024-09-16 ENCOUNTER — TELEPHONE (OUTPATIENT)
Dept: ORTHOPEDIC SURGERY | Age: 74
End: 2024-09-16

## 2024-12-07 ENCOUNTER — APPOINTMENT (OUTPATIENT)
Dept: CT IMAGING | Age: 74
End: 2024-12-07
Payer: MEDICARE

## 2024-12-07 ENCOUNTER — HOSPITAL ENCOUNTER (EMERGENCY)
Age: 74
Discharge: HOME | End: 2024-12-07
Attending: STUDENT IN AN ORGANIZED HEALTH CARE EDUCATION/TRAINING PROGRAM
Payer: MEDICARE

## 2024-12-07 ENCOUNTER — APPOINTMENT (OUTPATIENT)
Dept: GENERAL RADIOLOGY | Age: 74
End: 2024-12-07
Payer: MEDICARE

## 2024-12-07 VITALS
HEART RATE: 69 BPM | RESPIRATION RATE: 16 BRPM | TEMPERATURE: 98.4 F | HEIGHT: 67 IN | WEIGHT: 195 LBS | SYSTOLIC BLOOD PRESSURE: 182 MMHG | BODY MASS INDEX: 30.61 KG/M2 | DIASTOLIC BLOOD PRESSURE: 86 MMHG | OXYGEN SATURATION: 94 %

## 2024-12-07 DIAGNOSIS — S49.92XA INJURY OF LEFT SHOULDER, INITIAL ENCOUNTER: Primary | ICD-10-CM

## 2024-12-07 PROCEDURE — 73200 CT UPPER EXTREMITY W/O DYE: CPT

## 2024-12-07 PROCEDURE — 6360000002 HC RX W HCPCS: Performed by: STUDENT IN AN ORGANIZED HEALTH CARE EDUCATION/TRAINING PROGRAM

## 2024-12-07 PROCEDURE — 73030 X-RAY EXAM OF SHOULDER: CPT

## 2024-12-07 PROCEDURE — 73080 X-RAY EXAM OF ELBOW: CPT

## 2024-12-07 PROCEDURE — 99284 EMERGENCY DEPT VISIT MOD MDM: CPT

## 2024-12-07 PROCEDURE — 96372 THER/PROPH/DIAG INJ SC/IM: CPT

## 2024-12-07 RX ORDER — KETOROLAC TROMETHAMINE 10 MG/1
10 TABLET, FILM COATED ORAL EVERY 6 HOURS PRN
Qty: 20 TABLET | Refills: 0 | Status: SHIPPED | OUTPATIENT
Start: 2024-12-07

## 2024-12-07 RX ORDER — KETOROLAC TROMETHAMINE 30 MG/ML
30 INJECTION, SOLUTION INTRAMUSCULAR; INTRAVENOUS
Status: COMPLETED | OUTPATIENT
Start: 2024-12-07 | End: 2024-12-07

## 2024-12-07 RX ADMIN — KETOROLAC TROMETHAMINE 30 MG: 30 INJECTION, SOLUTION INTRAMUSCULAR at 09:23

## 2024-12-07 ASSESSMENT — PAIN SCALES - GENERAL: PAINLEVEL_OUTOF10: 7

## 2024-12-07 ASSESSMENT — ENCOUNTER SYMPTOMS
EYE REDNESS: 0
SORE THROAT: 0
EYE PAIN: 0
SHORTNESS OF BREATH: 0
COUGH: 0
ABDOMINAL PAIN: 0
VOMITING: 0
NAUSEA: 0

## 2024-12-07 ASSESSMENT — PAIN - FUNCTIONAL ASSESSMENT: PAIN_FUNCTIONAL_ASSESSMENT: 0-10

## 2024-12-07 ASSESSMENT — PAIN DESCRIPTION - LOCATION: LOCATION: SHOULDER

## 2024-12-07 NOTE — ED NOTES
Patient mobility status  with no difficulty.     I have reviewed discharge instructions with the patient.  The patient verbalized understanding.    Patient left ED via Discharge Method: ambulatory to Home with Spouse.    Opportunity for questions and clarification provided.     Patient given 1- sent to the pharmacy. scripts.          La Mahmood RN  12/07/24 1110

## 2024-12-07 NOTE — ED NOTES
Patient mobility status  with no difficulty.     I have reviewed discharge instructions with the patient.  The patient verbalized understanding.    Patient left ED via Discharge Method: ambulatory to Home with Spouse.    Opportunity for questions and clarification provided.     Patient given 1-sent to the pharmacy scripts.          La Mahmood RN  12/07/24 8053

## 2024-12-07 NOTE — ED TRIAGE NOTES
Patient arrived with complaint of a fall on Monday- left shoulder pain. Patient states, \" it did not bother me until last night\"    Throbbing   Unable to move his left arm.

## 2024-12-07 NOTE — ED NOTES
Provider suggest to patient to go home and continue to take his bp medication that he has not been taking.      La Mahmood, RN  12/07/24 5097

## 2024-12-07 NOTE — ED PROVIDER NOTES
Emergency Department Provider Note       PCP: Kemmerlin, Richard W, MD   Age: 74 y.o.   Sex: male     DISPOSITION Decision To Discharge 12/07/2024 11:06:24 AM    ICD-10-CM    1. Injury of left shoulder, initial encounter  S49.92XA [facility]          Medical Decision Making     74-year-old male presents ED for shoulder and elbow pain.  Vital signs demonstrate hypertension, otherwise within normal limits.  States that he was recently taken off his blood pressure medications.  Physical exam was significantly limited mobility at the level of the shoulder, significant pain with passive range of motion at this joint as well.  Normal sensation and cap refill of all digits of left hand.  X-ray of the elbow demonstrates possible ossific consistency, radiology recommending CT.  X-ray of the shoulder unremarkable.  CT of the shoulder and x-ray obtained given persistent pain, CT demonstrates high riding shoulder that is concerning for possible rotator cuff injury which would certainly align with his symptoms.  Patient had sling ordered while in ED to go home with.  Will provide urgent referral to orthopedic surgery as well for MRI and possible rotator cuff repair.  CT of the elbow unremarkable.  Pain medication prescription given for home, neurovascularly intact at time of discharge.    Patient was instructed to follow-up with PCP in the next 24 to 48 hours and to follow-up with all specialists given with discharge as soon as possible.  Patient is nontoxic-appearing and has no complaints at time of discharge.  Instructed to return to the emergency department immediately if current symptoms worsen, or any new/concerning symptoms develop for which they voice understanding.  All questions answered at time of discharge.       1 or more acute illnesses that pose a threat to life or bodily function.   Prescription drug management performed.  Patient was discharged risks and benefits of

## 2024-12-09 ENCOUNTER — TELEPHONE (OUTPATIENT)
Dept: ORTHOPEDIC SURGERY | Age: 74
End: 2024-12-09

## 2024-12-09 NOTE — TELEPHONE ENCOUNTER
My message was for Dr. Oneil's team, patient is scheduled for surgery next month. Looks like the messages combined with Bridgette's message.

## 2024-12-10 ENCOUNTER — TELEPHONE (OUTPATIENT)
Dept: ORTHOPEDIC SURGERY | Age: 74
End: 2024-12-10

## 2024-12-10 NOTE — TELEPHONE ENCOUNTER
Spoke with wife, we are unable to order MRI without evaluation first, he may go to his PCP for this if he is trying to get one before the end of the year. She expressed understanding and thanked me for calling

## 2024-12-10 NOTE — TELEPHONE ENCOUNTER
His wife is calling to see if an MRI can be ordered prior to his appointment end of the month since it's the end of the year and they have met their deductible. I told her they will probably need to have their PCP order it since Kishor hasn't seen him yet. He was seen in the hospital and the doctor told him he would need an MRI.

## 2024-12-16 ENCOUNTER — OFFICE VISIT (OUTPATIENT)
Dept: ORTHOPEDIC SURGERY | Age: 74
End: 2024-12-16
Payer: MEDICARE

## 2024-12-16 DIAGNOSIS — M25.512 LEFT SHOULDER PAIN, UNSPECIFIED CHRONICITY: Primary | ICD-10-CM

## 2024-12-16 PROCEDURE — 1160F RVW MEDS BY RX/DR IN RCRD: CPT | Performed by: PHYSICIAN ASSISTANT

## 2024-12-16 PROCEDURE — 1036F TOBACCO NON-USER: CPT | Performed by: PHYSICIAN ASSISTANT

## 2024-12-16 PROCEDURE — 99203 OFFICE O/P NEW LOW 30 MIN: CPT | Performed by: PHYSICIAN ASSISTANT

## 2024-12-16 PROCEDURE — 1159F MED LIST DOCD IN RCRD: CPT | Performed by: PHYSICIAN ASSISTANT

## 2024-12-16 PROCEDURE — 3017F COLORECTAL CA SCREEN DOC REV: CPT | Performed by: PHYSICIAN ASSISTANT

## 2024-12-16 PROCEDURE — G8417 CALC BMI ABV UP PARAM F/U: HCPCS | Performed by: PHYSICIAN ASSISTANT

## 2024-12-16 PROCEDURE — G8427 DOCREV CUR MEDS BY ELIG CLIN: HCPCS | Performed by: PHYSICIAN ASSISTANT

## 2024-12-16 PROCEDURE — G8484 FLU IMMUNIZE NO ADMIN: HCPCS | Performed by: PHYSICIAN ASSISTANT

## 2024-12-16 PROCEDURE — 1123F ACP DISCUSS/DSCN MKR DOCD: CPT | Performed by: PHYSICIAN ASSISTANT
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
## 2025-03-27 DIAGNOSIS — C61 PROSTATE CANCER (HCC): Primary | ICD-10-CM

## 2025-03-28 RX ORDER — TADALAFIL 20 MG/1
TABLET ORAL
Qty: 18 TABLET | Refills: 0 | Status: SHIPPED | OUTPATIENT
Start: 2025-03-28

## 2025-05-06 DIAGNOSIS — C61 PROSTATE CANCER (HCC): ICD-10-CM

## 2025-05-06 RX ORDER — TADALAFIL 20 MG/1
TABLET ORAL
Qty: 18 TABLET | Refills: 0 | Status: SHIPPED | OUTPATIENT
Start: 2025-05-06

## 2025-05-07 ENCOUNTER — APPOINTMENT (OUTPATIENT)
Dept: GENERAL RADIOLOGY | Age: 75
End: 2025-05-07
Payer: MEDICARE

## 2025-05-07 ENCOUNTER — HOSPITAL ENCOUNTER (EMERGENCY)
Age: 75
Discharge: HOME | End: 2025-05-07
Payer: MEDICARE

## 2025-05-07 VITALS
HEART RATE: 73 BPM | DIASTOLIC BLOOD PRESSURE: 75 MMHG | HEIGHT: 67 IN | WEIGHT: 194 LBS | BODY MASS INDEX: 30.45 KG/M2 | SYSTOLIC BLOOD PRESSURE: 140 MMHG | TEMPERATURE: 99.5 F | RESPIRATION RATE: 18 BRPM | OXYGEN SATURATION: 93 %

## 2025-05-07 DIAGNOSIS — R07.81 RIB PAIN: Primary | ICD-10-CM

## 2025-05-07 PROCEDURE — 71046 X-RAY EXAM CHEST 2 VIEWS: CPT

## 2025-05-07 PROCEDURE — 6370000000 HC RX 637 (ALT 250 FOR IP)

## 2025-05-07 PROCEDURE — 96372 THER/PROPH/DIAG INJ SC/IM: CPT

## 2025-05-07 PROCEDURE — 6360000002 HC RX W HCPCS

## 2025-05-07 PROCEDURE — 99284 EMERGENCY DEPT VISIT MOD MDM: CPT

## 2025-05-07 RX ORDER — LIDOCAINE 50 MG/G
1 PATCH TOPICAL DAILY
Qty: 15 PATCH | Refills: 0 | Status: SHIPPED | OUTPATIENT
Start: 2025-05-07 | End: 2025-05-22

## 2025-05-07 RX ORDER — CYCLOBENZAPRINE HCL 10 MG
10 TABLET ORAL
Status: COMPLETED | OUTPATIENT
Start: 2025-05-07 | End: 2025-05-07

## 2025-05-07 RX ORDER — NAPROXEN 500 MG/1
500 TABLET ORAL 2 TIMES DAILY PRN
Qty: 30 TABLET | Refills: 0 | Status: SHIPPED | OUTPATIENT
Start: 2025-05-07 | End: 2025-05-22

## 2025-05-07 RX ORDER — METHOCARBAMOL 750 MG/1
750 TABLET, FILM COATED ORAL 3 TIMES DAILY
Qty: 21 TABLET | Refills: 0 | Status: SHIPPED | OUTPATIENT
Start: 2025-05-07 | End: 2025-05-14

## 2025-05-07 RX ORDER — KETOROLAC TROMETHAMINE 15 MG/ML
15 INJECTION, SOLUTION INTRAMUSCULAR; INTRAVENOUS ONCE
Status: DISCONTINUED | OUTPATIENT
Start: 2025-05-07 | End: 2025-05-07

## 2025-05-07 RX ORDER — KETOROLAC TROMETHAMINE 15 MG/ML
15 INJECTION, SOLUTION INTRAMUSCULAR; INTRAVENOUS
Status: COMPLETED | OUTPATIENT
Start: 2025-05-07 | End: 2025-05-07

## 2025-05-07 RX ADMIN — CYCLOBENZAPRINE 10 MG: 10 TABLET, FILM COATED ORAL at 10:32

## 2025-05-07 RX ADMIN — KETOROLAC TROMETHAMINE 15 MG: 15 INJECTION, SOLUTION INTRAMUSCULAR; INTRAVENOUS at 10:32

## 2025-05-07 ASSESSMENT — ENCOUNTER SYMPTOMS
RESPIRATORY NEGATIVE: 1
ALLERGIC/IMMUNOLOGIC NEGATIVE: 1
GASTROINTESTINAL NEGATIVE: 1
EYES NEGATIVE: 1

## 2025-05-07 ASSESSMENT — LIFESTYLE VARIABLES
HOW OFTEN DO YOU HAVE A DRINK CONTAINING ALCOHOL: NEVER
HOW MANY STANDARD DRINKS CONTAINING ALCOHOL DO YOU HAVE ON A TYPICAL DAY: PATIENT DOES NOT DRINK

## 2025-05-07 ASSESSMENT — PAIN DESCRIPTION - ORIENTATION: ORIENTATION: RIGHT;LEFT

## 2025-05-07 ASSESSMENT — PAIN - FUNCTIONAL ASSESSMENT: PAIN_FUNCTIONAL_ASSESSMENT: 0-10

## 2025-05-07 ASSESSMENT — PAIN DESCRIPTION - DESCRIPTORS: DESCRIPTORS: SORE;SHARP

## 2025-05-07 ASSESSMENT — PAIN DESCRIPTION - LOCATION: LOCATION: RIB CAGE

## 2025-05-07 ASSESSMENT — PAIN SCALES - GENERAL: PAINLEVEL_OUTOF10: 6
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
## 2025-05-07 NOTE — DISCHARGE INSTRUCTIONS
As we discussed, your x-rays does not show any signs of fracture.  You will likely continue to be sore over the next few days just due to the nature of the injury.  I have placed information in discharge paperwork for home care.  I will give you some anti-inflammatory pain medication to take as needed.  I will start you on a course of muscle relaxants.  I will also give you some lidocaine patches to place over affected areas of pain.  I do want you to get an incentive spirometer to use 6-8 times daily to prevent pneumonia.  This will encourage taking a deep breath.  Follow-up with primary care in 2 weeks for symptom recheck.  As we discussed, please return to the emergency department for any new, worsening, concerning symptoms.

## 2025-05-07 NOTE — ED TRIAGE NOTES
Pt ambulatory with cane to triage. Pt complains of a trip and fall last Tuesday. Complains of rib pain since the fall. Denies hitting head. Denies LOC. Denies blood thinners.

## 2025-08-07 DIAGNOSIS — C61 PROSTATE CANCER (HCC): Primary | ICD-10-CM

## 2025-08-18 ENCOUNTER — LAB (OUTPATIENT)
Dept: UROLOGY | Age: 75
End: 2025-08-18

## 2025-08-18 DIAGNOSIS — C61 PROSTATE CANCER (HCC): ICD-10-CM

## 2025-08-19 LAB — PSA SERPL DL<=0.01 NG/ML-MCNC: <0.006 NG/ML (ref 0–4)

## 2025-08-25 ENCOUNTER — OFFICE VISIT (OUTPATIENT)
Dept: UROLOGY | Age: 75
End: 2025-08-25
Payer: MEDICARE

## 2025-08-25 DIAGNOSIS — C61 PROSTATE CANCER (HCC): Primary | ICD-10-CM

## 2025-08-25 DIAGNOSIS — N52.31 ERECTILE DYSFUNCTION AFTER RADICAL PROSTATECTOMY: ICD-10-CM

## 2025-08-25 LAB
BILIRUBIN, URINE, POC: NEGATIVE
BLOOD URINE, POC: NORMAL
GLUCOSE URINE, POC: NEGATIVE MG/DL
KETONES, URINE, POC: NEGATIVE MG/DL
LEUKOCYTE ESTERASE, URINE, POC: NEGATIVE
NITRITE, URINE, POC: NEGATIVE
PH, URINE, POC: 6 (ref 4.6–8)
PROTEIN,URINE, POC: NEGATIVE MG/DL
SPECIFIC GRAVITY, URINE, POC: 1.03 (ref 1–1.03)
URINALYSIS CLARITY, POC: NORMAL
URINALYSIS COLOR, POC: NORMAL
UROBILINOGEN, POC: NORMAL MG/DL

## 2025-08-25 PROCEDURE — 81003 URINALYSIS AUTO W/O SCOPE: CPT | Performed by: NURSE PRACTITIONER

## 2025-08-25 PROCEDURE — G8427 DOCREV CUR MEDS BY ELIG CLIN: HCPCS | Performed by: NURSE PRACTITIONER

## 2025-08-25 PROCEDURE — 99213 OFFICE O/P EST LOW 20 MIN: CPT | Performed by: NURSE PRACTITIONER

## 2025-08-25 PROCEDURE — G8417 CALC BMI ABV UP PARAM F/U: HCPCS | Performed by: NURSE PRACTITIONER

## 2025-08-25 PROCEDURE — 1123F ACP DISCUSS/DSCN MKR DOCD: CPT | Performed by: NURSE PRACTITIONER

## 2025-08-25 PROCEDURE — 1160F RVW MEDS BY RX/DR IN RCRD: CPT | Performed by: NURSE PRACTITIONER

## 2025-08-25 PROCEDURE — 1036F TOBACCO NON-USER: CPT | Performed by: NURSE PRACTITIONER

## 2025-08-25 PROCEDURE — 1159F MED LIST DOCD IN RCRD: CPT | Performed by: NURSE PRACTITIONER

## 2025-08-25 RX ORDER — TADALAFIL 20 MG/1
20 TABLET ORAL PRN
Qty: 30 TABLET | Refills: 5 | Status: SHIPPED | OUTPATIENT
Start: 2025-08-25

## 2025-08-25 ASSESSMENT — ENCOUNTER SYMPTOMS: BACK PAIN: 0

## (undated) DEVICE — SUTURE FIBERWIRE SZ 2 W/ TAPERED NEEDLE BLUE L38IN NONABSORB BLU L26.5MM 1/2 CIRCLE AR7200

## (undated) DEVICE — BASIC SINGLE BASIN-LF: Brand: MEDLINE INDUSTRIES, INC.

## (undated) DEVICE — BLADELESS OBTURATOR: Brand: WECK VISTA

## (undated) DEVICE — TOTAL 1-LAYER TRAY, LATEX FOLEY, 16FR 10: Brand: MEDLINE

## (undated) DEVICE — LARGE TEAR CROSS CUT RASP (14.0 X 7.0MM)

## (undated) DEVICE — TALAR PEG DRILL: Brand: INBONE

## (undated) DEVICE — SOLUTION IRRIG 3000ML 0.9% SOD CHL FLX CONT 0797208] ICU MEDICAL INC]

## (undated) DEVICE — BLADE ELECTRODE: Brand: VALLEYLAB

## (undated) DEVICE — SOLUTION IV 1000ML 0.9% SOD CHL

## (undated) DEVICE — DRAPE TWL SURG 16X26IN BLU ORB04] ALLCARE INC]

## (undated) DEVICE — STRYKER PERFORMANCE SERIES SAGITTAL BLADE: Brand: STRYKER PERFORMANCE SERIES

## (undated) DEVICE — SYR 10ML LUER LOK 1/5ML GRAD --

## (undated) DEVICE — KENDALL SCD EXPRESS SLEEVES, KNEE LENGTH, MEDIUM: Brand: KENDALL SCD

## (undated) DEVICE — JELLY LUBRICATING 10GM PREFIL SYR LUBE

## (undated) DEVICE — FORCEPS BX L240CM JAW DIA2.8MM L CAP W/ NDL MIC MESH TOOTH

## (undated) DEVICE — REDUCER CANN ENDOWRIST 12-8MM -- DA VINCI XI - SNGL USE

## (undated) DEVICE — 2000CC GUARDIAN II: Brand: GUARDIAN

## (undated) DEVICE — NARROW SAW BLADE

## (undated) DEVICE — BLADELESS OPTICAL TROCAR WITH FIXATION CANNULA: Brand: VERSAPORT

## (undated) DEVICE — SOLUTION IV 250ML 0.9% SOD CHL CLR INJ FLX BG CONT PRT CLSR

## (undated) DEVICE — BLOCK BITE AD 60FR W/ VELC STRP ADDRESSES MOST PT AND

## (undated) DEVICE — SUTURE ETHLN SZ 3-0 L18IN NONABSORBABLE BLK PS-2 L19MM 3/8 1669H

## (undated) DEVICE — NEEDLE INSUF L120MM ULT VERES ENDOPATH

## (undated) DEVICE — DISPOSABLE GRASPER CARTRIDGE: Brand: DIRECT DRIVE REPOSABLE GRASPERS

## (undated) DEVICE — 1010 S-DRAPE TOWEL DRAPE 10/BX: Brand: STERI-DRAPE™

## (undated) DEVICE — SURGICAL PROCEDURE PACK TOT HIP CDS

## (undated) DEVICE — SINGLE PORT MANIFOLD: Brand: NEPTUNE 2

## (undated) DEVICE — ABDOMINAL PAD: Brand: DERMACEA

## (undated) DEVICE — TRAY PREP DRY W/ PREM GLV 2 APPL 6 SPNG 2 UNDPD 1 OVERWRAP

## (undated) DEVICE — AIRLIFE™ OXYGEN TUBING 7 FEET (2.1 M) CRUSH RESISTANT OXYGEN TUBING, VINYL TIPPED: Brand: AIRLIFE™

## (undated) DEVICE — COAXIAL HIGH FLOW TIP WITH SOFT SHIELD

## (undated) DEVICE — PAD,NON-ADHERENT,3X8,STERILE,LF,1/PK: Brand: MEDLINE

## (undated) DEVICE — COLUMN DRAPE

## (undated) DEVICE — GLOVE SURG SZ 8 L12IN FNGR THK79MIL GRN LTX FREE

## (undated) DEVICE — SUTURE MCRYL SZ 3-0 L27IN ABSRB UD L17MM RB-1 1/2 CIR Y215H

## (undated) DEVICE — PRECISION THIN (9.0 X 0.38 X 31.0MM)

## (undated) DEVICE — 3M™ STERI-DRAPE™ INCISE DRAPE, XL 1051: Brand: STERI-DRAPE™

## (undated) DEVICE — REM POLYHESIVE ADULT PATIENT RETURN ELECTRODE: Brand: VALLEYLAB

## (undated) DEVICE — CONTAINER,SPECIMEN,O.R.STRL,4.5OZ: Brand: MEDLINE

## (undated) DEVICE — SYSTEM SKIN CLSR 22CM DERMBND PRINEO

## (undated) DEVICE — CARDINAL HEALTH FLEXIBLE LIGHT HANDLE COVER: Brand: CARDINAL HEALTH

## (undated) DEVICE — DRILL ANTI-ROTATION NOTCH: Brand: INBONE

## (undated) DEVICE — NEEDLE SYR 18GA L1.5IN RED PLAS HUB S STL BLNT FILL W/O

## (undated) DEVICE — COVER PRB W1XL11.8IN ENDOCAVITY CLR E BND NEOGUARD

## (undated) DEVICE — MAX-CORE® DISPOSABLE CORE BIOPSY INSTRUMENT, 18G X 25CM: Brand: MAX-CORE

## (undated) DEVICE — APPLICATOR FBR TIP L6IN COT TIP WOOD SHFT SWAB 2000 PER CA

## (undated) DEVICE — ROBOTIC PROSTATE: Brand: MEDLINE INDUSTRIES, INC.

## (undated) DEVICE — SOLUTION IV 1000ML 0.9% SOD CHL PH 5 INJ USP VIAFLX PLAS

## (undated) DEVICE — YANKAUER,BULB TIP,W/O VENT,RIGID,STERILE: Brand: MEDLINE

## (undated) DEVICE — [HIGH FLOW INSUFFLATOR,  DO NOT USE IF PACKAGE IS DAMAGED,  KEEP DRY,  KEEP AWAY FROM SUNLIGHT,  PROTECT FROM HEAT AND RADIOACTIVE SOURCES.]: Brand: PNEUMOSURE

## (undated) DEVICE — STAPLER 45 RELOAD BLUE: Brand: ENDOWRIST

## (undated) DEVICE — ESOPHAGEAL BALLOON DILATATION CATHETER: Brand: CRE FIXED WIRE

## (undated) DEVICE — DEVICE INFLATION 60 CC INFLATORY

## (undated) DEVICE — ELECTRO LUBE IS A SINGLE PATIENT USE DEVICE THAT IS INTENDED TO BE USED ON ELECTROSURGICAL ELECTRODES TO REDUCE STICKING.: Brand: KEY SURGICAL ELECTRO LUBE

## (undated) DEVICE — CANNULA NSL ORAL AD FOR CAPNOFLEX CO2 O2 AIRLFE

## (undated) DEVICE — RETRIEVER ENDOSCP L230CM DIA2.5MM NET W3XL5.5CM MIN WRK CHN

## (undated) DEVICE — Z DISC USE 2764362 SEAL ENDOSCP INSTR DIA5-8MM UNIV FOR CANN DA VINCI XI

## (undated) DEVICE — NEEDLE HYPO 21GA L1.5IN INTRAMUSCULAR S STL LATCH BVL UP

## (undated) DEVICE — FENESTRATED BIPOLAR FORCEPS: Brand: ENDOWRIST

## (undated) DEVICE — GOWN,REINF,POLY,ECL,PP SLV,XL: Brand: MEDLINE

## (undated) DEVICE — MONOPOLAR CAUTERY CORD

## (undated) DEVICE — LAPSAC SURGICAL TISSUE POUCH: Brand: LAPSAC

## (undated) DEVICE — TRI-LUMEN FILTERED TUBE SET WITH ACTIVATED CHARCOAL FILTER: Brand: AIRSEAL

## (undated) DEVICE — SYRINGE MED 10ML LUERLOCK TIP W/O SFTY DISP

## (undated) DEVICE — ARM DRAPE

## (undated) DEVICE — GAUZE,SPONGE,4"X4",12PLY,WOVEN,NS,LF: Brand: MEDLINE

## (undated) DEVICE — (D)PREP SKN CHLRAPRP APPL 26ML -- CONVERT TO ITEM 371833

## (undated) DEVICE — 2, DISPOSABLE SUCTION/IRRIGATOR WITHOUT DISPOSABLE TIP: Brand: STRYKEFLOW

## (undated) DEVICE — SYR LR LCK 1ML GRAD NSAF 30ML --

## (undated) DEVICE — SPONGE LAP 18X18IN STRL -- 5/PK

## (undated) DEVICE — NEEDLE SPNL 22GA L3.5IN BLK HUB S STL REG WALL FIT STYL W/

## (undated) DEVICE — DRILL: Brand: INBONE

## (undated) DEVICE — TROCARS: Brand: KII® BALLOON BLUNT TIP SYSTEM

## (undated) DEVICE — SOL IRR SOD CL 0.9% 1000ML BTL --

## (undated) DEVICE — STOCKINETTE TUBE 6X48 -- MEDICHOICE

## (undated) DEVICE — KENDALL RADIOLUCENT FOAM MONITORING ELECTRODE RECTANGULAR SHAPE: Brand: KENDALL

## (undated) DEVICE — HEWSON SUTURE RETRIEVER: Brand: HEWSON SUTURE RETRIEVER

## (undated) DEVICE — SUTURE MCRYL SZ 3-0 L27IN ABSRB VLT L17MM RB-1 1/2 CIR Y305H

## (undated) DEVICE — BIPOLAR SEALER 23-112-1 AQM 6.0: Brand: AQUAMANTYS ®

## (undated) DEVICE — HANDPIECE SET WITH COAXIAL HIGH FLOW TIP AND SUCTION TUBE: Brand: INTERPULSE

## (undated) DEVICE — Z DISCONTINUED USE 2423037 APPLICATOR MEDICATED 3 CC CHLORHEXIDINE GLUC 2% CHLORAPREP

## (undated) DEVICE — SUT SLK 2-0SH 30IN BLK --

## (undated) DEVICE — AMD ANTIMICROBIAL GAUZE SPONGES,12 PLY USP TYPE VII, 0.2% POLYHEXAMETHYLENE BIGUANIDE HCI (PHMB): Brand: CURITY

## (undated) DEVICE — COVER,TABLE,44X76,STERILE: Brand: MEDLINE

## (undated) DEVICE — DRAPE,U/SHT,SPLIT,FILM,60X84,STERILE: Brand: MEDLINE

## (undated) DEVICE — FOOT DR TOLLISON & WOMACK: Brand: MEDLINE INDUSTRIES, INC.

## (undated) DEVICE — T4 HOOD

## (undated) DEVICE — SUTURE ETHLN SZ 2-0 L18IN NONABSORBABLE BLK L26MM PS 3/8 585H

## (undated) DEVICE — SUTURE SZ 0 27IN 5/8 CIR UR-6  TAPER PT VIOLET ABSRB VICRYL J603H

## (undated) DEVICE — SYRINGE MED 3ML CLR PLAS STD N CTRL LUERLOCK TIP DISP

## (undated) DEVICE — SCREW, BONE REMOVER: Brand: INBONE

## (undated) DEVICE — 3M™ STERI-DRAPE™ INSTRUMENT POUCH 1018: Brand: STERI-DRAPE™

## (undated) DEVICE — SUTURE STRATAFIX SPRL SZ 1 L5IN ABSRB VLT CT-1 L36MM 1/2 SXPD2B401

## (undated) DEVICE — BANDAGE COMPR W4INXL12FT E DISP ESMARCH EVEN

## (undated) DEVICE — 3000CC GUARDIAN II: Brand: GUARDIAN

## (undated) DEVICE — VISUALIZATION SYSTEM: Brand: CLEARIFY

## (undated) DEVICE — TROCAR: Brand: KII FIOS FIRST ENTRY

## (undated) DEVICE — SYRINGE, LUER SLIP, STERILE, 60ML: Brand: MEDLINE

## (undated) DEVICE — GLOVE SURG SZ 65 CRM LTX FREE POLYISOPRENE POLYMER BEAD ANTI

## (undated) DEVICE — SUTURE STRATAFIX SZ 3-0 L30CM NONABSORBABLE UD L19MM FS-2 SXMP2B408

## (undated) DEVICE — MCLASS OSCILLATING SAW BLADE 19 X 1.27 (0.050") X 90 MM: Brand: MCLASS

## (undated) DEVICE — SUTURE MCRYL SZ 2-0 L27IN ABSRB UD CP-1 1 L36MM 1/2 CIR REV Y266H

## (undated) DEVICE — SEAL

## (undated) DEVICE — LAP CHOLE: Brand: MEDLINE INDUSTRIES, INC.

## (undated) DEVICE — PERMANENT CAUTERY HOOK: Brand: ENDOWRIST

## (undated) DEVICE — SUTURE VCRL SZ 2-0 L27IN ABSRB UD L26MM CT-2 1/2 CIR J269H

## (undated) DEVICE — DRAPE,HIP,W/POUCHES,STERILE: Brand: MEDLINE

## (undated) DEVICE — STERILE PACKED. BORE DIAMETER 1.6 MM; ANGLE OF INSERTION 19° TO THE LONG AXIS OF THE TRANSDUCER: Brand: SINGLE-USE BIPLANE BIOPSY GUIDE

## (undated) DEVICE — GLOVE SURG SZ 65 L12IN FNGR THK79MIL GRN LTX FREE

## (undated) DEVICE — SUTURE VCRL SZ 2-0 L27IN ABSRB UD L36MM CP-1 1/2 CIR REV J266H

## (undated) DEVICE — FOAM BUMP ROUND LARGE: Brand: MEDLINE INDUSTRIES, INC.

## (undated) DEVICE — GOWN,PREVENTION PLUS,XLN/XL,ST,24/CS: Brand: MEDLINE

## (undated) DEVICE — SUTURE VCRL SZ 0 L36IN ABSRB UD L36MM CT-1 1/2 CIR J946H

## (undated) DEVICE — SHEARS HARM INSRT CRV 8MM -- DA VINCI XI - SNGL USE

## (undated) DEVICE — PAD PT POS 36 IN SURGYPAD DISP

## (undated) DEVICE — 3M™ IOBAN™ 2 ANTIMICROBIAL INCISE DRAPE 6651EZ: Brand: IOBAN™ 2

## (undated) DEVICE — CONTAINER FORMALIN PREFILLED 10% NBF 60ML

## (undated) DEVICE — DRAIN SURG 15FR 100% SIL RND END PERF

## (undated) DEVICE — GOWN,BREATHABLE SLV,AURORA,LG,STRL: Brand: MEDLINE

## (undated) DEVICE — BUTTON SWITCH PENCIL BLADE ELECTRODE, HOLSTER: Brand: EDGE

## (undated) DEVICE — STAPLER 45: Brand: ENDOWRIST

## (undated) DEVICE — SPLINT THMB W4XL30IN FBRGLS PD PRECUT LTWT DURABLE FAST SET

## (undated) DEVICE — IMPLANTABLE DEVICE
Type: IMPLANTABLE DEVICE | Site: ANKLE | Status: NON-FUNCTIONAL
Brand: INBONE

## (undated) DEVICE — PIN FIX TROCAR PT 1 END 9/64X9 IN 1 PT STYL SMOOTH PLN STRL
Type: IMPLANTABLE DEVICE | Status: NON-FUNCTIONAL
Removed: 2020-07-29

## (undated) DEVICE — BIPOLAR CAUTERY CORD

## (undated) DEVICE — SOLUTION IRRIG 1000ML STRL H2O USP PLAS POUR BTL

## (undated) DEVICE — GOWN,AURORA,FABRIC-REINFORCED,2XL: Brand: MEDLINE

## (undated) DEVICE — AIRSEAL 12 MM ACCESS PORT AND PALM GRIP OBTURATOR WITH BLADELESS OPTICAL TIP, 120 MM LENGTH: Brand: AIRSEAL

## (undated) DEVICE — CONNECTOR TBNG OD5-7MM O2 END DISP

## (undated) DEVICE — Device: Brand: PROPHECY INBONE

## (undated) DEVICE — TOTAL HIP DR WATSON: Brand: MEDLINE INDUSTRIES, INC.

## (undated) DEVICE — BLADE ASSEMB CLP HAIR FINE --

## (undated) DEVICE — SEAL UNIV 5-8MM DISP BX/10 -- DA VINCI XI - SNGL USE

## (undated) DEVICE — SUTURE STRATAFIX SYMMETRIC PDS + SZ 1 L18IN ABSRB VLT L48MM SXPP1A400

## (undated) DEVICE — SUTURE MCRYL SZ 3-0 L27IN ABSRB UD L19MM PS-2 3/8 CIR PRIM Y427H

## (undated) DEVICE — LUBE JELLY FOIL PACK 1.4 OZ: Brand: MEDLINE INDUSTRIES, INC.